# Patient Record
Sex: FEMALE | Race: WHITE | NOT HISPANIC OR LATINO | Employment: FULL TIME | URBAN - METROPOLITAN AREA
[De-identification: names, ages, dates, MRNs, and addresses within clinical notes are randomized per-mention and may not be internally consistent; named-entity substitution may affect disease eponyms.]

---

## 2017-01-26 ENCOUNTER — TELEPHONE (OUTPATIENT)
Dept: OTHER | Facility: CLINIC | Age: 47
End: 2017-01-26

## 2017-01-26 NOTE — TELEPHONE ENCOUNTER
1/26/2017    Call Regarding [employer]boarding [employer]    Attempt 1    Message on voicemail     Comments:         Outreach   Jerri Hassan

## 2017-02-01 NOTE — TELEPHONE ENCOUNTER
2/1/2017    Call Regarding Onboarding [employer]    Attempt 2    Message on voicemail     Comments:       Outreach   KV

## 2017-03-23 ENCOUNTER — OFFICE VISIT (OUTPATIENT)
Dept: DERMATOLOGY | Facility: CLINIC | Age: 47
End: 2017-03-23

## 2017-03-23 DIAGNOSIS — D22.9 MULTIPLE BENIGN NEVI: Primary | ICD-10-CM

## 2017-03-23 DIAGNOSIS — L70.0 ACNE VULGARIS: ICD-10-CM

## 2017-03-23 DIAGNOSIS — B07.9 VIRAL WARTS, UNSPECIFIED TYPE: ICD-10-CM

## 2017-03-23 DIAGNOSIS — D22.9 MULTIPLE ATYPICAL NEVI: ICD-10-CM

## 2017-03-23 DIAGNOSIS — Z85.828 HISTORY OF NONMELANOMA SKIN CANCER: ICD-10-CM

## 2017-03-23 ASSESSMENT — PAIN SCALES - GENERAL: PAINLEVEL: NO PAIN (0)

## 2017-03-23 NOTE — PROGRESS NOTES
"[facility] Dermatology Note      Dermatology Problem List:  1. History of NMSC:              - BCC, R superior forehead s/p MMS 10/2014              - BCC, R Jehovah's witness, s/p MMS in [place] in 2012  2. Multiple (>100) atypical nevi on trunk and extremities. Prior bxs with moderate dysplasia.  3. AKs, improved after field therapy with  5-FU 1-2x/week  4. Flat warts, dorsal PIPs. Cryo 3/14/2016.  5. Acne. Benzaclin.  6. Sebaceous hyperplasia. Tretinoin 0.025% cream.  7. 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 Resolved with [facility] 0.1% cream      Encounter Date: Mar 23, 2017    CC:  Chief Complaint   Patient presents with     Derm Problem     Kay is here today for a full skin check.  Has a few \"spots\" on her back she's concerned about.           History of Present Illness:  Ms. Kay Ceja is a 46 year old female with a history of NMSCx2 who presents as a follow-up for above. The patient was last seen 6 months ago when she had a full skin check and treated with 5-fu field therapy for multiple AK's over her forehead and upper lip in the background sun damage. She additionally had inflamed SK's at that time that were removed via cryotherapy.     . She has been using the 5-FU 1-2x per week over the previous sites where she had AK's, these areas initially developed a sunburn like reaction, but now she does not get the same reaction. She has also tried applying this to some SK-like lesions on her back, and states these have now fallen off with this therapy as well. She thinks the AK's over her upper lip have improved and has not noticed any new gritty lesions or other areas of concern. She has not noticed any recurrence of her BCC. She has warts over her hands, but states these do not bother her much after she pares them down and uses 5-FU on them. She denies any other new lesions, changing lesions, itchy painful or burning spots or any other new concerns today. She has " otherwise been in good health.     Past Medical History:   Patient Active Problem List   Diagnosis     Anxiety     Amenorrhea     Osteopenia     Senile sebaceous gland hyperplasia     Pain in joint involving ankle and foot     Atypical nevi     Basal cell carcinoma of right forehead s/p mms 10-13-14     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    MRI otherwise normal     Insomnia      Osteopenia     T score -1.9 hip and back 1/19/11     Vasomotor rhinitis     uses nasalcort     Past Surgical History:   Procedure Laterality Date     basal cell ca excision       MOHS MICROGRAPHIC PROCEDURE         Social History:  Social History   Substance Use Topics     Smoking status: Former Smoker     Packs/day: 0.20     Years: 5.00     Types: Cigarettes     Quit date: 9/16/1999     Smokeless tobacco: Never Used     Alcohol use No       Family History:  Family History   Problem Relation Age of Onset     C.A.D. Maternal Grandmother      C.A.D. Maternal Grandfather      Breast Cancer Mother      Bipolar Disorder Father      suicide at age 42     Depression Brother      Suicide     CANCER No family hx of      skin cancer     Melanoma No family hx of      Skin Cancer No family hx of        Medications:  Current Outpatient Prescriptions   Medication Sig Dispense Refill     clonazePAM (KLONOPIN) 0.5 MG tablet Take 1 tablet (0.5 mg) by mouth daily 30 tablet 0     Calcium Carbonate (CALCIUM 600) 1500 (600 CA) MG TABS Take 2 tablets by mouth daily 180 tablet 3     fluorouracil (EFUDEX) 5 % cream Apply topically twice a week To face and ears 40 g 3     tretinoin (RETIN-A) 0.025 % cream Apply a pea-size amount to entire face nightly at bedtime. 45 g 11     omega-3 acid ethyl esters (LOVAZA) 1 G capsule Take 3 capsules (3 g) by mouth daily 90 capsule 11     Multiple Vitamins-Minerals  (MULTIVITAMIN & MINERAL PO) Take  by mouth.       triamcinolone (KENALOG) 0.1 % cream Apply to areas of rash on the back and buttocks twice daily for one month (Patient not taking: Reported on 3/23/2017) 45 g 3     omega-3 acid ethyl esters (LOVAZA) 1 G capsule Take 3 capsules (3 g) by mouth daily (Patient not taking: Reported on 3/23/2017) 270 capsule 3     clindamycin-benzoyl peroxide (BENZACLIN WITH PUMP) gel Apply bid as needed (Patient not taking: Reported on 3/23/2017) 50 g 11     triamcinolone (NASACORT AQ) 55 MCG/ACT nasal inhaler Spray 2 sprays in nostril daily (Patient not taking: Reported on 3/23/2017) 1 Inhaler 6     Calcium Carbonate-Vit D-Min (CALCIUM 1200 PO) Take 1 tablet by mouth daily Reported on 3/23/2017       fish oil-omega-3 fatty acids (FISH OIL) 1000 MG capsule Take 1 g by mouth daily Reported on 3/23/2017       No Known Allergies      Review of Systems:  -Constitutional: The patient denies fatigue, fevers, chills, unintended weight loss, and night sweats.  -HEENT: Patient denies nonhealing oral sores.  -Skin: As above in HPI. No additional skin concerns.    Physical exam:  Vitals: There were no vitals taken for this visit.  GEN: This is a well developed, well-nourished female in no acute distress, in a pleasant mood.    SKIN: Full skin, which includes the head/face, both arms, chest, back, abdomen,both legs, genitalia and/or groin buttocks, digits and/or nails, was examined.  - Sites of prior BCC over the forehead and temple show well-healed scars with no evidence of recurrence.  -Multiple waxy tan stuck on appearing papules over back, abdomen and legs  -Over the trunk and extremities, there are >100 light to dark brown macules, most are ovoid w/ symmetric borders and even reticulated pigments networks.   -No other lesions of concern on areas examined.     Impression/Plan:  1. History of nonmelanoma skin cancer, no clincial evidence of recurrence    Sun precaution was advised including the  use of sun screens of SPF 30 or higher, sun protective clothing, and avoidance of tanning beds.    ABCDE's of melanoma discussed, self skin checks were advised, and given the number of moles, discussed utility of taking photos for self-skin checks.    2. Actinic keratosis s/p field therapy w/ 5-FU, no evidence of AK's today over face, shoulders, arms or other sun exposed areas, suggesting effective therapy.     Discussed with patient may discontinue 5-FU, and to not use this on SK's and other lesions.     3. Atypical nevi, >100, on trunk and extremities. No lesions concerning for malignancy today. Recommended recheck in 6 months, and self checks in between to evaluate for any changing lesions.    4. Acne and sebacious hyperplasia, well controlled   - OK to refill BenzaClin and tretinoin if needed, no refills needed today.     5. Verruca vulgaris, dorsal PIP and left proximal palm, Pt states they are stable, and not growing or bothersome today, as she has had these for a while and they have always recurred after cryo, discussed waiting until they become symptomatic to treat again and patient agreed with this plan.     Follow-up in 6 months, earlier for new or changing lesions.     Staff Involved:  Scribed by Rafael Swanson, MS3 for Dr. Francis.

## 2017-03-23 NOTE — LETTER
"3/23/2017     RE: Kay Ceja  [address]  [place] [place] [address]     Dear Colleague,    Thank you for referring your patient, Kay Ceja, to the [facility] DERMATOLOGY at [facility]. Please see a copy of my visit note below.    Kay Ceja was seen and evaluated by myself with the medical student recording the encounter acting as scribe.  I have reviewed the scribed note for completeness and accuracy and made appropriate corrections and amendments.  Sly HOWARD      [facility] Dermatology Note      Dermatology Problem List:  1. History of NMSC:              - BCC, R superior forehead s/p MMS 10/2014              - BCC, R Mandaeism, s/p MMS in [place] in 2012  2. Multiple (>100) atypical nevi on trunk and extremities. Prior bxs with moderate dysplasia.  3. AKs, improved after field therapy with  5-FU 1-2x/week  4. Flat warts, dorsal PIPs. Cryo 3/14/2016.  5. Acne. Benzaclin.  6. Sebaceous hyperplasia. Tretinoin 0.025% cream.  7. 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 Resolved with [facility] 0.1% cream      Encounter Date: Mar 23, 2017    CC:  Chief Complaint   Patient presents with     Derm Problem     Kay is here today for a full skin check.  Has a few \"spots\" on her back she's concerned about.           History of Present Illness:  Ms. Kay Ceja is a 46 year old female with a history of NMSCx2 who presents as a follow-up for above. The patient was last seen 6 months ago when she had a full skin check and treated with 5-fu field therapy for multiple AK's over her forehead and upper lip in the background sun damage. She additionally had inflamed SK's at that time that were removed via cryotherapy.     . She has been using the 5-FU 1-2x per week over the previous sites where she had AK's, these areas initially developed a sunburn like reaction, but now she does not get the same reaction. She has also tried applying this to some " SK-like lesions on her back, and states these have now fallen off with this therapy as well. She thinks the AK's over her upper lip have improved and has not noticed any new gritty lesions or other areas of concern. She has not noticed any recurrence of her BCC. She has warts over her hands, but states these do not bother her much after she pares them down and uses 5-FU on them. She denies any other new lesions, changing lesions, itchy painful or burning spots or any other new concerns today. She has otherwise been in good health.     Past Medical History:   Patient Active Problem List   Diagnosis     Anxiety     Amenorrhea     Osteopenia     Senile sebaceous gland hyperplasia     Pain in joint involving ankle and foot     Atypical nevi     Basal cell carcinoma of right forehead s/p mms 10-13-14     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    MRI otherwise normal     Insomnia      Osteopenia     T score -1.9 hip and back 1/19/11     Vasomotor rhinitis     uses nasalcort     Past Surgical History:   Procedure Laterality Date     basal cell ca excision       MOHS MICROGRAPHIC PROCEDURE         Social History:  Social History   Substance Use Topics     Smoking status: Former Smoker     Packs/day: 0.20     Years: 5.00     Types: Cigarettes     Quit date: 9/16/1999     Smokeless tobacco: Never Used     Alcohol use No       Family History:  Family History   Problem Relation Age of Onset     C.A.D. Maternal Grandmother      C.A.D. Maternal Grandfather      Breast Cancer Mother      Bipolar Disorder Father      suicide at age 42     Depression Brother      Suicide     CANCER No family hx of      skin cancer     Melanoma No family hx of      Skin Cancer No family hx of        Medications:  Current Outpatient Prescriptions   Medication Sig Dispense Refill     clonazePAM  (KLONOPIN) 0.5 MG tablet Take 1 tablet (0.5 mg) by mouth daily 30 tablet 0     Calcium Carbonate (CALCIUM 600) 1500 (600 CA) MG TABS Take 2 tablets by mouth daily 180 tablet 3     fluorouracil (EFUDEX) 5 % cream Apply topically twice a week To face and ears 40 g 3     tretinoin (RETIN-A) 0.025 % cream Apply a pea-size amount to entire face nightly at bedtime. 45 g 11     omega-3 acid ethyl esters (LOVAZA) 1 G capsule Take 3 capsules (3 g) by mouth daily 90 capsule 11     Multiple Vitamins-Minerals (MULTIVITAMIN & MINERAL PO) Take  by mouth.       triamcinolone (KENALOG) 0.1 % cream Apply to areas of rash on the back and buttocks twice daily for one month (Patient not taking: Reported on 3/23/2017) 45 g 3     omega-3 acid ethyl esters (LOVAZA) 1 G capsule Take 3 capsules (3 g) by mouth daily (Patient not taking: Reported on 3/23/2017) 270 capsule 3     clindamycin-benzoyl peroxide (BENZACLIN WITH PUMP) gel Apply bid as needed (Patient not taking: Reported on 3/23/2017) 50 g 11     triamcinolone (NASACORT AQ) 55 MCG/ACT nasal inhaler Spray 2 sprays in nostril daily (Patient not taking: Reported on 3/23/2017) 1 Inhaler 6     Calcium Carbonate-Vit D-Min (CALCIUM 1200 PO) Take 1 tablet by mouth daily Reported on 3/23/2017       fish oil-omega-3 fatty acids (FISH OIL) 1000 MG capsule Take 1 g by mouth daily Reported on 3/23/2017       No Known Allergies      Review of Systems:  -Constitutional: The patient denies fatigue, fevers, chills, unintended weight loss, and night sweats.  -HEENT: Patient denies nonhealing oral sores.  -Skin: As above in HPI. No additional skin concerns.    Physical exam:  Vitals: There were no vitals taken for this visit.  GEN: This is a well developed, well-nourished female in no acute distress, in a pleasant mood.    SKIN: Full skin, which includes the head/face, both arms, chest, back, abdomen,both legs, genitalia and/or groin buttocks, digits and/or nails, was examined.  - Sites of prior BCC  over the forehead and temple show well-healed scars with no evidence of recurrence.  -Multiple waxy tan stuck on appearing papules over back, abdomen and legs  -Over the trunk and extremities, there are >100 light to dark brown macules, most are ovoid w/ symmetric borders and even reticulated pigments networks.   -No other lesions of concern on areas examined.     Impression/Plan:  1. History of nonmelanoma skin cancer, no clincial evidence of recurrence    Sun precaution was advised including the use of sun screens of SPF 30 or higher, sun protective clothing, and avoidance of tanning beds.    ABCDE's of melanoma discussed, self skin checks were advised, and given the number of moles, discussed utility of taking photos for self-skin checks.    2. Actinic keratosis s/p field therapy w/ 5-FU, no evidence of AK's today over face, shoulders, arms or other sun exposed areas, suggesting effective therapy.     Discussed with patient may discontinue 5-FU, and to not use this on SK's and other lesions.     3. Atypical nevi, >100, on trunk and extremities. No lesions concerning for malignancy today. Recommended recheck in 6 months, and self checks in between to evaluate for any changing lesions.    4. Acne and sebacious hyperplasia, well controlled   - OK to refill BenzaClin and tretinoin if needed, no refills needed today.     5. Verruca vulgaris, dorsal PIP and left proximal palm, Pt states they are stable, and not growing or bothersome today, as she has had these for a while and they have always recurred after cryo, discussed waiting until they become symptomatic to treat again and patient agreed with this plan.     Follow-up in 6 months, earlier for new or changing lesions.     Staff Involved:  Scribed by Rafael Swanson, MS3 for Dr. Francis.      Again, thank you for allowing me to participate in the care of your patient.      Sincerely,    MALIKA Francis MD

## 2017-03-23 NOTE — MR AVS SNAPSHOT
After Visit Summary   3/23/2017    Kay Ceja    MRN: [id number]           Patient Information     Date Of Birth          1970        Visit Information        Provider Department      3/23/2017 4:00 PM MALIKA Franics MD [facility] Dermatology        Care Instructions    -Try taking a photo of your back and taking another in 3-4 months to monitor for changes to moles you can't see easily.  - Come back in 6 months, or sooner for any new concerns.    The ABCDEs of Melanoma    Skin cancer can develop anywhere on the skin. Ask someone for help when checking your skin, especially in hard to see places. If you notice a mole different from others, or that changes, enlarges, itches, or bleeds (even if it is small), you should see a dermatologist.                Follow-ups after your visit        Follow-up notes from your care team     Return in about 6 months (around 9/23/2017).      Who to contact     Please call your clinic at [phone] to:    Ask questions about your health    Make or cancel appointments    Discuss your medicines    Learn about your test results    Speak to your doctor   If you have compliments or concerns about an experience at your clinic, or if you wish to file a complaint, please contact [facility] Patient Relations at [phone] or email us at [email or web address]         Additional Information About Your Visit        MyChart Information     [employer] gives you secure access to your electronic health record. If you see a primary care provider, you can also send messages to your care team and make appointments. If you have questions, please call your primary care clinic.  If you do not have a primary care provider, please call [phone] and they will assist you.      [employer] is an electronic gateway that provides easy, online access to your medical records. With [employer], you can request a clinic appointment, read your test results, renew  a prescription or communicate with your care team.     To access your existing account, please contact your [facility] or call [phone] for assistance.        Care EveryWhere ID     This is your Care EveryWhere ID. This could be used by other organizations to access your [place] medical records  [id number]         Blood Pressure from Last 3 Encounters:   08/25/16 (!) 82/56   12/10/15 107/72   09/25/15 100/62    Weight from Last 3 Encounters:   08/25/16 47.5 kg (104 lb 12.8 oz)   12/10/15 46.7 kg (103 lb)   09/25/15 45.8 kg (101 lb)              Today, you had the following     No orders found for display       Primary Care Provider Office Phone # Fax #    Kay Buck [phone]       [facility] [address] [facility] [address]  [facility] [address]        Thank you!     Thank you for choosing [facility] DERMATOLOGY  for your care. Our goal is always to provide you with excellent care. Hearing back from our patients is one way we can continue to improve our services. Please take a few minutes to complete the written survey that you may receive in the mail after your visit with us. Thank you!             Your Updated Medication List - Protect others around you: Learn how to safely use, store and throw away your medicines at www.disposemymeds.org.          This list is accurate as of: 3/23/17  4:32 PM.  Always use your most recent med list.                   Brand Name Dispense Instructions for use    CALCIUM 1200 PO      Take 1 tablet by mouth daily Reported on 3/23/2017       calcium carbonate 1500 (600 CA) MG tablet    CALCIUM 600    180 tablet    Take 2 tablets by mouth daily       clindamycin-benzoyl peroxide gel    BENZACLIN WITH PUMP    50 g    Apply bid as needed       clonazePAM 0.5 MG tablet    klonoPIN    30 tablet    Take 1 tablet (0.5 mg) by mouth daily       fish oil-omega-3 fatty acids 1000 MG capsule      Take 1 g by mouth daily Reported on  3/23/2017       fluorouracil 5 % cream    EFUDEX    40 g    Apply topically twice a week To face and ears       MULTIVITAMIN & MINERAL PO      Take  by mouth.       * omega-3 acid ethyl esters 1 G capsule    Lovaza    90 capsule    Take 3 capsules (3 g) by mouth daily       * omega-3 acid ethyl esters 1 G capsule    Lovaza    270 capsule    Take 3 capsules (3 g) by mouth daily       tretinoin 0.025 % cream    RETIN-A    45 g    Apply a pea-size amount to entire face nightly at bedtime.       triamcinolone 0.1 % cream    KENALOG    45 g    Apply to areas of rash on the back and buttocks twice daily for one month       triamcinolone 55 MCG/ACT nasal inhaler    NASACORT AQ    1 Inhaler    Spray 2 sprays in nostril daily       * Notice:  This list has 2 medication(s) that are the same as other medications prescribed for you. Read the directions carefully, and ask your doctor or other care provider to review them with you.

## 2017-03-23 NOTE — NURSING NOTE
"Dermatology Rooming Note    Kay Ceja's goals for this visit include:   Chief Complaint   Patient presents with     Derm Problem     Kay is here today for a full skin check.  Has a few \"spots\" on her back she's concerned about.         Megan Hicks LPN    "

## 2017-03-23 NOTE — PATIENT INSTRUCTIONS
-Try taking a photo of your back and taking another in 3-4 months to monitor for changes to moles you can't see easily.  - Come back in 6 months, or sooner for any new concerns.    The ABCDEs of Melanoma    Skin cancer can develop anywhere on the skin. Ask someone for help when checking your skin, especially in hard to see places. If you notice a mole different from others, or that changes, enlarges, itches, or bleeds (even if it is small), you should see a dermatologist.

## 2017-03-29 NOTE — PROGRESS NOTES
Kay Ceja was seen and evaluated by myself with the medical student recording the encounter acting as scribe.  I have reviewed the scribed note for completeness and accuracy and made appropriate corrections and amendments.  Sly HOWARD

## 2017-07-03 DIAGNOSIS — L73.8 SENILE SEBACEOUS GLAND HYPERPLASIA: ICD-10-CM

## 2017-07-03 RX ORDER — TRETINOIN 0.25 MG/G
CREAM TOPICAL
Qty: 45 G | Refills: 11 | Status: SHIPPED | OUTPATIENT
Start: 2017-07-03 | End: 2019-09-05

## 2017-07-03 NOTE — TELEPHONE ENCOUNTER
Last seen 03/23/17  No future appt.       tretinoin (RETIN-A) 0.025 % cream      Impression/Plan:  1. History of nonmelanoma skin cancer, no clincial evidence of recurrence    Sun precaution was advised including the use of sun screens of SPF 30 or higher, sun protective clothing, and avoidance of tanning beds.    ABCDE's of melanoma discussed, self skin checks were advised, and given the number of moles, discussed utility of taking photos for self-skin checks.     2. Actinic keratosis s/p field therapy w/ 5-FU, no evidence of AK's today over face, shoulders, arms or other sun exposed areas, suggesting effective therapy.     Discussed with patient may discontinue 5-FU, and to not use this on SK's and other lesions.      3. Atypical nevi, >100, on trunk and extremities. No lesions concerning for malignancy today. Recommended recheck in 6 months, and self checks in between to evaluate for any changing lesions.     4. Acne and sebacious hyperplasia, well controlled                  - OK to refill BenzaClin and tretinoin if needed, no refills needed today.      5. Verruca vulgaris, dorsal PIP and left proximal palm, Pt states they are stable, and not growing or bothersome today, as she has had these for a while and they have always recurred after cryo, discussed waiting until they become symptomatic to treat again and patient agreed with this plan.      Follow-up in 6 months, earlier for new or changing lesions

## 2017-07-03 NOTE — TELEPHONE ENCOUNTER
Received refill request for tretinoin 0.025% cream as [facility]. Dr. Francis's notes reviewed. Refill appropriate, and accepted.    Virginia Cisneros MD  Medicine/Dermatology, PGY-4  [facility]

## 2017-10-20 ENCOUNTER — TELEPHONE (OUTPATIENT)
Dept: DERMATOLOGY | Facility: CLINIC | Age: 47
End: 2017-10-20

## 2017-10-20 NOTE — TELEPHONE ENCOUNTER
[facility] Prior Authorization Team   Phone: [phone]  Fax: [phone]      PA Initiation    Medication: tretinoin (RETIN-A) 0.025 % cream  Insurance Company: [employer] - Phone [phone] Fax [phone]  Pharmacy Filling the Rx: [id number] - [place], [place] - [address]  Filling Pharmacy Phone: [phone]  Filling Pharmacy Fax: [phone]  Start Date: 10/20/2017

## 2017-10-20 NOTE — TELEPHONE ENCOUNTER
Prior Authorization Approval    Authorization Effective Date: 10/18/2017  Authorization Expiration Date: 10/18/2018  Medication: tretinoin (RETIN-A) 0.025 % cream - approved  Approved Dose/Quantity:   Reference #: [id number]   Insurance Company: [employer] - Phone [phone] Fax [phone]  Expected CoPay: $20.00     CoPay Card Available:      Foundation Assistance Needed:    Which Pharmacy is filling the prescription (Not needed for infusion/clinic administered): [id number] [place], [place] - [address] [place] [address]  Pharmacy Notified: Yes  Patient Notified: Yes

## 2017-12-04 DIAGNOSIS — G47.00 INSOMNIA, UNSPECIFIED TYPE: Primary | ICD-10-CM

## 2017-12-04 NOTE — TELEPHONE ENCOUNTER
Pt called to request refill of klonopin. Last in clinic 8/2016 where this medication was ordered by Dr. Buck. Pt takes PRN for insomnia. Informed pt she is due in clinic for annual and would need to be seen for refills. Pt agreeable to schedule but is leaving country on Thursday for one month and won't be able to see Dr. Buck until she returns. Is requesting refill before she departs on her trip. Informed pt I will send to Dr. Buck for approval and get back to her tomorrow.

## 2017-12-05 RX ORDER — CLONAZEPAM 0.5 MG/1
0.5 TABLET ORAL
Qty: 30 TABLET | Refills: 0 | Status: SHIPPED | OUTPATIENT
Start: 2017-12-05 | End: 2017-12-05

## 2017-12-05 NOTE — TELEPHONE ENCOUNTER
Received approval to call in 30 day supply of Klonopin. Called pt and left message on voicemail requesting preferred pharmacy. Awaiting call back

## 2017-12-06 ENCOUNTER — TELEPHONE (OUTPATIENT)
Dept: OBGYN | Facility: CLINIC | Age: 47
End: 2017-12-06

## 2017-12-06 RX ORDER — CLONAZEPAM 0.5 MG/1
0.5 TABLET ORAL
Qty: 30 TABLET | Refills: 0 | Status: SHIPPED | OUTPATIENT
Start: 2017-12-06 | End: 2020-06-30

## 2017-12-06 NOTE — TELEPHONE ENCOUNTER
Pt called [facility]. Called into pharmacy and called to inform patient.

## 2018-01-18 ENCOUNTER — RADIANT APPOINTMENT (OUTPATIENT)
Dept: MAMMOGRAPHY | Facility: CLINIC | Age: 48
End: 2018-01-18
Attending: OBSTETRICS & GYNECOLOGY
Payer: COMMERCIAL

## 2018-01-18 ENCOUNTER — OFFICE VISIT (OUTPATIENT)
Dept: OBGYN | Facility: CLINIC | Age: 48
End: 2018-01-18
Attending: OBSTETRICS & GYNECOLOGY
Payer: COMMERCIAL

## 2018-01-18 VITALS
HEART RATE: 69 BPM | BODY MASS INDEX: 17.93 KG/M2 | DIASTOLIC BLOOD PRESSURE: 67 MMHG | SYSTOLIC BLOOD PRESSURE: 105 MMHG | HEIGHT: 64 IN | WEIGHT: 105 LBS

## 2018-01-18 DIAGNOSIS — Z12.39 BREAST CANCER SCREENING: ICD-10-CM

## 2018-01-18 DIAGNOSIS — Z13.820 SCREENING FOR OSTEOPOROSIS: ICD-10-CM

## 2018-01-18 DIAGNOSIS — Z01.419 ENCOUNTER FOR GYNECOLOGICAL EXAMINATION WITHOUT ABNORMAL FINDING: Primary | ICD-10-CM

## 2018-01-18 DIAGNOSIS — Z12.4 SCREENING FOR MALIGNANT NEOPLASM OF CERVIX: ICD-10-CM

## 2018-01-18 DIAGNOSIS — N91.1 AMENORRHEA, SECONDARY: ICD-10-CM

## 2018-01-18 PROCEDURE — 87624 HPV HI-RISK TYP POOLED RSLT: CPT | Performed by: OBSTETRICS & GYNECOLOGY

## 2018-01-18 PROCEDURE — 77067 SCR MAMMO BI INCL CAD: CPT

## 2018-01-18 PROCEDURE — G0463 HOSPITAL OUTPT CLINIC VISIT: HCPCS | Mod: ZF

## 2018-01-18 PROCEDURE — G0145 SCR C/V CYTO,THINLAYER,RESCR: HCPCS | Performed by: OBSTETRICS & GYNECOLOGY

## 2018-01-18 ASSESSMENT — PAIN SCALES - GENERAL: PAINLEVEL: NO PAIN (0)

## 2018-01-18 ASSESSMENT — ANXIETY QUESTIONNAIRES
2. NOT BEING ABLE TO STOP OR CONTROL WORRYING: NOT AT ALL
GAD7 TOTAL SCORE: 2
6. BECOMING EASILY ANNOYED OR IRRITABLE: NOT AT ALL
3. WORRYING TOO MUCH ABOUT DIFFERENT THINGS: NOT AT ALL
1. FEELING NERVOUS, ANXIOUS, OR ON EDGE: SEVERAL DAYS
7. FEELING AFRAID AS IF SOMETHING AWFUL MIGHT HAPPEN: NOT AT ALL
5. BEING SO RESTLESS THAT IT IS HARD TO SIT STILL: NOT AT ALL

## 2018-01-18 ASSESSMENT — PATIENT HEALTH QUESTIONNAIRE - PHQ9
SUM OF ALL RESPONSES TO PHQ QUESTIONS 1-9: 0
5. POOR APPETITE OR OVEREATING: SEVERAL DAYS

## 2018-01-18 NOTE — PROGRESS NOTES
Progress Note    SUBJECTIVE:  Kay Ceja is an 47 year old  who requests a breast and pelvic exam.        Concerns today include: Wonders about follow-up DEXA scan given early menopause (secondary amenorrhea).    Menstrual History:  Menstrual History 3/16/2012 8/25/2016 8/25/2016   LAST MENSTRUAL PERIOD 12/16/2004 5/24/2016 -   Menarche age - - 13   Period Cycle (Days) - - no period for 12 years then had one 5-   Period Duration (Days) - - 5   Period Pattern - - Irregular   Menstrual Flow - - Light   Dysmenorrhea - - Mild   PMS Symptoms - - Cramping   Reviewed Today - - Yes       Last    Lab Results   Component Value Date    PAP NIL 03/07/2013     History of abnormal Pap smear: NO - age 30-65 PAP every 5 years with negative HPV co-testing recommended    HPV negative 3/7/13    Mammogram current: has scheduled today    HISTORY:  Prescription Medications as of 1/18/2018             tretinoin (RETIN-A) 0.025 % cream Apply a pea-size amount to entire face nightly at bedtime.    omega-3 acid ethyl esters (LOVAZA) 1 G capsule Take 3 capsules (3 g) by mouth daily    Multiple Vitamins-Minerals (MULTIVITAMIN & MINERAL PO) Take  by mouth.    clonazePAM (KLONOPIN) 0.5 MG tablet Take 1 tablet (0.5 mg) by mouth nightly as needed for other (anxiety and sleep)    Calcium Carbonate-Vit D-Min (CALCIUM 1200 PO) Take 1 tablet by mouth daily Reported on 3/23/2017    fish oil-omega-3 fatty acids (FISH OIL) 1000 MG capsule Take 1 g by mouth daily Reported on 3/23/2017        No Known Allergies  Immunization History   Administered Date(s) Administered     Influenza (IIV3) PF 11/20/2012, 10/27/2013       Obstetric History     No data available     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    MRI otherwise normal     Insomnia      Osteopenia     T score -1.9 hip and back 1/19/11      "Vasomotor rhinitis     uses nasalcort     Past Surgical History:   Procedure Laterality Date     basal cell ca excision       MOHS MICROGRAPHIC PROCEDURE       Family History   Problem Relation Age of Onset     EVELIN Maternal Grandmother      EVELIN Maternal Grandfather      Breast Cancer Mother      Bipolar Disorder Father      suicide at age 42     Depression Brother      Suicide     CANCER No family hx of      skin cancer     Melanoma No family hx of      Skin Cancer No family hx of      Social History     Social History     Marital status:      Spouse name: N/A     Number of children: N/A     Years of education: N/A     Social History Main Topics     Smoking status: Former Smoker     Packs/day: 0.20     Years: 5.00     Types: Cigarettes     Quit date: 9/16/1999     Smokeless tobacco: Never Used     Alcohol use No     Drug use: No     Sexual activity: Not Currently     Partners: Male     Other Topics Concern     Exercise Yes     runner, yoga, strength trains     Social History Narrative    How much exercise per week? daily    How much calcium per day? supplements       How much caffeine per day? 2 tea    How much vitamin D per day? In supplements    Do you/your family wear seatbelts?  Yes    Do you/your family use safety helmets? Yes    Do you/your family use sunscreen? Yes    Do you/your family keep firearms in the home? No    Do you/your family have a smoke detector(s)? Yes        Do you feel safe in your home? Yes    Has anyone ever touched you in an unwanted manner? No     Explain reviewed [facility] 8-           ROS    EXAM:  Blood pressure 105/67, pulse 69, height 1.626 m (5' 4\"), weight 47.6 kg (105 lb), not currently breastfeeding. Body mass index is 18.02 kg/(m^2).  General appearance: Pleasant female in no acute distress.     BREAST EXAM:  Breast: Without visible skin changes. No dimpling or lesions seen.   Breasts supple, non-tender with palpation, no dominant mass, nodularity, or nipple " discharge noted bilaterally. Axillary nodes negative.      PELVIC EXAM:  EG/BUS: Normal genital architecture without lesions, erythema or abnormal secretions Bartholin's, Urethra, [place]'s normal   Urethral meatus: normal    Urethra: no masses, tenderness, or scarring    Bladder: no masses or tenderness    Vagina: moist, pink, rugae with creamy, white and odorless  secretions  Cervix: Nulliparous, and no lesions  Uterus: anteverted,  and small, smooth, firm, mobile w/o pain  Adnexa: Within normal limits and No masses, nodularity, tenderness  Rectum:anus normal       ASSESSMENT:  Encounter Diagnoses   Name Primary?     Encounter for gynecological examination without abnormal finding Yes     Screening for malignant neoplasm of cervix      Breast cancer screening      Amenorrhea, secondary      Screening for osteoporosis       47 year old Female Pelvic and Breast Exam    PLAN:   Orders Placed This Encounter   Procedures     Pelvic and Breast Exam Procedure []     Pap Smear Exam []     Mammogram Screening Bilateral Digital [VMM223]     Dexa hip/pelvis/spine     Pap imaged thin layer screen with HPV - recommended age 30 - 65 years (select HPV order below)     HPV High Risk Types DNA Cervical       Return in one year/PRN for preventive care or problems/concerns.     Verbalized understanding and agreement with visit plan.    Kay Buck MD

## 2018-01-18 NOTE — MR AVS SNAPSHOT
After Visit Summary   1/18/2018    Kay Ceja    MRN: [id number]           Patient Information     Date Of Birth          1970        Visit Information        Provider Department      1/18/2018 9:00 AM Kay Buck MD [facility]        Today's Diagnoses     Encounter for gynecological examination without abnormal finding    -  1    Screening for malignant neoplasm of cervix        Breast cancer screening        Amenorrhea, secondary        Screening for osteoporosis           Follow-ups after your visit        Follow-up notes from your care team     Return in 1 year (on 1/18/2019) for Preventative Health Visit.      Who to contact     Please call your clinic at [phone] to:    Ask questions about your health    Make or cancel appointments    Discuss your medicines    Learn about your test results    Speak to your doctor   If you have compliments or concerns about an experience at your clinic, or if you wish to file a complaint, please contact [facility] Patient Relations at [phone] or email us at [email or web address]         Additional Information About Your Visit        MyChart Information     [employer] gives you secure access to your electronic health record. If you see a primary care provider, you can also send messages to your care team and make appointments. If you have questions, please call your primary care clinic.  If you do not have a primary care provider, please call [phone] and they will assist you.      The Vetted Net is an electronic gateway that provides easy, online access to your medical records. With The Vetted Net, you can request a clinic appointment, read your test results, renew a prescription or communicate with your care team.     To access your existing account, please contact your [facility] or call [phone] for assistance.        Care EveryWhere ID     This is your Care  "EveryWhere ID. This could be used by other organizations to access your [facility] medical records  [id number]        Your Vitals Were     Pulse Height Breastfeeding? BMI (Body Mass Index)          69 1.626 m (5' 4\") No 18.02 kg/m2         Blood Pressure from Last 3 Encounters:   01/18/18 105/67   08/25/16 (!) 82/56   12/10/15 107/72    Weight from Last 3 Encounters:   01/18/18 47.6 kg (105 lb)   08/25/16 47.5 kg (104 lb 12.8 oz)   12/10/15 46.7 kg (103 lb)              We Performed the Following     HPV High Risk Types DNA Cervical     Pap imaged thin layer screen with HPV - recommended age 30 - 65 years (select HPV order below)     Pap Smear Exam []     Pelvic and Breast Exam Procedure []          Today's Medication Changes          These changes are accurate as of: 1/18/18 11:59 PM.  If you have any questions, ask your nurse or doctor.               These medicines have changed or have updated prescriptions.        Dose/Directions    omega-3 acid ethyl esters 1 G capsule   Commonly known as:  Lovaza   This may have changed:  Another medication with the same name was removed. Continue taking this medication, and follow the directions you see here.   Used for:  Back pain   Changed by:  Kay Buck MD        Dose:  3 g   Take 3 capsules (3 g) by mouth daily   Quantity:  90 capsule   Refills:  11         Stop taking these medicines if you haven't already. Please contact your care team if you have questions.     calcium carbonate 1500 (600 CA) MG tablet   Commonly known as:  CALCIUM 600   Stopped by:  Kay Buck MD           clindamycin-benzoyl peroxide gel   Commonly known as:  BENZACLIN WITH PUMP   Stopped by:  Kay Buck MD           fluorouracil 5 % cream   Commonly known as:  EFUDEX   Stopped by:  Kay Buck MD           triamcinolone 0.1 % cream   Commonly known as:  KENALOG   Stopped by:  Kay Buck MD           triamcinolone 55 MCG/ACT nasal inhaler   Commonly known " as:  NASACORT AQ   Stopped by:  Kay Buck MD                    Primary Care Provider Office Phone # Fax #    Kay Buck [phone]       [address] [address]        Equal Access to Services     [facility]: Hadii aad ku hadchristiano Soomaali, waaxda luqadaha, qaybta kaalmada adeegyada, waxalba roycein kavinn adeisacc joseph lajoelalfredo . So [facility] [phone].    ATENCIÓN: Si habla español, tiene a mahoney disposición servicios gratuitos de asistencia lingüística. Llame al [phone].    We comply with applicable federal civil rights laws and [place] laws. We do not discriminate on the basis of race, color, national origin, age, disability, sex, sexual orientation, or gender identity.            Thank you!     Thank you for choosing [facility]  for your care. Our goal is always to provide you with excellent care. Hearing back from our patients is one way we can continue to improve our services. Please take a few minutes to complete the written survey that you may receive in the mail after your visit with us. Thank you!             Your Updated Medication List - Protect others around you: Learn how to safely use, store and throw away your medicines at www.disposemymeds.org.          This list is accurate as of: 1/18/18 11:59 PM.  Always use your most recent med list.                   Brand Name Dispense Instructions for use Diagnosis    CALCIUM 1200 PO      Take 1 tablet by mouth daily Reported on 3/23/2017        clonazePAM 0.5 MG tablet    klonoPIN    30 tablet    Take 1 tablet (0.5 mg) by mouth nightly as needed for other (anxiety and sleep)    Insomnia, unspecified type       fish oil-omega-3 fatty acids 1000 MG capsule      Take 1 g by mouth daily Reported on 3/23/2017        MULTIVITAMIN & MINERAL PO      Take  by mouth.        omega-3 acid ethyl esters 1 G capsule    Lovaza    90 capsule    Take 3 capsules (3 g) by mouth daily    Back pain        tretinoin 0.025 % cream    RETIN-A    45 g    Apply a pea-size amount to entire face nightly at bedtime.    Senile sebaceous gland hyperplasia

## 2018-01-18 NOTE — LETTER
1/18/2018       RE: Kay Ceja  [address]  [facility] [address]     Dear Colleague,    Thank you for referring your patient, Kay Ceja, to the [facility] at [facility]. Please see a copy of my visit note below.      Progress Note    SUBJECTIVE:  Kay Ceja is an 47 year old  who requests a breast and pelvic exam.        Concerns today include: Wonders about follow-up DEXA scan given early menopause (secondary amenorrhea).    Menstrual History:  Menstrual History 3/16/2012 8/25/2016 8/25/2016   LAST MENSTRUAL PERIOD 12/16/2004 5/24/2016 -   Menarche age - - 13   Period Cycle (Days) - - no period for 12 years then had one 5-   Period Duration (Days) - - 5   Period Pattern - - Irregular   Menstrual Flow - - Light   Dysmenorrhea - - Mild   PMS Symptoms - - Cramping   Reviewed Today - - Yes       Last    Lab Results   Component Value Date    PAP NIL 03/07/2013     History of abnormal Pap smear: NO - age 30-65 PAP every 5 years with negative HPV co-testing recommended    HPV negative 3/7/13    Mammogram current: has scheduled today    HISTORY:  Prescription Medications as of 1/18/2018             tretinoin (RETIN-A) 0.025 % cream Apply a pea-size amount to entire face nightly at bedtime.    omega-3 acid ethyl esters (LOVAZA) 1 G capsule Take 3 capsules (3 g) by mouth daily    Multiple Vitamins-Minerals (MULTIVITAMIN & MINERAL PO) Take  by mouth.    clonazePAM (KLONOPIN) 0.5 MG tablet Take 1 tablet (0.5 mg) by mouth nightly as needed for other (anxiety and sleep)    Calcium Carbonate-Vit D-Min (CALCIUM 1200 PO) Take 1 tablet by mouth daily Reported on 3/23/2017    fish oil-omega-3 fatty acids (FISH OIL) 1000 MG capsule Take 1 g by mouth daily Reported on 3/23/2017        No Known Allergies  Immunization History   Administered Date(s) Administered     Influenza (IIV3) PF 11/20/2012, 10/27/2013       Obstetric History     No data available     Past  "Medical History:   Diagnosis Date     Amenorrhea     hypothalamic     Anxiety      Back pain     normal MRI 9/4/09     Basal cell carcinoma      Chondromalacia of right patella 2/12/08    MRI otherwise normal     Insomnia      Osteopenia     T score -1.9 hip and back 1/19/11     Vasomotor rhinitis     uses nasalcort     Past Surgical History:   Procedure Laterality Date     basal cell ca excision       MOHS MICROGRAPHIC PROCEDURE       Family History   Problem Relation Age of Onset     C.A.DChirag Maternal Grandmother      CChiragAANANTH Maternal Grandfather      Breast Cancer Mother      Bipolar Disorder Father      suicide at age 42     Depression Brother      Suicide     CANCER No family hx of      skin cancer     Melanoma No family hx of      Skin Cancer No family hx of      Social History     Social History     Marital status:      Spouse name: N/A     Number of children: N/A     Years of education: N/A     Social History Main Topics     Smoking status: Former Smoker     Packs/day: 0.20     Years: 5.00     Types: Cigarettes     Quit date: 9/16/1999     Smokeless tobacco: Never Used     Alcohol use No     Drug use: No     Sexual activity: Not Currently     Partners: Male     Other Topics Concern     Exercise Yes     runner, yoga, strength trains     Social History Narrative    How much exercise per week? daily    How much calcium per day? supplements       How much caffeine per day? 2 tea    How much vitamin D per day? In supplements    Do you/your family wear seatbelts?  Yes    Do you/your family use safety helmets? Yes    Do you/your family use sunscreen? Yes    Do you/your family keep firearms in the home? No    Do you/your family have a smoke detector(s)? Yes        Do you feel safe in your home? Yes    Has anyone ever touched you in an unwanted manner? No     Explain reviewed [facility] 8-           ROS    EXAM:  Blood pressure 105/67, pulse 69, height 1.626 m (5' 4\"), weight 47.6 kg (105 lb), not currently " breastfeeding. Body mass index is 18.02 kg/(m^2).  General appearance: Pleasant female in no acute distress.     BREAST EXAM:  Breast: Without visible skin changes. No dimpling or lesions seen.   Breasts supple, non-tender with palpation, no dominant mass, nodularity, or nipple discharge noted bilaterally. Axillary nodes negative.      PELVIC EXAM:  EG/BUS: Normal genital architecture without lesions, erythema or abnormal secretions Bartholin's, Urethra, [place]'s normal   Urethral meatus: normal    Urethra: no masses, tenderness, or scarring    Bladder: no masses or tenderness    Vagina: moist, pink, rugae with creamy, white and odorless  secretions  Cervix: Nulliparous, and no lesions  Uterus: anteverted,  and small, smooth, firm, mobile w/o pain  Adnexa: Within normal limits and No masses, nodularity, tenderness  Rectum:anus normal       ASSESSMENT:  Encounter Diagnoses   Name Primary?     Encounter for gynecological examination without abnormal finding Yes     Screening for malignant neoplasm of cervix      Breast cancer screening      Amenorrhea, secondary      Screening for osteoporosis       47 year old Female Pelvic and Breast Exam    PLAN:   Orders Placed This Encounter   Procedures     Pelvic and Breast Exam Procedure []     Pap Smear Exam []     Mammogram Screening Bilateral Digital [PGF511]     Dexa hip/pelvis/spine     Pap imaged thin layer screen with HPV - recommended age 30 - 65 years (select HPV order below)     HPV High Risk Types DNA Cervical       Return in one year/PRN for preventive care or problems/concerns.     Verbalized understanding and agreement with visit plan.      Again, thank you for allowing me to participate in the care of your patient.      Sincerely,    Kay Buck MD

## 2018-01-19 ASSESSMENT — ANXIETY QUESTIONNAIRES: GAD7 TOTAL SCORE: 2

## 2018-01-22 LAB
COPATH REPORT: NORMAL
PAP: NORMAL

## 2018-01-24 LAB
FINAL DIAGNOSIS: NORMAL
HPV HR 12 DNA CVX QL NAA+PROBE: NEGATIVE
HPV16 DNA SPEC QL NAA+PROBE: NEGATIVE
HPV18 DNA SPEC QL NAA+PROBE: NEGATIVE
SPECIMEN DESCRIPTION: NORMAL
SPECIMEN SOURCE CVX/VAG CYTO: NORMAL

## 2018-07-12 ENCOUNTER — OFFICE VISIT (OUTPATIENT)
Dept: OBGYN | Facility: CLINIC | Age: 48
End: 2018-07-12
Attending: OBSTETRICS & GYNECOLOGY
Payer: COMMERCIAL

## 2018-07-12 VITALS
WEIGHT: 103.3 LBS | SYSTOLIC BLOOD PRESSURE: 94 MMHG | DIASTOLIC BLOOD PRESSURE: 61 MMHG | HEIGHT: 64 IN | BODY MASS INDEX: 17.64 KG/M2

## 2018-07-12 DIAGNOSIS — B96.89 BACTERIAL VAGINOSIS: Primary | ICD-10-CM

## 2018-07-12 DIAGNOSIS — N76.0 BACTERIAL VAGINOSIS: Primary | ICD-10-CM

## 2018-07-12 PROCEDURE — G0463 HOSPITAL OUTPT CLINIC VISIT: HCPCS | Mod: ZF

## 2018-07-12 RX ORDER — METRONIDAZOLE 500 MG/1
500 TABLET ORAL 2 TIMES DAILY
Qty: 14 TABLET | Refills: 0 | Status: SHIPPED | OUTPATIENT
Start: 2018-07-12 | End: 2018-07-12

## 2018-07-12 RX ORDER — METRONIDAZOLE 7.5 MG/G
1 GEL VAGINAL AT BEDTIME
Qty: 70 G | Refills: 0 | Status: SHIPPED | OUTPATIENT
Start: 2018-07-12 | End: 2018-07-17

## 2018-07-12 ASSESSMENT — PAIN SCALES - GENERAL: PAINLEVEL: NO PAIN (0)

## 2018-07-12 NOTE — MR AVS SNAPSHOT
After Visit Summary   7/12/2018    Kay Ceja    MRN: [id number]           Patient Information     Date Of Birth          1970        Visit Information        Provider Department      7/12/2018 3:00 PM Bev Jones APRN CNP [facility]        Today's Diagnoses     Visit for preventive health examination    -  1    Bacterial vaginosis          Care Instructions      PREVENTIVE HEALTH RECOMMENDATIONS:   Most women need a yearly breast and pelvic exam.    A PAP screen, a test done DURING a pelvic exam, is NO longer recommended yearly.    March 2013, screening guidelines recommended by ACOG for PAP screen are:    1) First pap at age 21.    2) Pap every 3 years until age 30.    3) After age 30, pap every 3 years or Pap with HR HPV screen every 5 years until age 65.  4) Women do NOT need a vaginal Pap screen after a hysterectomy (surgical removal of the uterus) when they have not had cancer.    Exceptions:  1) Yearly pap if HIV+ or immunosuppressed secondary to organ transplant  2) BETSY II-III continue routine screening for 20 years.    I encourage you continue looking for opportunities to choose a healthy lifestyle:       * Choose to eat a heart healthy diet. Check out the FOOD PLATE guidelines at: http://www.choosemyplate.gov/ for helpful hints on weight and cholesterol management.  Balance your caloric intake with exercise to maintain a BMI in the 22 to 26 range. For bone health: Eat calcium-rich foods like yogurt, broccoli or take chewable calcium pills (500 to 600 mg) twice a day with food.       * Exercise for at least an average of 30 minutes a day, 5 days of the week. This will help you control your weight, release stress, and help prevent disease.      * Take a Vitamin D3 supplement daily fall through spring and during summer unless you ptll01-41' full body sun exposure to skin without sunscreen.      * DO wear sunscreen to prevent skin cancer after the first 15-30  minutes.      * Identify stressors in your life, find ways to release the stress, and, make time for yourself. PLEASE ask for help if mood changes last longer than two weeks.     * Limit alcohol to one drink per day.  No smoking.  Avoid second hand smoke. If you smoke, ask for help to stop.       *  If you are in a sexual relationship, talk with your partner about possible infection risks and take action to protect yourself from exposure to a sexual infection.    Please request an infection screen for STIs (sexually transmitted infections) if you are less than age 26 OR believe that you may be at risk.     Get a flu shot each year. Get a tetanus shot every 10 years. EVERYONE needs a pertussis (Whooping cough) booster.    See your dentist twice a year for an exam and preventive care cleaning.     Consider the following screen tests:    1) cholesterol test every 5 years.     2) yearly mammogram after age 40 unless you have identified risks.    3) colonoscopy every 10 years after age 50 unless you have identified risks.    4) diabetes blood test screening if you are at risk for diabetes.      Additional information that you may also find helpful:  The Internet now gives us access to LOTS of information -- some of it helpful, research documented and also plenty of harmful, anecdotal information that may not pertain to your situtaion. Consider visiting the following websites for accurate health information:    www.vitamindcouncil.org/ : Info and ongoing research re Vitamin D    www.fairview.org : Up to date and easily searchable information on multiple topics.    www.medlineplus.gov : medication info, interactive tutorials, watch real surgeries online    www.cdc.gov : public health info, travel advisories, epidemics (H1N1)    www.miguel/std.org: current research re diagnosis, treatment and prevention of sexually contacted infections.    www.health.[facility].[place] : [place] dept of heatlh, public health issues in [place],  "N1N1    www.familydoctor.org : good info from the Academy of Family Physicians                Follow-ups after your visit        Follow-up notes from your care team     Return in 1 year (on 7/12/2019) for Preventative Health Visit.      Who to contact     Please call your clinic at [phone] to:    Ask questions about your health    Make or cancel appointments    Discuss your medicines    Learn about your test results    Speak to your doctor            Additional Information About Your Visit        Care-n-ShareharPrompt.ly Information     [employer] gives you secure access to your electronic health record. If you see a primary care provider, you can also send messages to your care team and make appointments. If you have questions, please call your primary care clinic.  If you do not have a primary care provider, please call [phone] and they will assist you.      [employer] is an electronic gateway that provides easy, online access to your medical records. With [employer], you can request a clinic appointment, read your test results, renew a prescription or communicate with your care team.     To access your existing account, please contact your [facility] or call [phone] for assistance.        Care EveryWhere ID     This is your Care EveryWhere ID. This could be used by other organizations to access your [facility] medical records  [id number]        Your Vitals Were     Height BMI (Body Mass Index)                1.626 m (5' 4\") 17.73 kg/m2           Blood Pressure from Last 3 Encounters:   07/12/18 94/61   01/18/18 105/67   08/25/16 (!) 82/56    Weight from Last 3 Encounters:   07/12/18 46.9 kg (103 lb 4.8 oz)   01/18/18 47.6 kg (105 lb)   08/25/16 47.5 kg (104 lb 12.8 oz)              Today, you had the following     No orders found for display         Today's Medication Changes          These changes are accurate as of 7/12/18  3:37 PM.  If you have any questions, ask your nurse or doctor.       "         Start taking these medicines.        Dose/Directions    metroNIDAZOLE 500 MG tablet   Commonly known as:  FLAGYL   Used for:  Bacterial vaginosis   Started by:  Bev Jones APRN CNP        Dose:  500 mg   Take 1 tablet (500 mg) by mouth 2 times daily   Quantity:  14 tablet   Refills:  0            Where to get your medicines      These medications were sent to [facility] [id number] [facility] [place] [facility] [address]  [address] [facility] [address]     Phone:  [phone]     metroNIDAZOLE 500 MG tablet                Primary Care Provider Office Phone # Fax #    Kay Buck [phone]       [address]  [facility] [address]        Equal Access to Services     [facility] Center[facility] : Hadii ayala Zuniga, wadeon pascual, qalinda kaalmada elsa, liz duque . So [facility] [phone].    ATENCIÓN: Si habla español, tiene a mahoney disposición servicios gratuitos de asistencia lingüística. Llame al [phone].    We comply with applicable federal civil rights laws and [place] laws. We do not discriminate on the basis of race, color, national origin, age, disability, sex, sexual orientation, or gender identity.            Thank you!     Thank you for choosing [facility]  for your care. Our goal is always to provide you with excellent care. Hearing back from our patients is one way we can continue to improve our services. Please take a few minutes to complete the written survey that you may receive in the mail after your visit with us. Thank you!             Your Updated Medication List - Protect others around you: Learn how to safely use, store and throw away your medicines at www.disposemymeds.org.          This list is accurate as of 7/12/18  3:37 PM.  Always use your most recent med list.                   Brand Name Dispense Instructions for use Diagnosis    CALCIUM 1200 PO      Take 1 tablet by mouth daily  Reported on 3/23/2017        clonazePAM 0.5 MG tablet    klonoPIN    30 tablet    Take 1 tablet (0.5 mg) by mouth nightly as needed for other (anxiety and sleep)    Insomnia, unspecified type       fish oil-omega-3 fatty acids 1000 MG capsule      Take 1 g by mouth daily Reported on 3/23/2017        metroNIDAZOLE 500 MG tablet    FLAGYL    14 tablet    Take 1 tablet (500 mg) by mouth 2 times daily    Bacterial vaginosis       MULTIVITAMIN & MINERAL PO      Take  by mouth.        omega-3 acid ethyl esters 1 g capsule    Lovaza    90 capsule    Take 3 capsules (3 g) by mouth daily    Back pain       tretinoin 0.025 % cream    RETIN-A    45 g    Apply a pea-size amount to entire face nightly at bedtime.    Senile sebaceous gland hyperplasia

## 2018-07-12 NOTE — LETTER
7/12/2018       RE: Kay Ceja  [address]  [facility] [address]     Dear Colleague,    Thank you for referring your patient, Kay Ceja, to the [facility] at [facility]. Please see a copy of my visit note below.          Progress Note    SUBJECTIVE:  REBECA Ceja is an 47 year old, No obstetric history on file., who requests an evaluation of vaginal discharge and odor for one week.    States she is not sexually active. Noticed symptoms after a sitting meditation retreat  Menses stopped in 30s  Lat well woman annual 1/2018 with Dr Buck      Menstrual History:  Menstrual History 3/16/2012 8/25/2016 8/25/2016   LAST MENSTRUAL PERIOD 12/16/2004 5/24/2016 -   Menarche age - - 13   Period Cycle (Days) - - no period for 12 years then had one 5-   Period Duration (Days) - - 5   Period Pattern - - Irregular   Menstrual Flow - - Light   Dysmenorrhea - - Mild   PMS Symptoms - - Cramping   Reviewed Today - - Yes       Last    Lab Results   Component Value Date    PAP NIL 01/18/2018         Last   Lab Results   Component Value Date    HPV16 Negative 01/18/2018     Last   Lab Results   Component Value Date    HPV18 Negative 01/18/2018     Last   Lab Results   Component Value Date    HRHPV Negative 01/18/2018         HISTORY:    Current Outpatient Prescriptions on File Prior to Visit:  Calcium Carbonate-Vit D-Min (CALCIUM 1200 PO) Take 1 tablet by mouth daily Reported on 3/23/2017   clonazePAM (KLONOPIN) 0.5 MG tablet Take 1 tablet (0.5 mg) by mouth nightly as needed for other (anxiety and sleep)   fish oil-omega-3 fatty acids (FISH OIL) 1000 MG capsule Take 1 g by mouth daily Reported on 3/23/2017   Multiple Vitamins-Minerals (MULTIVITAMIN & MINERAL PO) Take  by mouth.   omega-3 acid ethyl esters (LOVAZA) 1 G capsule Take 3 capsules (3 g) by mouth daily   tretinoin (RETIN-A) 0.025 % cream Apply a pea-size amount to entire face nightly at bedtime.     No  current facility-administered medications on file prior to visit.   No Known Allergies  Immunization History   Administered Date(s) Administered     Influenza (IIV3) PF 11/20/2012, 10/27/2013       Obstetric History     No data available     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    MRI otherwise normal     Insomnia      Osteopenia     T score -1.9 hip and back 1/19/11     Vasomotor rhinitis     uses nasalcort     Past Surgical History:   Procedure Laterality Date     basal cell ca excision       MOHS MICROGRAPHIC PROCEDURE       Family History   Problem Relation Age of Onset     C.A.D. Maternal Grandmother      CChiragA.FRANKLYN Maternal Grandfather      Breast Cancer Mother      Bipolar Disorder Father      suicide at age 42     Depression Brother      Suicide     Cancer No family hx of      skin cancer     Melanoma No family hx of      Skin Cancer No family hx of      Social History     Social History     Marital status:      Spouse name: N/A     Number of children: N/A     Years of education: N/A     Social History Main Topics     Smoking status: Former Smoker     Packs/day: 0.20     Years: 5.00     Types: Cigarettes     Quit date: 9/16/1999     Smokeless tobacco: Never Used     Alcohol use No     Drug use: No     Sexual activity: Not Currently     Partners: Male     Other Topics Concern     Exercise Yes     runner, yoga, strength trains     Social History Narrative    How much exercise per week? daily    How much calcium per day? supplements       How much caffeine per day? 2 tea    How much vitamin D per day? In supplements    Do you/your family wear seatbelts?  Yes    Do you/your family use safety helmets? Yes    Do you/your family use sunscreen? Yes    Do you/your family keep firearms in the home? No    Do you/your family have a smoke detector(s)? Yes        Do  "you feel safe in your home? Yes    Has anyone ever touched you in an unwanted manner? No     Explain reviewed [facility] 8-           ROS  [unfilled]  PHQ-9 SCORE 4/8/2014 1/18/2018   Total Score 0 -   Total Score - 0     SANTA-7 SCORE 8/24/2016 1/18/2018   Total Score 14 (moderate anxiety) -   Total Score - 2         EXAM:  Blood pressure 94/61, height 1.626 m (5' 4\"), weight 46.9 kg (103 lb 4.8 oz), not currently breastfeeding. Body mass index is 17.73 kg/(m^2).  General - pleasant female in no acute distress.  Skin - no suspicious lesions or rashes  EENT-  PERRLA, euthyroid with out palpable nodules  Neck - supple without lymphadenopathy.  Musculoskeletal - no gross deformities.  Neurological - normal strength, sensation, and mental status.    Pelvic Exam:  EG/BUS: Normal genital architecture without lesions, erythema or abnormal secretions. Normal genital architecture without lesions, erythema or abnormal secretions Bartholin's, Urethra, [place]'s normal   Urethral meatus: normal   Urethra: no masses, tenderness, or scarring   Bladder: no masses or tenderness   Vagina: moist, pink, rugae with white, foul smelling and milky  secretions  Uterus: anteverted,   Adnexa: Within normal limits and No masses, nodularity, tenderness  Rectum:anus normal   wet prep: positive whiff, positive clue PH 5,no yeast      ASSESSMENT:  Encounter Diagnosis   Name Primary?     Bacterial vaginosis Yes        PLAN:     Metrogel vaginal cream as prescribed  Reviewed healthy vulvar hygiene, avoidance of dyes, scents,tight clothing    Bev LA            "

## 2018-07-12 NOTE — PROGRESS NOTES
Progress Note    SUBJECTIVE:  REBECA Ceja is an 47 year old, No obstetric history on file., who requests an evaluation of vaginal discharge and odor for one week.    States she is not sexually active. Noticed symptoms after a sitting meditation retreat  Menses stopped in 30s  Lat well woman annual 1/2018 with Dr Buck      Menstrual History:  Menstrual History 3/16/2012 8/25/2016 8/25/2016   LAST MENSTRUAL PERIOD 12/16/2004 5/24/2016 -   Menarche age - - 13   Period Cycle (Days) - - no period for 12 years then had one 5-   Period Duration (Days) - - 5   Period Pattern - - Irregular   Menstrual Flow - - Light   Dysmenorrhea - - Mild   PMS Symptoms - - Cramping   Reviewed Today - - Yes       Last    Lab Results   Component Value Date    PAP NIL 01/18/2018         Last   Lab Results   Component Value Date    HPV16 Negative 01/18/2018     Last   Lab Results   Component Value Date    HPV18 Negative 01/18/2018     Last   Lab Results   Component Value Date    HRHPV Negative 01/18/2018         HISTORY:    Current Outpatient Prescriptions on File Prior to Visit:  Calcium Carbonate-Vit D-Min (CALCIUM 1200 PO) Take 1 tablet by mouth daily Reported on 3/23/2017   clonazePAM (KLONOPIN) 0.5 MG tablet Take 1 tablet (0.5 mg) by mouth nightly as needed for other (anxiety and sleep)   fish oil-omega-3 fatty acids (FISH OIL) 1000 MG capsule Take 1 g by mouth daily Reported on 3/23/2017   Multiple Vitamins-Minerals (MULTIVITAMIN & MINERAL PO) Take  by mouth.   omega-3 acid ethyl esters (LOVAZA) 1 G capsule Take 3 capsules (3 g) by mouth daily   tretinoin (RETIN-A) 0.025 % cream Apply a pea-size amount to entire face nightly at bedtime.     No current facility-administered medications on file prior to visit.   No Known Allergies  Immunization History   Administered Date(s) Administered     Influenza (IIV3) PF 11/20/2012, 10/27/2013       Obstetric History     No data available     Past Medical History:   Diagnosis Date      Amenorrhea     hypothalamic     Anxiety      Back pain     normal MRI 9/4/09     Basal cell carcinoma      Chondromalacia of right patella 2/12/08    MRI otherwise normal     Insomnia      Osteopenia     T score -1.9 hip and back 1/19/11     Vasomotor rhinitis     uses nasalcort     Past Surgical History:   Procedure Laterality Date     basal cell ca excision       MOHS MICROGRAPHIC PROCEDURE       Family History   Problem Relation Age of Onset     C.A.DChirag Maternal Grandmother      CChiragA.DIEGO. Maternal Grandfather      Breast Cancer Mother      Bipolar Disorder Father      suicide at age 42     Depression Brother      Suicide     Cancer No family hx of      skin cancer     Melanoma No family hx of      Skin Cancer No family hx of      Social History     Social History     Marital status:      Spouse name: N/A     Number of children: N/A     Years of education: N/A     Social History Main Topics     Smoking status: Former Smoker     Packs/day: 0.20     Years: 5.00     Types: Cigarettes     Quit date: 9/16/1999     Smokeless tobacco: Never Used     Alcohol use No     Drug use: No     Sexual activity: Not Currently     Partners: Male     Other Topics Concern     Exercise Yes     runner, yoga, strength trains     Social History Narrative    How much exercise per week? daily    How much calcium per day? supplements       How much caffeine per day? 2 tea    How much vitamin D per day? In supplements    Do you/your family wear seatbelts?  Yes    Do you/your family use safety helmets? Yes    Do you/your family use sunscreen? Yes    Do you/your family keep firearms in the home? No    Do you/your family have a smoke detector(s)? Yes        Do you feel safe in your home? Yes    Has anyone ever touched you in an unwanted manner? No     Explain reviewed [facility] 8-           ROS  [unfilled]  PHQ-9 SCORE 4/8/2014 1/18/2018   Total Score 0 -   Total Score - 0     SANTA-7 SCORE 8/24/2016 1/18/2018   Total Score 14 (moderate anxiety)  "-   Total Score - 2         EXAM:  Blood pressure 94/61, height 1.626 m (5' 4\"), weight 46.9 kg (103 lb 4.8 oz), not currently breastfeeding. Body mass index is 17.73 kg/(m^2).  General - pleasant female in no acute distress.  Skin - no suspicious lesions or rashes  EENT-  PERRLA, euthyroid with out palpable nodules  Neck - supple without lymphadenopathy.  Musculoskeletal - no gross deformities.  Neurological - normal strength, sensation, and mental status.    Pelvic Exam:  EG/BUS: Normal genital architecture without lesions, erythema or abnormal secretions. Normal genital architecture without lesions, erythema or abnormal secretions Bartholin's, Urethra, [place]'s normal   Urethral meatus: normal   Urethra: no masses, tenderness, or scarring   Bladder: no masses or tenderness   Vagina: moist, pink, rugae with white, foul smelling and milky  secretions  Uterus: anteverted,   Adnexa: Within normal limits and No masses, nodularity, tenderness  Rectum:anus normal   wet prep: positive whiff, positive clue PH 5,no yeast      ASSESSMENT:  Encounter Diagnosis   Name Primary?     Bacterial vaginosis Yes        PLAN:     Metrogel vaginal cream as prescribed  Reviewed healthy vulvar hygiene, avoidance of dyes, scents,tight clothing    Bev LA        "

## 2018-07-12 NOTE — NURSING NOTE
Chief Complaint   Patient presents with     Vaginal Problem     Pt c/o vaginal itching, increase in discharge and noticed an odor.

## 2018-07-12 NOTE — PATIENT INSTRUCTIONS

## 2018-08-20 ENCOUNTER — TELEPHONE (OUTPATIENT)
Dept: OBGYN | Facility: CLINIC | Age: 48
End: 2018-08-20

## 2018-08-20 DIAGNOSIS — R30.0 DYSURIA: Primary | ICD-10-CM

## 2018-08-20 RX ORDER — SULFAMETHOXAZOLE/TRIMETHOPRIM 800-160 MG
1 TABLET ORAL 2 TIMES DAILY
Qty: 6 TABLET | Refills: 0 | Status: SHIPPED | OUTPATIENT
Start: 2018-08-20 | End: 2018-08-24

## 2018-08-20 NOTE — TELEPHONE ENCOUNTER
Spoke with Kay who was seen about a month ago for BV symptoms.She is now having UTI symptoms of urgency, burning, and frequency. She denies fever but is feeling like she is on the verge of a fever, Denies any lower back pain. Nurse able to send bactrim per protocol, patient to call back tomorrow and schedule with Bev Jones for Wednesday. Patient agreeable. Rx sent.

## 2018-08-23 ENCOUNTER — TELEPHONE (OUTPATIENT)
Dept: OBGYN | Facility: CLINIC | Age: 48
End: 2018-08-23

## 2018-08-23 NOTE — TELEPHONE ENCOUNTER
Received callback from Kay. She reports she continues to have the following symptoms: watery discharge with slight odor, tingling in vagina, mild irritation, dark cloudy urine, mild burning with urination.  She was recently given 3 day course of Bactrim DS by nurse triage for UTI symptoms.  She was given metrogel on 7/12/18 and had some left over. She has used a five day course x2 since then with relief of symptoms but they do not fully go away and then come back again.    Advised she be seen in clinic for evaluation and she agreed with plan. Was scheduled for tomorrow at 1130 with NP.

## 2018-08-24 ENCOUNTER — OFFICE VISIT (OUTPATIENT)
Dept: OBGYN | Facility: CLINIC | Age: 48
End: 2018-08-24
Attending: NURSE PRACTITIONER
Payer: COMMERCIAL

## 2018-08-24 VITALS
DIASTOLIC BLOOD PRESSURE: 60 MMHG | BODY MASS INDEX: 17.52 KG/M2 | HEART RATE: 57 BPM | SYSTOLIC BLOOD PRESSURE: 91 MMHG | HEIGHT: 64 IN | WEIGHT: 102.6 LBS

## 2018-08-24 DIAGNOSIS — R30.0 DYSURIA: ICD-10-CM

## 2018-08-24 DIAGNOSIS — N94.89 VAGINAL BURNING: ICD-10-CM

## 2018-08-24 DIAGNOSIS — N95.2 ATROPHIC VAGINITIS: Primary | ICD-10-CM

## 2018-08-24 LAB
CLUE CELLS: NORMAL
TRICHOMONAS (WET PREP): NORMAL
YEAST (WET PREP): NORMAL

## 2018-08-24 PROCEDURE — 87186 SC STD MICRODIL/AGAR DIL: CPT | Performed by: NURSE PRACTITIONER

## 2018-08-24 PROCEDURE — 87086 URINE CULTURE/COLONY COUNT: CPT | Performed by: NURSE PRACTITIONER

## 2018-08-24 PROCEDURE — 87210 SMEAR WET MOUNT SALINE/INK: CPT | Mod: ZF | Performed by: NURSE PRACTITIONER

## 2018-08-24 PROCEDURE — 87088 URINE BACTERIA CULTURE: CPT | Performed by: NURSE PRACTITIONER

## 2018-08-24 RX ORDER — ESTRADIOL 10 UG/1
INSERT VAGINAL
Qty: 18 TABLET | Refills: 0 | Status: SHIPPED | OUTPATIENT
Start: 2018-08-24 | End: 2020-06-30

## 2018-08-24 NOTE — MR AVS SNAPSHOT
"              After Visit Summary   8/24/2018    Kay Ceja    MRN: [id number]           Patient Information     Date Of Birth          1970        Visit Information        Provider Department      8/24/2018 11:30 AM Simona Bravo APRN [facility]        Today's Diagnoses     Atrophic vaginitis    -  1    Vaginal burning        Dysuria           Follow-ups after your visit        Who to contact     Please call your clinic at [phone] to:    Ask questions about your health    Make or cancel appointments    Discuss your medicines    Learn about your test results    Speak to your doctor            Additional Information About Your Visit        MyChart Information     [employer] gives you secure access to your electronic health record. If you see a primary care provider, you can also send messages to your care team and make appointments. If you have questions, please call your primary care clinic.  If you do not have a primary care provider, please call [phone] and they will assist you.      [employer] is an electronic gateway that provides easy, online access to your medical records. With [employer] you can request a clinic appointment, read your test results, renew a prescription or communicate with your care team.     To access your existing account, please contact your [facility] or call [phone] for assistance.        Care EveryWhere ID     This is your Care EveryWhere ID. This could be used by other organizations to access your [facility] medical records  [id number]        Your Vitals Were     Pulse Height BMI (Body Mass Index)             57 1.626 m (5' 4\") 17.61 kg/m2          Blood Pressure from Last 3 Encounters:   08/24/18 91/60   07/12/18 94/61   01/18/18 105/67    Weight from Last 3 Encounters:   08/24/18 46.5 kg (102 lb 9.6 oz)   07/12/18 46.9 kg (103 lb 4.8 oz)   01/18/18 47.6 kg (105 lb)              We Performed the Following  "    Urine Culture Aerobic Bacterial     Wet Prep POCT          Today's Medication Changes          These changes are accurate as of 8/24/18  1:06 PM.  If you have any questions, ask your nurse or doctor.               Start taking these medicines.        Dose/Directions    estradiol 10 MCG Tabs vaginal tablet   Commonly known as:  VAGIFEM   Used for:  Atrophic vaginitis   Started by:  Simona Bravo APRN CNP        Place 1 tablet in the vagina once daily for 2 weeks; then decrease frequency to twice per week.   Quantity:  18 tablet   Refills:  0         Stop taking these medicines if you haven't already. Please contact your care team if you have questions.     sulfamethoxazole-trimethoprim 800-160 MG per tablet   Commonly known as:  BACTRIM DS/SEPTRA DS   Stopped by:  Simona Bravo APRN CNP                Where to get your medicines      These medications were sent to [place] [address] [place] [facility] [address] [facility]  [address] [facility] [address]     Phone:  [phone]     estradiol 10 MCG Tabs vaginal tablet                Primary Care Provider Office Phone # Fax #    Kay Buck [phone]       [address] [facility] [address]  [facility] [address]        Equal Access to Services     [facility] Center[facility] AH: Hadii ayala kelly Soblanche, waaxda luqadaha, qaybta kaalmada adeegyada, liz ann. So [facility] [phone].    ATENCIÓN: Si habla español, tiene a mahoney disposición servicios gratuitos de asistencia lingüística. Arun al [phone].    We comply with applicable federal civil rights laws and [place] laws. We do not discriminate on the basis of race, color, national origin, age, disability, sex, sexual orientation, or gender identity.            Thank you!     Thank you for choosing [facility]  for your care. Our goal is always to provide you with excellent care. Hearing back from our patients is one way we can  continue to improve our services. Please take a few minutes to complete the written survey that you may receive in the mail after your visit with us. Thank you!             Your Updated Medication List - Protect others around you: Learn how to safely use, store and throw away your medicines at www.disposemymeds.org.          This list is accurate as of 8/24/18  1:06 PM.  Always use your most recent med list.                   Brand Name Dispense Instructions for use Diagnosis    CALCIUM 1200 PO      Take 1 tablet by mouth daily Reported on 3/23/2017        clonazePAM 0.5 MG tablet    klonoPIN    30 tablet    Take 1 tablet (0.5 mg) by mouth nightly as needed for other (anxiety and sleep)    Insomnia, unspecified type       estradiol 10 MCG Tabs vaginal tablet    VAGIFEM    18 tablet    Place 1 tablet in the vagina once daily for 2 weeks; then decrease frequency to twice per week.    Atrophic vaginitis       fish oil-omega-3 fatty acids 1000 MG capsule      Take 1 g by mouth daily Reported on 3/23/2017        MULTIVITAMIN & MINERAL PO      Take  by mouth.        omega-3 acid ethyl esters 1 g capsule    Lovaza    90 capsule    Take 3 capsules (3 g) by mouth daily    Back pain       tretinoin 0.025 % cream    RETIN-A    45 g    Apply a pea-size amount to entire face nightly at bedtime.    Senile sebaceous gland hyperplasia

## 2018-08-24 NOTE — LETTER
"8/24/2018       RE: Kay Ceja  [address]  [facility] [address]     Dear Colleague,    Thank you for referring your patient, Kay Ceja, to the [facility] at [facility]. Please see a copy of my visit note below.    SUBJECTIVE:  Kay Ceja is a 47 yr old post-menopausal female who presents to clinic today with the following concerns:    1. Urinary symptoms: Reports urinary urgency and dysuria that she self treated with cranberry extract and took Bactrim x3 days (prescribed when she called triage with symptoms) and symptoms are now improved, but she is unsure if they are completely gone. Finished the last Bactrim dose 2 days ago. Denies fever and CVA tenderness.    2. Vulvovaginal tingling and burning accompanied by watery discharge: Kay was diagnosed with BV on July 12, 2018. She was treated with Metrogel (intravaginal).  She states her symptoms did not improve so she completed a 2nd round of Metrogel x 5 days.  Discharge and burning were nearly gone.  In the last week or so, though, the symptoms seem to have \"flared\" up again.  Denies fishy odor. Denies vaginal dryness and pelvic pain or bloating.     Kay is not sexually active.     Periods stopped in 30's.    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    MRI otherwise normal     Insomnia      Osteopenia     T score -1.9 hip and back 1/19/11     Vasomotor rhinitis     uses nasalcort     Past Surgical History:   Procedure Laterality Date     basal cell ca excision       MOHS MICROGRAPHIC PROCEDURE       Family History   Problem Relation Age of Onset     EVELIN Maternal Grandmother      SAIMA. Maternal Grandfather      Breast Cancer Mother      Bipolar Disorder Father      suicide at age 42     Depression Brother      Suicide     Cancer No family hx of      " "skin cancer     Melanoma No family hx of      Skin Cancer No family hx of      OBJECTIVE:  BP 91/60  Pulse 57  Ht 1.626 m (5' 4\")  Wt 46.5 kg (102 lb 9.6 oz)  BMI 17.61 kg/m2  General: pleasant female in no acute distress  Abdomen: soft, non-tender; no CVA tenderness  Pelvic Exam:  Vulva: No external lesions, normal hair distribution, no adenopathy  Vagina: mild estrogen loss atrophy present; male pink tissue; Moist, scant amount of discharge without odor; rugae present, no lesions  Cervix: nulliparous, smooth, pink, no visible lesions  Uterus: Normal size, anteverted, non-tender, mobile  Ovaries: No mass, non-tender, mobile    Wet prep: pH=5.6; negative for clue cells, trich, and yeast; Parabasal cells present    ASSESSMENT:  Atrophic vaginitis  (primary encounter diagnosis)  Vaginal burning    PLAN:  Urine culture sent to the lab; patient will be notified when results are available  Treat vaginitis with vaginal estrogen; patient prefers Vagifem tablets. Insert 1 tablet into the vagina daily for 2 weeks, then decrease frequency to twice weekly. Will reevaluate need for continuation in 1 month.   Kay expressed understanding and agreement with the plan for care.      Again, thank you for allowing me to participate in the care of your patient.      Sincerely,    KESHIA Haro CNP      "

## 2018-08-24 NOTE — PROGRESS NOTES
"SUBJECTIVE:  Kay Ceja is a 47 yr old post-menopausal female who presents to clinic today with the following concerns:    1. Urinary symptoms: Reports urinary urgency and dysuria that she self treated with cranberry extract and took Bactrim x3 days (prescribed when she called triage with symptoms) and symptoms are now improved, but she is unsure if they are completely gone. Finished the last Bactrim dose 2 days ago. Denies fever and CVA tenderness.    2. Vulvovaginal tingling and burning accompanied by watery discharge: Kay was diagnosed with BV on July 12, 2018. She was treated with Metrogel (intravaginal).  She states her symptoms did not improve so she completed a 2nd round of Metrogel x 5 days.  Discharge and burning were nearly gone.  In the last week or so, though, the symptoms seem to have \"flared\" up again.  Denies fishy odor. Denies vaginal dryness and pelvic pain or bloating.     Kay is not sexually active.     Periods stopped in 30's.    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    MRI otherwise normal     Insomnia      Osteopenia     T score -1.9 hip and back 1/19/11     Vasomotor rhinitis     uses nasalcort     Past Surgical History:   Procedure Laterality Date     basal cell ca excision       MOHS MICROGRAPHIC PROCEDURE       Family History   Problem Relation Age of Onset     C.A.D. Maternal Grandmother      C.A.D. Maternal Grandfather      Breast Cancer Mother      Bipolar Disorder Father      suicide at age 42     Depression Brother      Suicide     Cancer No family hx of      skin cancer     Melanoma No family hx of      Skin Cancer No family hx of      OBJECTIVE:  BP 91/60  Pulse 57  Ht 1.626 m (5' 4\")  Wt 46.5 kg (102 lb 9.6 oz)  BMI 17.61 kg/m2  General: pleasant female in no acute distress  Abdomen: soft, non-tender; no CVA tenderness  Pelvic " Exam:  Vulva: No external lesions, normal hair distribution, no adenopathy  Vagina: mild estrogen loss atrophy present; male pink tissue; Moist, scant amount of discharge without odor; rugae present, no lesions  Cervix: nulliparous, smooth, pink, no visible lesions  Uterus: Normal size, anteverted, non-tender, mobile  Ovaries: No mass, non-tender, mobile    Wet prep: pH=5.6; negative for clue cells, trich, and yeast; Parabasal cells present    ASSESSMENT:  Atrophic vaginitis  (primary encounter diagnosis)  Vaginal burning    PLAN:  Urine culture sent to the lab; patient will be notified when results are available  Treat vaginitis with vaginal estrogen; patient prefers Vagifem tablets. Insert 1 tablet into the vagina daily for 2 weeks, then decrease frequency to twice weekly. Will reevaluate need for continuation in 1 month.   Kay expressed understanding and agreement with the plan for care.    Simona Bravo, DNP, APRN, WHNP

## 2018-08-26 LAB
BACTERIA SPEC CULT: ABNORMAL
Lab: ABNORMAL
SPECIMEN SOURCE: ABNORMAL

## 2018-08-28 ENCOUNTER — TELEPHONE (OUTPATIENT)
Dept: OBGYN | Facility: CLINIC | Age: 48
End: 2018-08-28

## 2018-08-28 DIAGNOSIS — N30.01 ACUTE CYSTITIS WITH HEMATURIA: Primary | ICD-10-CM

## 2018-08-28 RX ORDER — NITROFURANTOIN 25; 75 MG/1; MG/1
100 CAPSULE ORAL 2 TIMES DAILY
Qty: 10 CAPSULE | Refills: 0 | Status: SHIPPED | OUTPATIENT
Start: 2018-08-28 | End: 2018-09-02

## 2018-08-28 NOTE — TELEPHONE ENCOUNTER
Patient returned call to [facility] for urine culture results. Still having some urinary urgency. Instructed patient to take Macrobid PO BID x 5 days and counseled her on the importance of finishing the antibiotic. Kay expressed understanding and agreement with this plan for care. She has started the Vagifem tablets, but has not noticed any difference in vaginal symptoms yet. She had no further questions at the end of this call.    Simona Bravo, DNP, APRN, WHNP

## 2018-08-28 NOTE — TELEPHONE ENCOUNTER
Attempted to reach Kay to notify her of her urine culture results. Left voicemail requesting patient return call to [facility].    Urine culture showed presence of 10,000-50,000 colonies/mL E.Coli; given that patient was symptomatic, Rx provided for Macrobid 100mg BID x 5 days. Sent to [facility] in [place] on [address] [facility].     Simona Bravo, KIMBERLY, APRN, WHNP

## 2018-09-19 ENCOUNTER — TELEPHONE (OUTPATIENT)
Dept: OBGYN | Facility: CLINIC | Age: 48
End: 2018-09-19

## 2018-09-19 NOTE — TELEPHONE ENCOUNTER
Received call from Kay stating she has a hemorrhoid but has never had one before. She did some research online and found that draining it is an effective treatment so she is wondering if she can come to clinic today to have it drained.    Discussed that draining is not a first line treatment. Use OTC products and pain relievers as needed. It will go away on its own in a few days but it is normal for it to be painful and itchy.   If it does not resolve, call back. She indicated understanding and agreed with plan.

## 2018-09-20 NOTE — TELEPHONE ENCOUNTER
Received refill request for vagifem.(yuvafem)  Last in clinic 8/24/18 when it was prescribed.  Plan:Treat vaginitis with vaginal estrogen; patient prefers Vagifem tablets. Insert 1 tablet into the vagina daily for 2 weeks, then decrease frequency to twice weekly. Will reevaluate need for continuation in 1 month.   Kay expressed understanding and agreement with the plan for care.      Attempted to reach Kay,asked her to call back to nurse triage if happy with medication and wants Simona Bravo  to refill it. If yes we can send it to Simona for approval.

## 2018-09-24 NOTE — TELEPHONE ENCOUNTER
Received another refill request for yuvafem.  Left message for pt via [employer] (phone voicemail was full so could not leave a message) to contact us and let us know how this medication is working for her.

## 2018-10-02 NOTE — TELEPHONE ENCOUNTER
Received note from Kay (copied below) that no refill is needed.    Kay Ceja sent to Andreea Pan RN        Phone Number: [phone]                     Hello, I actually have not used them yet as was wanting to assess the tx of urinary infection independently. No refill needed

## 2018-10-23 ENCOUNTER — OFFICE VISIT (OUTPATIENT)
Dept: DERMATOLOGY | Facility: CLINIC | Age: 48
End: 2018-10-23
Payer: COMMERCIAL

## 2018-10-23 DIAGNOSIS — L82.1 SEBORRHEIC KERATOSES: ICD-10-CM

## 2018-10-23 DIAGNOSIS — Z85.828 HISTORY OF NONMELANOMA SKIN CANCER: ICD-10-CM

## 2018-10-23 DIAGNOSIS — D22.9 MULTIPLE BENIGN NEVI: Primary | ICD-10-CM

## 2018-10-23 ASSESSMENT — PAIN SCALES - GENERAL: PAINLEVEL: NO PAIN (0)

## 2018-10-23 NOTE — NURSING NOTE
Dermatology Rooming Note    Kay Ceja's goals for this visit include:   Chief Complaint   Patient presents with     Skin Check     Kay is here today for a skin check- notes some areas of concern on her back.      JOSY Sheets

## 2018-10-23 NOTE — MR AVS SNAPSHOT
After Visit Summary   10/23/2018    Kay Ceja    MRN: [id number]           Patient Information     Date Of Birth          1970        Visit Information        Provider Department      10/23/2018 10:45 AM Hortensia Ware PA-C [facility]        Today's Diagnoses     Multiple benign nevi    -  1    History of nonmelanoma skin cancer        Seborrheic keratoses           Follow-ups after your visit        Follow-up notes from your care team     Return in about 1 year (around 10/23/2019).      Who to contact     Please call your clinic at [phone] to:    Ask questions about your health    Make or cancel appointments    Discuss your medicines    Learn about your test results    Speak to your doctor            Additional Information About Your Visit        [employer] Information     [email or web address] gives you secure access to your electronic health record. If you see a primary care provider, you can also send messages to your care team and make appointments. If you have questions, please call your primary care clinic.  If you do not have a primary care provider, please call [phone] and they will assist you.      [email or web address] is an electronic gateway that provides easy, online access to your medical records. With [email or web address], you can request a clinic appointment, read your test results, renew a prescription or communicate with your care team.     To access your existing account, please contact your [facility] or call [phone] for assistance.        Care EveryWhere ID     This is your Care EveryWhere ID. This could be used by other organizations to access your [facility] medical records  [id number]         Blood Pressure from Last 3 Encounters:   08/24/18 91/60   07/12/18 94/61   01/18/18 105/67    Weight from Last 3 Encounters:   08/24/18 46.5 kg (102 lb 9.6 oz)   07/12/18 46.9 kg (103 lb 4.8 oz)   01/18/18 47.6 kg (105 lb)              Today, you had the following      No orders found for display       Primary Care Provider Office Phone # Fax #    Kay Buck [phone]       [address]  [facility] [address]        Equal Access to Services     DIETER FORRESTER : Hadii aad ku hadmatilde Soblanche, waaxda luqadaha, qaybta kaalmada adeisaccyada, liz valealfredo marcanoisacc pradocristiana ann. So [facility] [phone].    ATENCIÓN: Si habla español, tiene a mahoney disposición servicios gratuitos de asistencia lingüística. Llame al [phone].    We comply with applicable federal civil rights laws and [place] laws. We do not discriminate on the basis of race, color, national origin, age, disability, sex, sexual orientation, or gender identity.            Thank you!     Thank you for choosing [facility]  for your care. Our goal is always to provide you with excellent care. Hearing back from our patients is one way we can continue to improve our services. Please take a few minutes to complete the written survey that you may receive in the mail after your visit with us. Thank you!             Your Updated Medication List - Protect others around you: Learn how to safely use, store and throw away your medicines at www.disposemymeds.org.          This list is accurate as of 10/23/18 12:41 PM.  Always use your most recent med list.                   Brand Name Dispense Instructions for use Diagnosis    CALCIUM 1200 PO      Take 1 tablet by mouth daily Reported on 3/23/2017        clonazePAM 0.5 MG tablet    klonoPIN    30 tablet    Take 1 tablet (0.5 mg) by mouth nightly as needed for other (anxiety and sleep)    Insomnia, unspecified type       estradiol 10 MCG Tabs vaginal tablet    VAGIFEM    18 tablet    Place 1 tablet in the vagina once daily for 2 weeks; then decrease frequency to twice per week.    Atrophic vaginitis       fish oil-omega-3 fatty acids 1000 MG capsule      Take 1 g by mouth daily Reported on 3/23/2017        MULTIVITAMIN & MINERAL PO      Take   by mouth.        omega-3 acid ethyl esters 1 g capsule    Lovaza    90 capsule    Take 3 capsules (3 g) by mouth daily    Back pain       tretinoin 0.025 % cream    RETIN-A    45 g    Apply a pea-size amount to entire face nightly at bedtime.    Senile sebaceous gland hyperplasia

## 2018-10-23 NOTE — PROGRESS NOTES
"[facility] Dermatology Note      Dermatology Problem List:  1. History of NMSC:              - BCC, R superior forehead s/p MMS 10/2014              - BCC, R Pentecostalism, s/p MMS in [place] in 2012  2. Multiple (>100) atypical nevi on trunk and extremities. Prior bxs with moderate dysplasia.  3. AKs, improved after field therapy with  5-FU 1-2x/week  4. Flat warts, dorsal PIPs. Cryo 3/14/2016.  5. Acne. Benzaclin.  6. Sebaceous hyperplasia. Tretinoin 0.025% cream.  7. 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 Resolved with TMC 0.1% cream  8. Seborrheic Keratosis     Encounter Date: Oct 23, 2018    CC:  Chief Complaint   Patient presents with     Skin Check     Kay is here today for a skin check- notes some areas of concern on her back.          History of Present Illness:  Ms. Kay Ceja is a 48 year old female with a history of NMSC x2 who presents as a follow-up for full body skin exam. The patient was last seen 03/23/2018 by Dr. Francis. The patient has a history of AKs and flat warts. Patient has no family history of skin cancer.Today the patient reports that she has a couple spots on her back that she would like us to look at. States that they are not moles but describes them as \"crusty skin\". To areas of pain, or bleeding, or tenderness. Patient does report that she uses sunscreen. The patient denies painful, itching, tingling or bleeding lesions unless otherwise noted.    She did treat her face with Efudex a year or two ago. She said no areas were reactive and therefore she did not feel Aks were a problem for her. She is otherwise feeling well today.     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 Her smoking use included Cigarettes. She has a 1.00 pack-year smoking history. She has never used smokeless tobacco. She reports that she does not drink alcohol or use illicit drugs.    Family History:  Family History   Problem Relation Age of Onset     C.A.D. Maternal Grandmother      C.A.D. Maternal Grandfather      Breast Cancer Mother      Bipolar Disorder Father      suicide at age 42     Depression Brother      Suicide     Cancer No family hx of      skin cancer     Melanoma No family hx of      Skin Cancer No family hx of        Medications:  Current Outpatient Prescriptions   Medication Sig Dispense Refill     Calcium Carbonate-Vit D-Min (CALCIUM 1200 PO) Take 1 tablet by mouth daily Reported on 3/23/2017       clonazePAM (KLONOPIN) 0.5 MG tablet Take 1 tablet (0.5 mg) by mouth nightly as needed for other (anxiety and sleep) 30 tablet 0     estradiol (VAGIFEM) 10 MCG TABS vaginal tablet Place 1 tablet in the vagina once daily for 2 weeks; then decrease frequency to twice per week. 18 tablet 0     fish oil-omega-3 fatty acids (FISH OIL) 1000 MG capsule Take 1 g by mouth daily Reported on 3/23/2017       Multiple Vitamins-Minerals (MULTIVITAMIN & MINERAL PO) Take  by mouth.       omega-3 acid ethyl esters (LOVAZA) 1 G capsule Take 3 capsules (3 g) by mouth daily 90 capsule 11     tretinoin (RETIN-A) 0.025 % cream Apply a pea-size amount to entire face nightly at bedtime. 45 g 11       No Known  Allergies    Review of Systems:  -Constitutional: The patient denies fatigue, fevers, chills, unintended weight loss, and night sweats.  -Skin: As above in HPI. No additional skin concerns.    Physical exam:  Vitals: There were no vitals taken for this visit.  GEN: This is a well developed, well-nourished female in no acute distress, in a pleasant mood.    SKIN: Full skin, which includes the head/face, both arms, chest, back, abdomen,both legs, genitalia and/or groin buttocks, digits and/or nails, was examined.  -Vickers's skin type II , Greater then 100 Nevi (a lot of variety in her nevi)   -No evidence of reoccurrence lesions on the right superior forehead and right temple   -There are waxy stuck on tan to brown papules on the middle of the back   -No other lesions of concern on areas examined.       Impression/Plan:  1. History of nonmelanoma skin cancer, no clincial evidence of recurrence- forehead/temple     ABCDs of melanoma were discussed and self skin checks were advised.     Sun precaution was advised including the use of sun screens of SPF 30 or higher, sun protective clothing, and avoidance of tanning beds.    Patient is great candidate for full body mole mapping - will refer to Dr. Belle for this at some point within the next year    2. Multiple benign nevi - scattered on body - nevi with a variety of colors, shapes and sizes    ABCD's of melanoma were reviewed with patient.     Patient to have full body mole mapping with Dr. Belle at some point within the next year    3. Seborrheic keratosis x2 located in the middle of the back,  non irritated    Reassured patient that these are benign     Explained the etiology     No further intervention required. Patient to report changes.     CC Dr. Hancock on close of this encounter.  Follow-up in 1 year, earlier for new or changing lesions.       Staff Involved:    Scribe Disclosure  I, Caleb Chris, am serving as a scribe to document services  personally performed by Hortensia Ware PA-C, based on data collection and the provider's statements to me.     Provider Disclosure:   The documentation recorded by the scribe accurately reflects the services I personally performed and the decisions made by me.    All risks, benefits and alternatives were discussed with patient.  Patient is in agreement and understands the assessment and plan.  All questions were answered.    Hortensia Ware PA-C  [facility]: Phone: [phone], Fax: [phone]

## 2018-10-23 NOTE — LETTER
"10/23/2018     RE: Kay Ceja  [address]  [place] [facility] [address]     Dear Colleague,    Thank you for referring your patient, Kay Ceja, to the [facility] DERMATOLOGY at [facility]. Please see a copy of my visit note below.    [facility] Dermatology Note      Dermatology Problem List:  1. History of NMSC:              - BCC, R superior forehead s/p MMS 10/2014              - BCC, R Zoroastrianism, s/p MMS in [place] in 2012  2. Multiple (>100) atypical nevi on trunk and extremities. Prior bxs with moderate dysplasia.  3. AKs, improved after field therapy with  5-FU 1-2x/week  4. Flat warts, dorsal PIPs. Cryo 3/14/2016.  5. Acne. Benzaclin.  6. Sebaceous hyperplasia. Tretinoin 0.025% cream.  7. 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 Resolved with TMC 0.1% cream  8. Seborrheic Keratosis     Encounter Date: Oct 23, 2018    CC:  Chief Complaint   Patient presents with     Skin Check     Kay is here today for a skin check- notes some areas of concern on her back.          History of Present Illness:  Ms. Kay Ceja is a 48 year old female with a history of NMSC x2 who presents as a follow-up for full body skin exam. The patient was last seen 03/23/2018 by Dr. Francis. The patient has a history of AKs and flat warts. Patient has no family history of skin cancer.Today the patient reports that she has a couple spots on her back that she would like us to look at. States that they are not moles but describes them as \"crusty skin\". To areas of pain, or bleeding, or tenderness. Patient does report that she uses sunscreen. The patient denies painful, itching, tingling or bleeding lesions unless otherwise noted.    She did treat her face with Efudex a year or two ago. She said no areas were reactive and therefore she did not feel Aks were a problem for her. She is otherwise feeling well today.     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 Her smoking use included Cigarettes. She has a 1.00 pack-year smoking history. She has never used smokeless tobacco. She reports that she does not drink alcohol or use illicit drugs.    Family History:  Family History   Problem Relation Age of Onset     C.A.D. Maternal Grandmother      C.A.D. Maternal Grandfather      Breast Cancer Mother      Bipolar Disorder Father      suicide at age 42     Depression Brother      Suicide     Cancer No family hx of      skin cancer     Melanoma No family hx of      Skin Cancer No family hx of        Medications:  Current Outpatient Prescriptions   Medication Sig Dispense Refill     Calcium Carbonate-Vit D-Min (CALCIUM 1200 PO) Take 1 tablet by mouth daily Reported on 3/23/2017       clonazePAM (KLONOPIN) 0.5 MG tablet Take 1 tablet (0.5 mg) by mouth nightly as needed for other (anxiety and sleep) 30 tablet 0     estradiol (VAGIFEM) 10 MCG TABS vaginal tablet Place 1 tablet in the vagina once daily for 2 weeks; then decrease frequency to twice per week. 18 tablet 0     fish oil-omega-3 fatty acids (FISH OIL) 1000 MG capsule Take 1 g by mouth daily Reported on 3/23/2017       Multiple Vitamins-Minerals  (MULTIVITAMIN & MINERAL PO) Take  by mouth.       omega-3 acid ethyl esters (LOVAZA) 1 G capsule Take 3 capsules (3 g) by mouth daily 90 capsule 11     tretinoin (RETIN-A) 0.025 % cream Apply a pea-size amount to entire face nightly at bedtime. 45 g 11       No Known Allergies    Review of Systems:  -Constitutional: The patient denies fatigue, fevers, chills, unintended weight loss, and night sweats.  -Skin: As above in HPI. No additional skin concerns.    Physical exam:  Vitals: There were no vitals taken for this visit.  GEN: This is a well developed, well-nourished female in no acute distress, in a pleasant mood.    SKIN: Full skin, which includes the head/face, both arms, chest, back, abdomen,both legs, genitalia and/or groin buttocks, digits and/or nails, was examined.  -Vickers's skin type II , Greater then 100 Nevi (a lot of variety in her nevi)   -No evidence of reoccurrence lesions on the right superior forehead and right temple   -There are waxy stuck on tan to brown papules on the middle of the back   -No other lesions of concern on areas examined.       Impression/Plan:  1. History of nonmelanoma skin cancer, no clincial evidence of recurrence- forehead/temple     ABCDs of melanoma were discussed and self skin checks were advised.     Sun precaution was advised including the use of sun screens of SPF 30 or higher, sun protective clothing, and avoidance of tanning beds.    Patient is great candidate for full body mole mapping - will refer to Dr. Belle for this at some point within the next year    2. Multiple benign nevi - scattered on body - nevi with a variety of colors, shapes and sizes    ABCD's of melanoma were reviewed with patient.     Patient to have full body mole mapping with Dr. Belle at some point within the next year    3. Seborrheic keratosis x2 located in the middle of the back,  non irritated    Reassured patient that these are benign     Explained the etiology     No further  intervention required. Patient to report changes.     CC Dr. Hancock on close of this encounter.  Follow-up in 1 year, earlier for new or changing lesions.       Staff Involved:    Scribe Disclosure  I, Caleb Chris, am serving as a scribe to document services personally performed by Hortensia Ware PA-C, based on data collection and the provider's statements to me.     Provider Disclosure:   The documentation recorded by the scribe accurately reflects the services I personally performed and the decisions made by me.    All risks, benefits and alternatives were discussed with patient.  Patient is in agreement and understands the assessment and plan.  All questions were answered.    Hortensia Ware PA-C  [facility]: Phone: [phone], Fax: [phone]

## 2019-06-24 ENCOUNTER — TELEPHONE (OUTPATIENT)
Dept: DERMATOLOGY | Facility: CLINIC | Age: 49
End: 2019-06-24

## 2019-08-29 ENCOUNTER — TELEPHONE (OUTPATIENT)
Dept: DERMATOLOGY | Facility: CLINIC | Age: 49
End: 2019-08-29

## 2019-08-29 NOTE — TELEPHONE ENCOUNTER
I called and left a  asking for Kay to give us a call back to discuss her upcoming appointment. We are currently not doing mole mapping and Kay would karyn to meet with Dr. Belle first and she will be the one to place a order for Mole mapping. Clinic number provided for Kay to give us a call back to discuss. We would be able to still do a skin check  if she wants just not mole mapping at this time.    JOSY Lao

## 2019-09-03 NOTE — TELEPHONE ENCOUNTER
[facility]    Phone Message    May a detailed message be left on voicemail: yes    Reason for Call: Other: Pt called back advising that her initial request was for an Rx for tretinoin. She wanted to talk with a Dr about Mole mapping but her main concern is getting a new rx for tretinoin. Pt wants to keep existing appt to do skin check, get new rx and discuss with provider about mole mapping if possible.      Action Taken: Message routed to:  [facility] ([facility]

## 2019-09-05 ENCOUNTER — OFFICE VISIT (OUTPATIENT)
Dept: DERMATOLOGY | Facility: CLINIC | Age: 49
End: 2019-09-05
Payer: COMMERCIAL

## 2019-09-05 DIAGNOSIS — L73.8 SENILE SEBACEOUS GLAND HYPERPLASIA: Primary | ICD-10-CM

## 2019-09-05 DIAGNOSIS — B07.8 COMMON WART: ICD-10-CM

## 2019-09-05 RX ORDER — TRETINOIN 0.25 MG/G
CREAM TOPICAL
Qty: 45 G | Refills: 11 | Status: SHIPPED | OUTPATIENT
Start: 2019-09-05 | End: 2022-03-01

## 2019-09-05 ASSESSMENT — PAIN SCALES - GENERAL: PAINLEVEL: NO PAIN (0)

## 2019-09-05 NOTE — LETTER
9/5/2019       RE: Kay Ceja  [address]  [place] [address]     Dear Colleague,    Thank you for referring your patient, Kay Ceja, to the [facility] DERMATOLOG[facility] at [facility]. Please see a copy of my visit note below.    [facility] Dermatology Note      Dermatology Problem List:  1. History of NMSC:              - BCC, R superior forehead s/p MMS 10/2014              - BCC, R Jain, s/p MMS in [place] in 2012  2. Multiple (>100) atypical nevi on trunk and extremities. Prior bxs with moderate dysplasia.   - will refer for Dr. Belle mole mapping  3. AKs, improved after field therapy with  5-FU 1-2x/week  4. Flat warts, dorsal PIPs. Cryo 3/14/2016.  5. Acne. Benzaclin.  6. Sebaceous hyperplasia. Tretinoin 0.025% cream.  7. 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 Resolved with TMC 0.1% cream  8. Seborrheic Keratosis     Encounter Date: Sep 5, 2019    CC:   Chief Complaint   Patient presents with     Skin Check     Kay is here today for a skin check. and she would also like some refills          History of Present Illness:  Ms. Kay Ceja is a 48 year old female who presents as a follow-up. The patient was last seen by Hortensia Ware on 10/2018 when at which time mole mapping with Dr. Belle was discussed with her. Patient was under the impression she would be able to do mole mapping today. Otherwise she reports that she has two areas where she has warts that she would like treated today.     She has been using tretinoin for sebaceous hyperplasia and would like to keep doing so. She feels it prevents new ones from appearing.     She doesn't want a full body skin exam as she would like to do mole mapping. No other skin concerns today.     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 Her smoking use included cigarettes. She has a 1.00 pack-year smoking history. She has never used smokeless tobacco. She reports that she does not drink alcohol or use drugs.    Family History:  Family History   Problem Relation Age of Onset     C.A.D. Maternal Grandmother      C.A.D. Maternal Grandfather      Breast Cancer Mother      Bipolar Disorder Father         suicide at age 42     Depression Brother         Suicide     Cancer No family hx of         skin cancer     Melanoma No family hx of      Skin Cancer No family hx of        Medications:  Current Outpatient Medications   Medication Sig Dispense Refill     Calcium Carbonate-Vit D-Min (CALCIUM 1200 PO) Take 1 tablet by mouth daily Reported on 3/23/2017       clonazePAM (KLONOPIN) 0.5 MG tablet Take 1 tablet (0.5 mg) by mouth nightly as needed for other (anxiety and sleep) 30 tablet 0     estradiol (VAGIFEM) 10 MCG TABS vaginal tablet Place 1 tablet in the vagina once daily for 2 weeks; then decrease frequency to twice per week. 18 tablet 0     fish oil-omega-3 fatty acids (FISH OIL) 1000 MG capsule Take 1 g by mouth daily Reported on 3/23/2017       Multiple Vitamins-Minerals (MULTIVITAMIN & MINERAL PO) Take  by mouth.       omega-3 acid ethyl esters (LOVAZA) 1 G capsule Take 3 capsules (3 g) by mouth daily  90 capsule 11     tretinoin (RETIN-A) 0.025 % cream Apply a pea-size amount to entire face nightly at bedtime. 45 g 11        No Known Allergies      Review of Systems:  -As per HPI  -Constitutional: Otherwise feeling well today, in usual state of health.  -HEENT: Patient denies nonhealing oral sores.  -Skin: As above in HPI. No additional skin concerns.    Physical exam:  Vitals: There were no vitals taken for this visit.  GEN: This is a well developed, well-nourished female in no acute distress, in a pleasant mood.    SKIN: Focused examination of the face and hands was performed.  -Vickers skin type: II  -There are yellow oily papules with central dells on the forehead and cheeks.   -There are tiny 2-3 mm verrucous papules with thrombosed capillaries interrupting dermatoglyphics on the left index finger x 3 and on the right index finger x 1.  -No other lesions of concern on areas examined.     Impression/Plan:  1. Verruca vulgaris    Cryotherapy procedure note: After verbal consent and discussion of risks and benefits including but no limited to dyspigmentation/scar, blister, and pain, 4 was(were) treated with 1-2mm freeze border for 2 cycles with liquid nitrogen. Post cryotherapy instructions were provided.    2.  Multiple (>100) atypical nevi on trunk and extremities. Prior bxs with moderate dysplasia.     will schedule patient with Dr. Belle for initial assessment to see if she would be appropriate candidate for mole mapping.    3. Sebaceous hyperplasia:     Continue tretinoin 0.25% cream nightly        CC Referred Self, MD  No address on file on close of this encounter.  Follow-up prn for new or changing lesions. .       Dr. Hancock staffed the patient.    Staff Involved:  Resident(Ross)/Staff    Rafael Hawkins MD, PhD  Medicine-Dermatology PGY-4  I was present for the entire procedure. Joanne Hancock MD  I, Joanne Hancock MD, saw this patient with the resident and agree with the resident s  findings and plan of care as documented in the resident s note.

## 2019-09-05 NOTE — PROGRESS NOTES
[facility] Dermatology Note      Dermatology Problem List:  1. History of NMSC:              - BCC, R superior forehead s/p MMS 10/2014              - BCC, R Christianity, s/p MMS in [place] in 2012  2. Multiple (>100) atypical nevi on trunk and extremities. Prior bxs with moderate dysplasia.   - will refer for Dr. Belle mole mapping  3. AKs, improved after field therapy with  5-FU 1-2x/week  4. Flat warts, dorsal PIPs. Cryo 3/14/2016.  5. Acne. Benzaclin.  6. Sebaceous hyperplasia. Tretinoin 0.025% cream.  7. 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 Resolved with TMC 0.1% cream  8. Seborrheic Keratosis     Encounter Date: Sep 5, 2019    CC:   Chief Complaint   Patient presents with     Skin Check     Kay is here today for a skin check. and she would also like some refills          History of Present Illness:  Ms. Kay Ceja is a 48 year old female who presents as a follow-up. The patient was last seen by Hortensia Ware on 10/2018 when at which time mole mapping with Dr. Belle was discussed with her. Patient was under the impression she would be able to do mole mapping today. Otherwise she reports that she has two areas where she has warts that she would like treated today.     She has been using tretinoin for sebaceous hyperplasia and would like to keep doing so. She feels it prevents new ones from appearing.     She doesn't want a full body skin exam as she would like to do mole mapping. No other skin concerns today.     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 Her smoking use included cigarettes. She has a 1.00 pack-year smoking history. She has never used smokeless tobacco. She reports that she does not drink alcohol or use drugs.    Family History:  Family History   Problem Relation Age of Onset     C.A.D. Maternal Grandmother      CChiragAJHOANA. Maternal Grandfather      Breast Cancer Mother      Bipolar Disorder Father         suicide at age 42     Depression Brother         Suicide     Cancer No family hx of         skin cancer     Melanoma No family hx of      Skin Cancer No family hx of        Medications:  Current Outpatient Medications   Medication Sig Dispense Refill     Calcium Carbonate-Vit D-Min (CALCIUM 1200 PO) Take 1 tablet by mouth daily Reported on 3/23/2017       clonazePAM (KLONOPIN) 0.5 MG tablet Take 1 tablet (0.5 mg) by mouth nightly as needed for other (anxiety and sleep) 30 tablet 0     estradiol (VAGIFEM) 10 MCG TABS vaginal tablet Place 1 tablet in the vagina once daily for 2 weeks; then decrease frequency to twice per week. 18 tablet 0     fish oil-omega-3 fatty acids (FISH OIL) 1000 MG capsule Take 1 g by mouth daily Reported on 3/23/2017       Multiple Vitamins-Minerals (MULTIVITAMIN & MINERAL PO) Take  by mouth.       omega-3 acid ethyl esters (LOVAZA) 1 G capsule Take 3 capsules (3 g) by mouth daily 90 capsule 11     tretinoin (RETIN-A) 0.025 % cream Apply a pea-size amount to entire face nightly at bedtime. 45 g 11        No Known Allergies      Review of Systems:  -As per HPI  -Constitutional: Otherwise feeling well today, in usual state of  health.  -HEENT: Patient denies nonhealing oral sores.  -Skin: As above in HPI. No additional skin concerns.    Physical exam:  Vitals: There were no vitals taken for this visit.  GEN: This is a well developed, well-nourished female in no acute distress, in a pleasant mood.    SKIN: Focused examination of the face and hands was performed.  -Vickers skin type: II  -There are yellow oily papules with central dells on the forehead and cheeks.   -There are tiny 2-3 mm verrucous papules with thrombosed capillaries interrupting dermatoglyphics on the left index finger x 3 and on the right index finger x 1.  -No other lesions of concern on areas examined.     Impression/Plan:  1. Verruca vulgaris    Cryotherapy procedure note: After verbal consent and discussion of risks and benefits including but no limited to dyspigmentation/scar, blister, and pain, 4 was(were) treated with 1-2mm freeze border for 2 cycles with liquid nitrogen. Post cryotherapy instructions were provided.    2.  Multiple (>100) atypical nevi on trunk and extremities. Prior bxs with moderate dysplasia.     will schedule patient with Dr. Belle for initial assessment to see if she would be appropriate candidate for mole mapping.    3. Sebaceous hyperplasia:     Continue tretinoin 0.25% cream nightly        CC Referred Self, MD  No address on file on close of this encounter.  Follow-up prn for new or changing lesions. .       Dr. Hancock staffed the patient.    Staff Involved:  Resident(Ross)/Staff    Rafael Hawkins MD, PhD  Medicine-Dermatology PGY-4  I was present for the entire procedure. Joanne Hancock MD  I, Joanen Hancock MD, saw this patient with the resident and agree with the resident s findings and plan of care as documented in the resident s note.

## 2019-09-05 NOTE — NURSING NOTE
Dermatology Rooming Note    Kay Marcial's goals for this visit include:   Chief Complaint   Patient presents with     Skin Check     Kay is here today for a skin check. and she would also like some refills      JOSY Lao

## 2019-09-05 NOTE — PATIENT INSTRUCTIONS
- will refer to Dr. Belle for consideration of mole mapping    Cryotherapy    What is it?    Use of a very cold liquid, such as liquid nitrogen, to freeze and destroy abnormal skin cells that need to be removed    What should I expect?    Tenderness and redness    A small blister that might grow and fill with dark purple blood. There may be crusting.    More than one treatment may be needed if the lesions do not go away.    How do I care for the treated area?    Gently wash the area with your hands when bathing.    Use a thin layer of Vaseline to help with healing. You may use a Band-Aid.     The area should heal within 7-10 days and may leave behind a pink or lighter color.     Do not use an antibiotic or Neosporin ointment.     You may take acetaminophen (Tylenol) for pain.     Call your Doctor if you have:    Severe pain    Signs of infection (warmth, redness, cloudy yellow drainage, and or a bad smell)    Questions or concerns    Who should I call with questions?       [facility]: [phone]       [facility]: [phone]       For urgent needs outside of business hours call the [facility] at [phone]        and ask for the dermatology resident on call

## 2019-09-16 ENCOUNTER — TELEPHONE (OUTPATIENT)
Dept: DERMATOLOGY | Facility: CLINIC | Age: 49
End: 2019-09-16

## 2019-09-16 NOTE — TELEPHONE ENCOUNTER
[facility] Call Center    Phone Message    May a detailed message be left on voicemail: yes    Reason for Call: Medication Question or concern regarding medication   Prescription Clarification  Name of Medication: tretinoin (RETIN-A) 0.025 % external cream  Prescribing Provider: Dr Rafael Hawkins   Pharmacy: [facility] [id number] [place], [place] - [address] [place] [address]   What on the order needs clarification? Medication needs prior auth. Insurance through [employer].           Action Taken: Message routed to:  [facility]

## 2019-09-19 NOTE — TELEPHONE ENCOUNTER
PA Initiation    Medication: tretinoin (RETIN-A) 0.025 % cream -   Insurance Company: [employer] - Phone [phone] Fax [phone]  Pharmacy Filling the Rx: [id number] - [place], [place] - [address]  Filling Pharmacy Phone: [phone]  Filling Pharmacy Fax: [phone]  Start Date: 9/19/2019

## 2019-09-20 NOTE — TELEPHONE ENCOUNTER
Prior Authorization Approval    Medication: tretinoin (RETIN-A) 0.025 % cream - APPROVED was approved on 9/19/2019  Effective: 9/5/2019 to 9/5/2020  Reference #:    Approved Dose/Quantity:   Insurance Company: [employer] - Phone [phone] Fax [phone]  Expected CoPay:    Pharmacy Filling the Rx: [id number] - [place], [place] - [facility] [address] [facility]  Pharmacy Notified: Yes  Patient Notified: Comment:  **Instructed pharmacy to notify patient when script is ready to /ship.**

## 2019-10-11 ENCOUNTER — DOCUMENTATION ONLY (OUTPATIENT)
Dept: CARE COORDINATION | Facility: CLINIC | Age: 49
End: 2019-10-11

## 2020-01-25 ENCOUNTER — TELEPHONE (OUTPATIENT)
Dept: OBGYN | Facility: CLINIC | Age: 50
End: 2020-01-25

## 2020-01-25 DIAGNOSIS — Z78.0 MENOPAUSE: Primary | ICD-10-CM

## 2020-01-25 NOTE — TELEPHONE ENCOUNTER
Patient requesting order for DEXA. Last one 5/2015 and normal - done due to early menopause so had screening.    Forwarding to Dr. Buck.

## 2020-01-25 NOTE — TELEPHONE ENCOUNTER
----- Message from Fallon Pabon sent at 1/21/2020  9:23 AM CST -----  Regarding: order for Dexa scan  Pt requesting new order for a dexa scan. Pt would like to have it done the same day as her annual exam with Dr. Buck on 3/19. Pt can be reached at [phone]. Thanks.      Call center    Please DO NOT send this message and/or reply back to sender.  Call Center Representatives DO NOT respond to messages.

## 2020-02-03 ENCOUNTER — THERAPY VISIT (OUTPATIENT)
Dept: PHYSICAL THERAPY | Facility: CLINIC | Age: 50
End: 2020-02-03
Payer: COMMERCIAL

## 2020-02-03 DIAGNOSIS — M72.2 PLANTAR FASCIITIS: ICD-10-CM

## 2020-02-03 PROCEDURE — 97161 PT EVAL LOW COMPLEX 20 MIN: CPT | Mod: GP | Performed by: PHYSICAL THERAPIST

## 2020-02-03 PROCEDURE — 97530 THERAPEUTIC ACTIVITIES: CPT | Mod: GP | Performed by: PHYSICAL THERAPIST

## 2020-02-03 ASSESSMENT — ACTIVITIES OF DAILY LIVING (ADL)
TOTAL_ADL_ITEM_SCORE:: 54
ACTIVITIES_OF_DAILY_LIVING_MEASURE_SCORE(%):: 64.29
HIGHEST_POTENTIAL_ADL_SCORE:: 84

## 2020-02-03 NOTE — PROGRESS NOTES
Physical Therapy Initial Examination/Evaluation  October 30, 2019    Kay Ceja is a 80 year old male referred to physical therapy by Dr. Jordi Henry for treatment of L foot plantar fasciitis with Precautions/Restrictions/MD instructions E&T    Therapist Impression:   Kay presents with findings consistent with the above diagnosis.  Our emphasis will be on foot intrinsic strengthening, stability with orthotics, and we will do a full body movement assessment next session to see if there are any other factors contributing to her symptoms.    Subjective:  Previous Treatment: Stretching, orthotics Effect of prior treatment: good  Chief Complaint/Functional Limitations:   Running, pain along the heeland see below in therapy evaluation codes   Sleeping: Interrupted due to current issue, very rarely  Current HEP/exercises: Running 15-20 miles week    Answers for HPI/ROS submitted by the patient on 2/2/2020   History Reported by Patient  Reason for Visit:: plantar fasciitis left foot  When problem began:: 12/3/2019  Where?: during recreation / sport  How problem occurred:: running  Number scale: 3/10  Pain quality: sharp  Frequency: intermittent  Pain is: worse during the day, worse in the A.M.  Progression since onset: unchanged  Special tests: x-ray  Previous treatment: other  Improvement with previous treatment: none  General health as reported by patient: good  Please check all that apply to your current or past medical history: history of fractures, numbness/tingling  Surgeries: other  Other Surgery Detail: neuroma removed R foot 1997; basal cell removal  Medications you are currently taking: none  Occupation:: psychotherapist  What are your primary job tasks: computer work, prolonged sitting, other  Other Tasks Detail: recreation: running, backpacking/hiking, cycling, strength training  Work restrictions: working in normal job without restrictions  Barriers at home/work: none as reported by patient    ANKLE  EVALUATION    Static Posture  Pes cavus B    Dynamic Movement Screen  Single leg stance observations: Eyes open no significant findings   Double limb squat observations: Knee valgus  Single limb squat observations: Knee valgus  Gait: Unstable push off B    Ankle Range of Motion  Ankle AROM Dorsiflexion Plantarflexion Inversion Eversion WBing DF (cm)   Left wnl wnl wnl wnl 11   Right wnl wnl wnl wnl 11   **WBing DF- distance between wall and great toe where pt can touch knee to wall and keep heel in contact with floor    Ankle Strength  Ankle MMT Dorsiflexion Plantarflexion Inversion Eversion   Left 5/5 na/5 5/5 5/5   Right 5/5 na/5 5/5 5/5     Provocation tests  Resisted (tedon) Left Right  PF/INV (post tib) Pain-free Pain-free  PF/EV (peroneals) Pain-free Pain-free  DF/INV (ant tib) Pain-free Pain-free  DF/EV (ext. dig) Pain-free Pain free  Overpressure/Stretch (tendon sheath)  Post tib (DV/EV) Pain-free Pain-free  Ant tib (PF/EV) Pain-free Pain-free  Peroneals (DF/INV) Pain-free Pain-free  Ext. Dig (PF/INV) Pain-free Pain free    Palpation  Left: Mild tenderness to palpation at medial heel  Right: Not assessed    Assessment/Plan:  Patient is a 49 year old female with left side ankle complaints.    Patient has the following significant findings with corresponding treatment plan.                Diagnosis 1:  L foot plantar fasciitis   Pain -  hot/cold therapy, manual therapy, splint/taping/bracing/orthotics, self management, education and home program  Inflammation - cold therapy, US, electric stimulation and self management/home program    Therapy Evaluation Codes:   1) History comprised of:   Personal factors that impact the plan of care:      None.    Comorbidity factors that impact the plan of care are:      None.     Medications impacting care: None.  2) Examination of Body Systems comprised of:   Body structures and functions that impact the plan of care:      Ankle.   Activity limitations that impact the plan  of care are:      Running, Sports and Walking.  3) Clinical presentation characteristics are:   Evolving/Changing.  4) Decision-Making    Low complexity using standardized patient assessment instrument and/or measureable assessment of functional outcome.  Cumulative Therapy Evaluation is: Low complexity.    Previous and current functional limitations:  (See Goal Flow Sheet for this information)    Short term and Long term goals: (See Goal Flow Sheet for this information)     Communication ability:  Patient appears to be able to clearly communicate and understand verbal and written communication and follow directions correctly.  Treatment Explanation - The following has been discussed with the patient:   RX ordered/plan of care  Anticipated outcomes  Possible risks and side effects  This patient would benefit from PT intervention to resume normal activities.   Rehab potential is good.    Frequency:  2 X a month, once daily  Duration:  for 3 months  Discharge Plan:  Achieve all LTG.  Independent in home treatment program.  Reach maximal therapeutic benefit.    Please refer to the daily flowsheet for treatment today, total treatment time and time spent performing 1:1 timed codes.

## 2020-03-02 ENCOUNTER — HEALTH MAINTENANCE LETTER (OUTPATIENT)
Age: 50
End: 2020-03-02

## 2020-03-16 PROBLEM — M72.2 PLANTAR FASCIITIS: Status: RESOLVED | Noted: 2020-02-03 | Resolved: 2020-03-16

## 2020-03-27 ENCOUNTER — DOCUMENTATION ONLY (OUTPATIENT)
Dept: CARE COORDINATION | Facility: CLINIC | Age: 50
End: 2020-03-27

## 2020-03-27 DIAGNOSIS — M85.80 OSTEOPENIA: Primary | ICD-10-CM

## 2020-06-05 ENCOUNTER — TELEPHONE (OUTPATIENT)
Dept: DERMATOLOGY | Facility: CLINIC | Age: 50
End: 2020-06-05

## 2020-06-05 NOTE — TELEPHONE ENCOUNTER
"Teledermatology Nurse Call for RETURN patients seen within the last 3 years:    The patient was contacted by phone and we reviewed, \"Due to the coronavirus pandemic, we are calling to review your visit and offer you a teledermatology visit where you send in photos via [employer]ullo. These photos will be seen by an MD or KAMINI. This will be billed to you and your insurance.\"  The patient was also told that \"a teledermatology visit is not as thorough as an in-person visit and that the quality of the photograph sent may not be of the same quality as that taken by the dermatology clinic, but the patient would like to proceed with an teledermatology because of National Emergency Regarding Coronavirus disease (COVID 19) Outbreak.\"  \"If a prescription is necessary we can send it directly to your pharmacy.  If lab work is needed we can place an order for that and you can then stop by our lab to have the test done at a later time.\"    The patient understood that they may receive a call from the clinic to review additional history, may still be instructed to come to clinic even after photo review and be billed for both visits with an MD. They were told that a photo assessment does not replace an in person skin exam. The patient understood that teledermatology is not for urgent issues and would require up to 3 business days for review. The patient denied skin pain, fever, mucosal symptoms (lesions, blisters, sores in the mouth, nose, eyes, or genitals)  IF PATIENT ENDORSES ANY OF THESE STOP AND PAGE  ON CALL ATTENDING. IF OTHER POSSIBLY URGENT SYMPTOMS THEN PAGE PHYSICIAN YOU ARE SCHEDULING WITH OR ON CALL IF NO ANSWER.       The patient chose to:                                                                                                                                                                                                                    The patient selected Jin. This video visit will be conducted via a call " between you and your physician/provider via [employer]. We have found that certain health care needs can be provided without the need for an in-person physical exam.  This service lets us provide the care you need with a video conversation. f during the course of the call the physician/provider feels a video visit is not appropriate, you will not be charged for this service. Patient would like the video invitation sent by: Send to e-mail at: [email or web address]    The patient is verified to be in the [facility] at their home address at the time of the encounter.                                                                                                                                                                                                              Patient concerns for this return visit: Growth above eyebrow    Photo instructions reviewed with patients as below:  -ALL patient needs to send photos unless they have a phone only visit approved by the clinic:  o To send photos to your doctor, respond to the message in Ornicept as many times as needed to upload your photos. Each message allows for 3 photos.  o For spots or lesions of concern, please take at least 2 photos of each site you are worried about (at different angles if needed, and at least one close up and one farther away so we can tell where it is on the body. Be sure all photographs are in focus)  o For rashes, take photos of the entire body because it is important for us to see which areas are involved and which areas are not involved.  At a minimum, please include photos of the arms, legs, front of trunk, back of trunk, face, and a few close-ups of the rash.  Leave undergarments in place unless the rash involves the skin in these areas  o For acne, please take photos of the face, upper chest and neck, and upper chest and back  o For hair loss, please send views of the top of the head, sides of the head, back of the head and a picture of  your face with your hair pulled back. Also, a photos of both hands with nails.    -For ADULT NEW patients video visits are needed, to receive an invitation and connect with your provider, the Dress Code video visit technology must be accessible from your smartphone or personal device. Please click the link below for setup instructions: [email or web address]    Nursing tasks completed  -Pharmacy preference was updated.  -The nurse has dropped in the AVS information *(For adults the phrase is umdermhteleavs and for pediatrics it is their own) for the physician to route in the AVS.                                                                                                                                                                                                                         -The patient was told to contact the clinic if they have not received correspondence within 72 hours.

## 2020-06-15 ENCOUNTER — VIRTUAL VISIT (OUTPATIENT)
Dept: DERMATOLOGY | Facility: CLINIC | Age: 50
End: 2020-06-15
Payer: COMMERCIAL

## 2020-06-15 DIAGNOSIS — L98.9 BENIGN SKIN LESION OF FACE: Primary | ICD-10-CM

## 2020-06-15 DIAGNOSIS — L81.4 LENTIGINES: ICD-10-CM

## 2020-06-15 ASSESSMENT — PAIN SCALES - GENERAL: PAINLEVEL: NO PAIN (0)

## 2020-06-15 NOTE — PATIENT INSTRUCTIONS
"[facility] Teledermatology Visit    Thank you for allowing us to participate in your care. Your findings, instructions and follow-up plan are as follows:    1. Left medial eyebrow, appears to be a benign SK or possibly verrucous in nature, ok to hold off on evaluation for now during COVID pandemic  -could consider \"freezing\" in the future if symptomatic  -will plan to evaluate in person within the next few months once we are able to see patients in clinic    2. Lentigines (sun spots) - sun exposed areas, mostly face, upper back and shoulders and forearms  -Sunscreen: Apply 20 minutes prior to going outdoors and reapply every two hours, when wet or sweating. We recommend using an SPF 30 or higher, and to use one that is water resistant.     -Discussed treatment options: hydroquinone, tretinoin, laser treatmet with cosmetic derm  -patient to consider laser in the fall or when we are able to see patients in person  -I did tell her she could try hydroquinone 4% cream BID for 8-12 weeks, she may do this in the future. It is ok to send her an Rx for this within the next year if she decides to go this route.    When should I call my doctor?    If you are worsening or not improving, please, contact us or seek urgent care as noted below.     Who should I call with questions (adults)?    [facility] (adult and pediatric): [phone]     [facility] (adult): [phone]    For urgent needs outside of business hours call the [facility] at [phone] and ask for the dermatology resident on call    If this is a medical emergency and you are unable to reach an ER, Call [phone]      Who should I call with questions (pediatric)?  [facility]- Pediatric Dermatology  Dr. Kristal Nicholas, Dr. Chris Genao, Dr. Marietta Alvarez, DANIEL Joe Dr., Dr. aKt Gómez & Dr. Andre Dickinson  Non Urgent  Nurse " Triage Line; [phone]- Bindu and Alee RN Care Coordinators   Maribel (/Complex ) [phone]    If you need a prescription refill, please contact your pharmacy. Refills are approved or denied by our Physicians during normal business hours, Monday through Fridays  Per office policy, refills will not be granted if you have not been seen within the past year (or sooner depending on your child's condition)    Scheduling Information:  Pediatric Appointment Scheduling and Call Center [phone]  Radiology Scheduling- [phone]  Sedation Unit Scheduling- [phone]  [place] Scheduling- General [phone]; Pediatric Dermatology [phone]  Main  Services: [phone]  Divehi: [phone]  Colombian: [phone]  Hmong/Andorran/Kenney: [phone]  Preadmission Nursing Department Fax Number: [phone] (Fax all pre-operative paperwork to this number)    For urgent matters arising during evenings, weekends, or holidays that cannot wait for normal business hours please call [phone] and ask for the Dermatology Resident On-Call to be paged.

## 2020-06-15 NOTE — PROGRESS NOTES
[facility]  Store and Forward and Video ( Invitation sent by:  Life[employer] and text to cell phone ) [phone]      Impression and Recommendations (Patient Counseled on the Following):  1. Left medial eyebrow, appears to be a benign SK or possibly verrucous in nature, ok to hold off on evaluation for now during COVID pandemic  -could consider LN2 in the future if symptomatic  -will plan to evaluate in person within the next few months once we are able to see patients in person    2. Lentigines - sun exposed areas, mostly face, upper back and shoulders and forearms  -edu on etiology  -Sunscreen: Apply 20 minutes prior to going outdoors and reapply every two hours, when wet or sweating. We recommend using an SPF 30 or higher, and to use one that is water resistant.     -Discussed treatment options: hydroquinone, tretinoin, laser with cosmetic derm  -patient to consider laser in the fall or when we are able to see patients in person  -I did tell her she could try hydroquinone 4% cream BID for 8-12 weeks, she may do this in the future. It is ok to send her an Rx for this within the next year if she decides to go this route. She specifically wanted this documented in her chart.     Follow-up:   Follow-up with dermatology in approximately 6mo. Earlier for new or changing lesions or rash.   CC Dr. Hancock on close of this encounter.     Staff only:    All risks, benefits and alternatives were discussed with patient.  Patient is in agreement and understands the assessment and plan.  All questions were answered.    Hortensia Ware PA-C, MPAS  [facility]: Phone: [phone], Fax: [phone]  [facility]: Phone: [phone],  Fax: [phone]  _____________________________________________________________________________    Dermatology Problem List:  1. History of NMSC:              - BCC, R superior forehead s/p  MMS 10/2014              - BCC, R omari, s/p MMS in [place] in 2012  2. Multiple (>100) atypical nevi on trunk and extremities. Prior bxs with moderate dysplasia.              - will refer for Dr. Belle mole mapping  3. AKs, states no reaction (per patient), but also documented as improved after field therapy with 5-FU 1-2x/week  4. Flat warts, dorsal PIPs. Cryo 3/14/2016.  5. Acne. Benzaclin.  6. Sebaceous hyperplasia. Tretinoin 0.025% cream.  7. 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 Resolved with TMC 0.1% cream  8. Seborrheic Keratosis    Encounter Date: Alejandro 15, 2020    CC:   Chief Complaint   Patient presents with     Derm Problem     Kay is wanting her left eyebrow looked at.       History of Present Illness:  I have reviewed the teledermatology information and the nursing intake corresponding to this issue. Kay Ceja is a 49 year old female who presents via teledermatology for a few spots of concern. First, she notes a spot on her left medial eyebrow which has been present for 6mo, and it does not seem to be going away. Not itchy. No bleeding. Has not been treated, but she has put tretinoin on the skin. She does have a hx of NMSC in the past and has once been treated for AKs with Efudex on the face but with no reaction. She was supposed to have mole mapping done with Dr. Belle, however due to pandemic thi appointment has been rescheduled for the future. Additionally, she notes what she thinks are age spots on the forehead. She notes them on the back and arms as well. She has noticed more of them recently, and would like to know what treatment options are available. She notes a rash on her chest, but says this went away quickly following use of tretinoin and it is now resolved. She is otherwise doing well and has no other concerns.    ROS: Patient is generally feeling well today   -Constitutional: no unintended weight loss/gain, no night sweats or fevers  -Skin:  as per HPI    Physical Examination:  General: Well-appearing, appropriately-developed individual.  Skin: Focused examination within the teledermatology photograph(s) including head, face, neck, and chest was performed.   -left medial eyebrow - flesh colored to slightly brown papule with thin scaling, about 6mm in size  -scattered brown well demarcated macules on the forehead  -chest with scattered erythematous papules    Past Medical History:   Patient Active Problem List   Diagnosis     Anxiety     Amenorrhea     Osteopenia     Senile sebaceous gland hyperplasia     Atypical nevi     Basal cell carcinoma of right forehead s/p mms 10-13-14     Pain in joint, pelvic region and thigh     Pain in right ankle     Peroneal tendinitis     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    MRI otherwise normal     Insomnia      Osteopenia     T score -1.9 hip and back 1/19/11     Vasomotor rhinitis     uses nasalcort     Past Surgical History:   Procedure Laterality Date     basal cell ca excision       MOHS MICROGRAPHIC PROCEDURE       Social History:  Patient reports that she quit smoking about 20 years ago. Her smoking use included cigarettes. She has a 1.00 pack-year smoking history. She has never used smokeless tobacco. She reports that she does not drink alcohol or use drugs.    Family History:  Family History   Problem Relation Age of Onset     C.A.D. Maternal Grandmother      C.A.D. Maternal Grandfather      Breast Cancer Mother      Bipolar Disorder Father         suicide at age 42     Depression Brother         Suicide     Cancer No family hx of         skin cancer     Melanoma No family hx of      Skin Cancer No family hx of      Medications:  Current Outpatient Medications   Medication     Calcium Carbonate-Vit D-Min (CALCIUM 1200 PO)     Multiple Vitamins-Minerals  (MULTIVITAMIN & MINERAL PO)     tretinoin (RETIN-A) 0.025 % external cream     clonazePAM (KLONOPIN) 0.5 MG tablet     estradiol (VAGIFEM) 10 MCG TABS vaginal tablet     fish oil-omega-3 fatty acids (FISH OIL) 1000 MG capsule     omega-3 acid ethyl esters (LOVAZA) 1 G capsule     No current facility-administered medications for this visit.      No Known Allergies  _________________________________________________________________________    Teledermatology information:  - Location of patient in [place]: Home  - Patient presented as: return  - Location of teledermatologist:  ([facility] )  - Reason teledermatology is appropriate:  of National Emergency Regarding Coronavirus disease (COVID 19) Outbreak  - Image quality and interpretability: acceptable  - Physician has received verbal consent for a Video/Photos Visit from the patient? Yes  - In-person dermatology visit recommendation: no  - Date of images: 6/12/20  - Length of call: 15min  - Date of report: 6/15/2020

## 2020-06-15 NOTE — LETTER
6/15/2020       RE: Kay Ceja  [address]  [facility] [address]     Dear Colleague,    Thank you for referring your patient, Kay Ceja, to the [facility] at [facility]. Please see a copy of my visit note below.    [facility]  Store and Forward and Video ( Invitation sent by:  TextPayMe and text to cell phone ) [phone]      Impression and Recommendations (Patient Counseled on the Following):  1. Left medial eyebrow, appears to be a benign SK or possibly verrucous in nature, ok to hold off on evaluation for now during COVID pandemic  -could consider LN2 in the future if symptomatic  -will plan to evaluate in person within the next few months once we are able to see patients in person    2. Lentigines - sun exposed areas, mostly face, upper back and shoulders and forearms  -edu on etiology  -Sunscreen: Apply 20 minutes prior to going outdoors and reapply every two hours, when wet or sweating. We recommend using an SPF 30 or higher, and to use one that is water resistant.     -Discussed treatment options: hydroquinone, tretinoin, laser with cosmetic derm  -patient to consider laser in the fall or when we are able to see patients in person  -I did tell her she could try hydroquinone 4% cream BID for 8-12 weeks, she may do this in the future. It is ok to send her an Rx for this within the next year if she decides to go this route. She specifically wanted this documented in her chart.     Follow-up:   Follow-up with dermatology in approximately 6mo. Earlier for new or changing lesions or rash.   CC Dr. Hancock on close of this encounter.     Staff only:    All risks, benefits and alternatives were discussed with patient.  Patient is in agreement and understands the assessment and plan.  All questions were answered.    Hortensia Ware PA-C, MPAS  [facility]: Phone:  [phone], Fax: [phone]  [facility]: Phone: [phone],  Fax: [phone]  _____________________________________________________________________________    Dermatology Problem List:  1. History of NMSC:              - BCC, R superior forehead s/p MMS 10/2014              - BCC, R Anabaptism, s/p MMS in [place] in 2012  2. Multiple (>100) atypical nevi on trunk and extremities. Prior bxs with moderate dysplasia.              - will refer for Dr. Belle mole mapping  3. AKs, states no reaction (per patient), but also documented as improved after field therapy with 5-FU 1-2x/week  4. Flat warts, dorsal PIPs. Cryo 3/14/2016.  5. Acne. Benzaclin.  6. Sebaceous hyperplasia. Tretinoin 0.025% cream.  7. 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 Resolved with TMC 0.1% cream  8. Seborrheic Keratosis    Encounter Date: Alejandro 15, 2020    CC:   Chief Complaint   Patient presents with     Derm Problem     Kay is wanting her left eyebrow looked at.       History of Present Illness:  I have reviewed the teledermatology information and the nursing intake corresponding to this issue. Kay Ceja is a 49 year old female who presents via teledermatology for a few spots of concern. First, she notes a spot on her left medial eyebrow which has been present for 6mo, and it does not seem to be going away. Not itchy. No bleeding. Has not been treated, but she has put tretinoin on the skin. She does have a hx of NMSC in the past and has once been treated for AKs with Efudex on the face but with no reaction. She was supposed to have mole mapping done with Dr. Belle, however due to pandemic thi appointment has been rescheduled for the future. Additionally, she notes what she thinks are age spots on the forehead. She notes them on the back and arms as well. She has noticed more of them recently, and would like to know what treatment options are available. She notes a rash on her  chest, but says this went away quickly following use of tretinoin and it is now resolved. She is otherwise doing well and has no other concerns.    ROS: Patient is generally feeling well today   -Constitutional: no unintended weight loss/gain, no night sweats or fevers  -Skin: as per HPI    Physical Examination:  General: Well-appearing, appropriately-developed individual.  Skin: Focused examination within the teledermatology photograph(s) including head, face, neck, and chest was performed.   -left medial eyebrow - flesh colored to slightly brown papule with thin scaling, about 6mm in size  -scattered brown well demarcated macules on the forehead  -chest with scattered erythematous papules    Past Medical History:   Patient Active Problem List   Diagnosis     Anxiety     Amenorrhea     Osteopenia     Senile sebaceous gland hyperplasia     Atypical nevi     Basal cell carcinoma of right forehead s/p mms 10-13-14     Pain in joint, pelvic region and thigh     Pain in right ankle     Peroneal tendinitis     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    MRI otherwise normal     Insomnia      Osteopenia     T score -1.9 hip and back 1/19/11     Vasomotor rhinitis     uses nasalcort     Past Surgical History:   Procedure Laterality Date     basal cell ca excision       MOHS MICROGRAPHIC PROCEDURE       Social History:  Patient reports that she quit smoking about 20 years ago. Her smoking use included cigarettes. She has a 1.00 pack-year smoking history. She has never used smokeless tobacco. She reports that she does not drink alcohol or use drugs.    Family History:  Family History   Problem Relation Age of Onset     EVELIN Maternal Grandmother      SAIMA. Maternal Grandfather      Breast Cancer Mother      Bipolar Disorder Father         suicide at age 42     Depression  Brother         Suicide     Cancer No family hx of         skin cancer     Melanoma No family hx of      Skin Cancer No family hx of      Medications:  Current Outpatient Medications   Medication     Calcium Carbonate-Vit D-Min (CALCIUM 1200 PO)     Multiple Vitamins-Minerals (MULTIVITAMIN & MINERAL PO)     tretinoin (RETIN-A) 0.025 % external cream     clonazePAM (KLONOPIN) 0.5 MG tablet     estradiol (VAGIFEM) 10 MCG TABS vaginal tablet     fish oil-omega-3 fatty acids (FISH OIL) 1000 MG capsule     omega-3 acid ethyl esters (LOVAZA) 1 G capsule     No current facility-administered medications for this visit.      No Known Allergies  _________________________________________________________________________    Teledermatology information:  - Location of patient in [place]: Home  - Patient presented as: return  - Location of teledermatologist:  [facility]ER[facility] )  - Reason teledermatology is appropriate:  of National Emergency Regarding Coronavirus disease (COVID 19) Outbreak  - Image quality and interpretability: acceptable  - Physician has received verbal consent for a Video/Photos Visit from the patient? Yes  - In-person dermatology visit recommendation: no  - Date of images: 6/12/20  - Length of call: 15min  - Date of report: 6/15/2020     Again, thank you for allowing me to participate in the care of your patient.      Sincerely,    Hortensia Ware PA-C

## 2020-06-15 NOTE — NURSING NOTE
Dermatology Rooming Note    Kay Ceja's goals for this visit include:   Chief Complaint   Patient presents with     Derm Problem     Kay is wanting her left eyebrow looked at.     JOSY Sheets

## 2020-06-17 ENCOUNTER — THERAPY VISIT (OUTPATIENT)
Dept: PHYSICAL THERAPY | Facility: CLINIC | Age: 50
End: 2020-06-17
Payer: COMMERCIAL

## 2020-06-17 DIAGNOSIS — M76.72 PERONEAL TENDINITIS OF LEFT LOWER EXTREMITY: Primary | ICD-10-CM

## 2020-06-17 DIAGNOSIS — M25.572 PAIN IN JOINT, ANKLE AND FOOT, LEFT: ICD-10-CM

## 2020-06-17 PROCEDURE — 97110 THERAPEUTIC EXERCISES: CPT | Mod: GP | Performed by: PHYSICAL THERAPIST

## 2020-06-17 PROCEDURE — 97530 THERAPEUTIC ACTIVITIES: CPT | Mod: GP | Performed by: PHYSICAL THERAPIST

## 2020-06-17 NOTE — PROGRESS NOTES
PROGRESS REPORT    Kay has been in therapy from October 30, 2019 to Jun 17, 2020 for treatment of L plantar fascitis/ankle pain .    Therapist Impression:  Kay presents with findings that seem to be peroneal tendinopathy.  The onset of symptoms are strange and there is also swelling that resembles a high ankle sprain that is unique.  I showed her some tape techniques and she will follow up with Dr. Henry upon her return from [facility] if needed.    Subjective:  Not running much over winter.  Did some taping and started running 4-5 miles, 5-6 x week.  Not sure why but flared up 1 week ago.  Answers for HPI/ROS submitted by the patient on 6/17/2020   History Reported by Patient  Reason for Visit:: Left ankle pain  When problem began:: 6/9/2020  Number scale: 4/10  General health as reported by patient: good  Please check all that apply to your current or past medical history: none  Medical allergies: none  Surgeries: none  Medications you are currently taking: none  Occupation:: psycotherapist  What are your primary job tasks: computer work, prolonged sitting, prolonged standing      Objective:  ANKLE EVALUATION  Double limb squat observations: Good technique/no significant findings  Single limb squat observations: Not assessed  Gait: pain with push off    Ankle Range of Motion  Ankle AROM Dorsiflexion Plantarflexion Inversion Eversion WBing DF (cm)   Left wnl wnl wnl wnl 10   Right wnl wnl wnl wnl 10   **WBing DF- distance between wall and great toe where pt can touch knee to wall and keep heel in contact with floor    Ankle Strength  Ankle MMT Dorsiflexion Plantarflexion Inversion Eversion   Left 5/5 na/5 5/5 5/5   Right 5/5 na/5 5/5 5/5     Provocation tests  Resisted (tedon) Left Right  PF/INV (post tib) Pain-free Pain-free  PF/EV (peroneals) Pain-free Pain-free  DF/INV (ant tib) Pain-free Pain-free  DF/EV (ext. dig) Pain-free Pain free  Overpressure/Stretch (tendon sheath)  Post tib  (DV/EV) Pain-free Pain-free  Ant tib (PF/EV) Pain-free Pain-free  Peroneals (DF/INV) Pain Pain-free  Ext. Dig (PF/INV) Pain-free Pain free    Special Tests:  Ligament testing: negative    Palpation  Left: Mild tenderness to palpation at peroneal tendon  Right: Not assessed    ASSESSMENT/PLAN  Updated problem list and treatment plan: There were no encounter diagnoses. Pain - HEP  Edema - HEP  STG/LTGs have been met or progress has been made towards goals:  None  Assessment of Progress: The patient's condition has exacerbated.  Self Management Plans:  Patient has been instructed in a home treatment program.  Patient  has been instructed in self management of symptoms.  I have re-evaluated this patient and find that the nature, scope, duration and intensity of the therapy is appropriate for the medical condition of the patient.  Kay continues to require the following intervention to meet STG and LTG's:  PT    Recommendations:  This patient would benefit from continued therapy.     Frequency:  1 X a month, once daily  Duration:  for 2 months      This patient would benefit from further evaluation.          Please refer to the daily flowsheet for treatment today, total treatment time and time spent performing 1:1 timed codes.

## 2020-06-22 ENCOUNTER — MYC MEDICAL ADVICE (OUTPATIENT)
Dept: OBGYN | Facility: CLINIC | Age: 50
End: 2020-06-22

## 2020-06-22 DIAGNOSIS — Z78.0 MENOPAUSE: Primary | ICD-10-CM

## 2020-06-24 ENCOUNTER — TELEPHONE (OUTPATIENT)
Dept: OBGYN | Facility: CLINIC | Age: 50
End: 2020-06-24

## 2020-06-24 NOTE — TELEPHONE ENCOUNTER
Received message on triage voicemail from Kay who asked several questions regarding upcoming appointment and testing.     Tried to call her but voicemail box was full so could not leave a message.    Sent [employer] answering her questions.

## 2020-06-26 ENCOUNTER — MEDICAL CORRESPONDENCE (OUTPATIENT)
Dept: HEALTH INFORMATION MANAGEMENT | Facility: CLINIC | Age: 50
End: 2020-06-26

## 2020-06-29 DIAGNOSIS — M79.673 FOOT PAIN: Primary | ICD-10-CM

## 2020-06-29 LAB
BASOPHILS # BLD AUTO: 0 10E9/L (ref 0–0.2)
BASOPHILS NFR BLD AUTO: 0.7 %
DIFFERENTIAL METHOD BLD: NORMAL
EOSINOPHIL # BLD AUTO: 0.1 10E9/L (ref 0–0.7)
EOSINOPHIL NFR BLD AUTO: 2.6 %
ERYTHROCYTE [DISTWIDTH] IN BLOOD BY AUTOMATED COUNT: 12.9 % (ref 10–15)
ERYTHROCYTE [SEDIMENTATION RATE] IN BLOOD BY WESTERGREN METHOD: 9 MM/H (ref 0–30)
HCT VFR BLD AUTO: 39.2 % (ref 35–47)
HGB BLD-MCNC: 12.8 G/DL (ref 11.7–15.7)
IMM GRANULOCYTES # BLD: 0 10E9/L (ref 0–0.4)
IMM GRANULOCYTES NFR BLD: 0.2 %
LYMPHOCYTES # BLD AUTO: 1.2 10E9/L (ref 0.8–5.3)
LYMPHOCYTES NFR BLD AUTO: 26.7 %
MCH RBC QN AUTO: 31.2 PG (ref 26.5–33)
MCHC RBC AUTO-ENTMCNC: 32.7 G/DL (ref 31.5–36.5)
MCV RBC AUTO: 96 FL (ref 78–100)
MONOCYTES # BLD AUTO: 0.3 10E9/L (ref 0–1.3)
MONOCYTES NFR BLD AUTO: 6 %
NEUTROPHILS # BLD AUTO: 2.9 10E9/L (ref 1.6–8.3)
NEUTROPHILS NFR BLD AUTO: 63.8 %
NRBC # BLD AUTO: 0 10*3/UL
NRBC BLD AUTO-RTO: 0 /100
PLATELET # BLD AUTO: 256 10E9/L (ref 150–450)
RBC # BLD AUTO: 4.1 10E12/L (ref 3.8–5.2)
RHEUMATOID FACT SER NEPH-ACNC: <7 IU/ML (ref 0–20)
URATE SERPL-MCNC: 3.6 MG/DL (ref 2.6–6)
WBC # BLD AUTO: 4.5 10E9/L (ref 4–11)

## 2020-06-29 ASSESSMENT — ENCOUNTER SYMPTOMS
NUMBNESS: 0
BREAST PAIN: 1
SEIZURES: 0
JOINT SWELLING: 1
TREMORS: 0
DISTURBANCES IN COORDINATION: 0
PARALYSIS: 0
STIFFNESS: 1
HEADACHES: 1
DIZZINESS: 0
MUSCLE WEAKNESS: 0
LOSS OF CONSCIOUSNESS: 0
NECK PAIN: 0
TINGLING: 0
SPEECH CHANGE: 0
WEAKNESS: 0
MYALGIAS: 0
BREAST MASS: 0
MUSCLE CRAMPS: 0
ARTHRALGIAS: 1
BACK PAIN: 0
MEMORY LOSS: 0

## 2020-06-29 ASSESSMENT — ANXIETY QUESTIONNAIRES
7. FEELING AFRAID AS IF SOMETHING AWFUL MIGHT HAPPEN: NOT AT ALL
GAD7 TOTAL SCORE: 0
5. BEING SO RESTLESS THAT IT IS HARD TO SIT STILL: NOT AT ALL
7. FEELING AFRAID AS IF SOMETHING AWFUL MIGHT HAPPEN: NOT AT ALL
GAD7 TOTAL SCORE: 0
2. NOT BEING ABLE TO STOP OR CONTROL WORRYING: NOT AT ALL
4. TROUBLE RELAXING: NOT AT ALL
1. FEELING NERVOUS, ANXIOUS, OR ON EDGE: NOT AT ALL
3. WORRYING TOO MUCH ABOUT DIFFERENT THINGS: NOT AT ALL
6. BECOMING EASILY ANNOYED OR IRRITABLE: NOT AT ALL

## 2020-06-30 LAB
B LOCUS: NORMAL
B27TEST METHOD: NORMAL

## 2020-06-30 ASSESSMENT — ANXIETY QUESTIONNAIRES: GAD7 TOTAL SCORE: 0

## 2020-07-02 ENCOUNTER — ANCILLARY PROCEDURE (OUTPATIENT)
Dept: BONE DENSITY | Facility: CLINIC | Age: 50
End: 2020-07-02
Attending: OBSTETRICS & GYNECOLOGY
Payer: COMMERCIAL

## 2020-07-02 ENCOUNTER — OFFICE VISIT (OUTPATIENT)
Dept: OBGYN | Facility: CLINIC | Age: 50
End: 2020-07-02
Attending: OBSTETRICS & GYNECOLOGY
Payer: COMMERCIAL

## 2020-07-02 ENCOUNTER — ANCILLARY PROCEDURE (OUTPATIENT)
Dept: MAMMOGRAPHY | Facility: CLINIC | Age: 50
End: 2020-07-02
Attending: OBSTETRICS & GYNECOLOGY
Payer: COMMERCIAL

## 2020-07-02 VITALS
HEART RATE: 57 BPM | BODY MASS INDEX: 18.39 KG/M2 | SYSTOLIC BLOOD PRESSURE: 104 MMHG | DIASTOLIC BLOOD PRESSURE: 68 MMHG | HEIGHT: 64 IN | WEIGHT: 107.7 LBS

## 2020-07-02 DIAGNOSIS — N93.9 ABNORMAL UTERINE BLEEDING (AUB): Primary | ICD-10-CM

## 2020-07-02 DIAGNOSIS — Z12.31 VISIT FOR SCREENING MAMMOGRAM: ICD-10-CM

## 2020-07-02 DIAGNOSIS — Z01.419 ENCOUNTER FOR GYNECOLOGICAL EXAMINATION WITHOUT ABNORMAL FINDING: ICD-10-CM

## 2020-07-02 DIAGNOSIS — M85.80 OSTEOPENIA: ICD-10-CM

## 2020-07-02 DIAGNOSIS — M25.579 PAIN IN JOINT, ANKLE AND FOOT, UNSPECIFIED LATERALITY: ICD-10-CM

## 2020-07-02 PROCEDURE — G0463 HOSPITAL OUTPT CLINIC VISIT: HCPCS | Mod: 25,ZF

## 2020-07-02 PROCEDURE — 77067 SCR MAMMO BI INCL CAD: CPT

## 2020-07-02 PROCEDURE — 77080 DXA BONE DENSITY AXIAL: CPT

## 2020-07-02 ASSESSMENT — PAIN SCALES - GENERAL: PAINLEVEL: MODERATE PAIN (4)

## 2020-07-02 ASSESSMENT — MIFFLIN-ST. JEOR: SCORE: 1093.52

## 2020-07-02 NOTE — LETTER
"7/2/2020       RE: Kay Ceja  [address]  [facility] [address]     Dear Colleague,    Thank you for referring your patient, Kay Ceja, to the [facility] at [facility]. Please see a copy of my visit note below.      Progress Note    SUBJECTIVE:  Kay Ceja is an 49 year old  No obstetric history on file., who requests a breast and pelvic exam.    Patient is followed by [facility] for primary care.    Concerns today include: She feels \"more hormonal\". She had been diagnosed with hypothalamic secondary amenorrhea since 2004.  In the last several months she has gained a little weight and had started to have some light menses.  She had 4 days of light bleeding twice in 4 months.  She feels her breasts are cardenas as well, no masses or pain, but noted a change in how her bras fit.  She denies any changes to bladder or bowel habits.  She denies bloating.    She has also had more ankle pain and went to see a podiatrist who was concerned about possible arthritis.  She has a history of + REI and has seen rheumatology in the past.      Menstrual History:  Menstrual History 3/16/2012 8/25/2016 8/25/2016   LAST MENSTRUAL PERIOD 12/16/2004 5/24/2016 -   Menarche Age - - 13   Period Cycle (Days) - - no period for 12 years then had one 5-   Period Duration (Days) - - 5   Period Pattern - - Irregular   Menstrual Flow - - Light   Dysmenorrhea - - Mild   PMS Symptoms - - Cramping   Reviewed Today - - Yes       Last    Lab Results   Component Value Date    PAP NIL 01/18/2018     History of abnormal Pap smear: maybe once in her 20s, no treatment or follow-up required.    Last   Lab Results   Component Value Date    HPV16 Negative 01/18/2018     Last   Lab Results   Component Value Date    HPV18 Negative 01/18/2018     Last   Lab Results   Component Value Date    HRHPV Negative 01/18/2018       Mammogram current: done today, ok for screening " mammogram.    HISTORY:  Prescription Medications as of 7/2/2020       Rx Number Disp Refills Start End Last Dispensed Date Next Fill Date Owning Pharmacy    Calcium Carbonate-Vit D-Min (CALCIUM 1200 PO)            Sig: Take 1 tablet by mouth daily Reported on 3/23/2017    Class: Historical    Route: Oral    Multiple Vitamins-Minerals (MULTIVITAMIN & MINERAL PO)            Sig: Take  by mouth.    Class: Historical    Route: Oral    tretinoin (RETIN-A) 0.025 % external cream  45 g 11 9/5/2019    [facility] - [place], [place] - [address] [facility]    Sig: Apply a pea-size amount to entire face nightly at bedtime.    Class: E-Prescribe        No Known Allergies  Immunization History   Administered Date(s) Administered     Influenza (IIV3) PF 11/20/2012, 10/27/2013       OB History   No obstetric history on file.     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 Maternal Grandmother      CChiragA.D. Maternal Grandfather      Breast Cancer Mother      Osteoporosis Mother      Bipolar Disorder Father         suicide at age 42     Depression Brother         Suicide     Cancer No family hx of         skin cancer     Melanoma No family hx of      Skin Cancer No family hx of      Social History     Socioeconomic History     Marital status:      Spouse name: None     Number of children: None     Years of education: None     Highest education level: None   Occupational History     None   Social Needs      Financial resource strain: None     Food insecurity     Worry: None     Inability: None     Transportation needs     Medical: None     Non-medical: None   Tobacco Use     Smoking status: Former Smoker     Packs/day: 0.20     Years: 5.00     Pack years: 1.00     Types: Cigarettes     Last attempt to quit: 1999     Years since quittin.8     Smokeless tobacco: Never Used   Substance and Sexual Activity     Alcohol use: No     Drug use: No     Sexual activity: Not Currently     Partners: Male     Birth control/protection: None   Lifestyle     Physical activity     Days per week: None     Minutes per session: None     Stress: None   Relationships     Social connections     Talks on phone: None     Gets together: None     Attends Yazdanism service: None     Active member of club or organization: None     Attends meetings of clubs or organizations: None     Relationship status: None     Intimate partner violence     Fear of current or ex partner: None     Emotionally abused: None     Physically abused: None     Forced sexual activity: None   Other Topics Concern      Service Not Asked     Blood Transfusions Not Asked     Caffeine Concern Not Asked     Occupational Exposure Not Asked     Hobby Hazards Not Asked     Sleep Concern Not Asked     Stress Concern Not Asked     Weight Concern Not Asked     Special Diet Not Asked     Back Care Not Asked     Exercise Yes     Comment: runner, yoga, strength trains     Bike Helmet Not Asked     Seat Belt Not Asked     Self-Exams Not Asked     Parent/sibling w/ CABG, MI or angioplasty before 65F 55M? Not Asked   Social History Narrative    How much exercise per week? daily    How much calcium per day? supplements       How much caffeine per day? 2 tea    How much vitamin D per day? In supplements    Do you/your family wear seatbelts?  Yes    Do you/your family use safety helmets? Yes    Do you/your family use sunscreen? Yes    Do you/your family keep firearms in the  "home? No    Do you/your family have a smoke detector(s)? Yes        Do you feel safe in your home? Yes    Has anyone ever touched you in an unwanted manner? No     Explain reviewed [facility] 8-       ROS    EXAM:  Blood pressure 104/68, pulse 57, height 1.626 m (5' 4\"), weight 48.9 kg (107 lb 11.2 oz), not currently breastfeeding. Body mass index is 18.49 kg/m .  General appearance: Pleasant female in no acute distress.     BREAST EXAM:  Breast: Without visible skin changes. No dimpling or lesions seen.   Breasts supple, non-tender with palpation, no dominant mass, nodularity, or nipple discharge noted bilaterally. Axillary nodes negative.      PELVIC EXAM:  EG/BUS: Normal genital architecture without lesions, erythema or abnormal secretions Bartholin's, Urethra, [place]'s normal   Urethral meatus: normal    Urethra: no masses, tenderness, or scarring    Bladder: no masses or tenderness    Vagina: moist, pink, rugae with thin bloody secretions  Cervix: Nulliparous, and no lesions  Uterus: anteverted,  and small, smooth, firm, mobile w/o pain  Adnexa: Within normal limits and No masses, nodularity, tenderness  Rectum:anus normal       ASSESSMENT:  Encounter Diagnoses   Name Primary?     Encounter for gynecological examination without abnormal finding      Abnormal uterine bleeding (AUB) Yes     Pain in joint, ankle and foot, unspecified laterality       49 year old Female Pelvic and Breast Exam  Abnormal uterine bleeding in the setting of a history of hypothalamic secondary amenorrhea    PLAN:   Orders Placed This Encounter   Procedures     Pelvic and Breast Exam Procedure []     US Pelvic Complete with Transvaginal     RHEUMATOLOGY REFERRAL     Discussed that if ultrasound shows thickened lining will recommend EMB.  Previously ordered FSH and estradiol.    Mammogram and DEXA today.  Return in one year/PRN for preventive care or problems/concerns.     Verbalized understanding and agreement with visit plan.  Kay" Anne Buck MD

## 2020-07-02 NOTE — PROGRESS NOTES
"  Progress Note    SUBJECTIVE:  Kay Ceja is an 49 year old  No obstetric history on file., who requests a breast and pelvic exam.    Patient is followed by [facility] for primary care.    Concerns today include: She feels \"more hormonal\". She had been diagnosed with hypothalamic secondary amenorrhea since 2004.  In the last several months she has gained a little weight and had started to have some light menses.  She had 4 days of light bleeding twice in 4 months.  She feels her breasts are cardenas as well, no masses or pain, but noted a change in how her bras fit.  She denies any changes to bladder or bowel habits.  She denies bloating.    She has also had more ankle pain and went to see a podiatrist who was concerned about possible arthritis.  She has a history of + REI and has seen rheumatology in the past.      Menstrual History:  Menstrual History 3/16/2012 8/25/2016 8/25/2016   LAST MENSTRUAL PERIOD 12/16/2004 5/24/2016 -   Menarche Age - - 13   Period Cycle (Days) - - no period for 12 years then had one 5-   Period Duration (Days) - - 5   Period Pattern - - Irregular   Menstrual Flow - - Light   Dysmenorrhea - - Mild   PMS Symptoms - - Cramping   Reviewed Today - - Yes       Last    Lab Results   Component Value Date    PAP NIL 01/18/2018     History of abnormal Pap smear: maybe once in her 20s, no treatment or follow-up required.    Last   Lab Results   Component Value Date    HPV16 Negative 01/18/2018     Last   Lab Results   Component Value Date    HPV18 Negative 01/18/2018     Last   Lab Results   Component Value Date    HRHPV Negative 01/18/2018       Mammogram current: done today, ok for screening mammogram.    HISTORY:  Prescription Medications as of 7/2/2020       Rx Number Disp Refills Start End Last Dispensed Date Next Fill Date Owning Pharmacy    Calcium Carbonate-Vit D-Min (CALCIUM 1200 PO)            Sig: Take 1 tablet by mouth daily Reported on 3/23/2017    Class: " Historical    Route: Oral    Multiple Vitamins-Minerals (MULTIVITAMIN & MINERAL PO)            Sig: Take  by mouth.    Class: Historical    Route: Oral    tretinoin (RETIN-A) 0.025 % external cream  45 g 11 9/5/2019    [facility] - [place], [place] - [address] [facility]    Sig: Apply a pea-size amount to entire face nightly at bedtime.    Class: E-Prescribe        No Known Allergies  Immunization History   Administered Date(s) Administered     Influenza (IIV3) PF 11/20/2012, 10/27/2013       OB History   No obstetric history on file.     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    MRI otherwise normal     Insomnia      Osteopenia     T score -1.9 hip and back 1/19/11     Vasomotor rhinitis     uses nasalcort     Past Surgical History:   Procedure Laterality Date     basal cell ca excision       BIOPSY  2012    skin: moles and basal cell asmt     MOHS MICROGRAPHIC PROCEDURE       Family History   Problem Relation Age of Onset     KAMINIAANANTH Maternal Grandmother      CChiragAANANTH Maternal Grandfather      Breast Cancer Mother      Osteoporosis Mother      Bipolar Disorder Father         suicide at age 42     Depression Brother         Suicide     Cancer No family hx of         skin cancer     Melanoma No family hx of      Skin Cancer No family hx of      Social History     Socioeconomic History     Marital status:      Spouse name: None     Number of children: None     Years of education: None     Highest education level: None   Occupational History     None   Social Needs     Financial resource strain: None     Food insecurity     Worry: None     Inability: None     Transportation needs     Medical: None     Non-medical: None   Tobacco Use     Smoking status: Former Smoker     Packs/day: 0.20     Years: 5.00     Pack years: 1.00     Types: Cigarettes      "Last attempt to quit: 1999     Years since quittin.8     Smokeless tobacco: Never Used   Substance and Sexual Activity     Alcohol use: No     Drug use: No     Sexual activity: Not Currently     Partners: Male     Birth control/protection: None   Lifestyle     Physical activity     Days per week: None     Minutes per session: None     Stress: None   Relationships     Social connections     Talks on phone: None     Gets together: None     Attends [employer] service: None     Active member of club or organization: None     Attends meetings of clubs or organizations: None     Relationship status: None     Intimate partner violence     Fear of current or ex partner: None     Emotionally abused: None     Physically abused: None     Forced sexual activity: None   Other Topics Concern      Service Not Asked     Blood Transfusions Not Asked     Caffeine Concern Not Asked     Occupational Exposure Not Asked     Hobby Hazards Not Asked     Sleep Concern Not Asked     Stress Concern Not Asked     Weight Concern Not Asked     Special Diet Not Asked     Back Care Not Asked     Exercise Yes     Comment: runner, yoga, strength trains     Bike Helmet Not Asked     Seat Belt Not Asked     Self-Exams Not Asked     Parent/sibling w/ CABG, MI or angioplasty before 65F 55M? Not Asked   Social History Narrative    How much exercise per week? daily    How much calcium per day? supplements       How much caffeine per day? 2 tea    How much vitamin D per day? In supplements    Do you/your family wear seatbelts?  Yes    Do you/your family use safety helmets? Yes    Do you/your family use sunscreen? Yes    Do you/your family keep firearms in the home? No    Do you/your family have a smoke detector(s)? Yes        Do you feel safe in your home? Yes    Has anyone ever touched you in an unwanted manner? No     Explain reviewed [facility]        ROS    EXAM:  Blood pressure 104/68, pulse 57, height 1.626 m (5' 4\"), weight 48.9 " kg (107 lb 11.2 oz), not currently breastfeeding. Body mass index is 18.49 kg/m .  General appearance: Pleasant female in no acute distress.     BREAST EXAM:  Breast: Without visible skin changes. No dimpling or lesions seen.   Breasts supple, non-tender with palpation, no dominant mass, nodularity, or nipple discharge noted bilaterally. Axillary nodes negative.      PELVIC EXAM:  EG/BUS: Normal genital architecture without lesions, erythema or abnormal secretions Bartholin's, Urethra, [place]'s normal   Urethral meatus: normal    Urethra: no masses, tenderness, or scarring    Bladder: no masses or tenderness    Vagina: moist, pink, rugae with thin bloody secretions  Cervix: Nulliparous, and no lesions  Uterus: anteverted,  and small, smooth, firm, mobile w/o pain  Adnexa: Within normal limits and No masses, nodularity, tenderness  Rectum:anus normal       ASSESSMENT:  Encounter Diagnoses   Name Primary?     Encounter for gynecological examination without abnormal finding      Abnormal uterine bleeding (AUB) Yes     Pain in joint, ankle and foot, unspecified laterality       49 year old Female Pelvic and Breast Exam  Abnormal uterine bleeding in the setting of a history of hypothalamic secondary amenorrhea    PLAN:   Orders Placed This Encounter   Procedures     Pelvic and Breast Exam Procedure []     US Pelvic Complete with Transvaginal     RHEUMATOLOGY REFERRAL     Discussed that if ultrasound shows thickened lining will recommend EMB.  Previously ordered FSH and estradiol.    Mammogram and DEXA today.  Return in one year/PRN for preventive care or problems/concerns.     Verbalized understanding and agreement with visit plan.  Kay Buck MD

## 2020-07-06 ENCOUNTER — TELEPHONE (OUTPATIENT)
Dept: RHEUMATOLOGY | Facility: CLINIC | Age: 50
End: 2020-07-06

## 2020-07-06 NOTE — LETTER
[facility]  [address], Mail [address]                        [place] [address]  Ph: [phone]                     Fax: [phone]   Date: July 6, 2020  Name: Kay Ceja  YOB: 1970  MRN: [id number]   Dear Referring Provider,  Thank you for the referral for Kay Ceja, 1970. After careful review by the Rheumatologist, your patient does not meet the criteria for an appointment. We encourage you to refer your patient to one of our community sites:    [facility]    Provider of your choice  Thank you for your support and understanding.    Sincerely,  The Division of Arthritis and Autoimmune Disorders

## 2020-07-06 NOTE — TELEPHONE ENCOUNTER
Patient is referred by Kay Buck for joint pain in ankle.    Patient complains of: ankle pain, history of positive REI    Has patient had imaging that pertains to referral? No    Has patient had labs that pertains to referral reason: Yes    Component      Latest Ref Rng & Units 6/29/2020   [id number] Method       SSOP   B locus       B27 Neg   Sed Rate      0 - 30 mm/h 9   Rheumatoid Factor      <12 IU/mL <7     Clinical review of patient's chart has been performed. Patient does not meet the criteria for an appointment.  A letter has been sent the referring encouraging them to send the patient to one of our community sites. Patient needs to follow up with their referring or PCP for further direction.     Smitha Fitzgerald CMA   7/6/2020 3:49 PM

## 2020-09-02 ENCOUNTER — MYC MEDICAL ADVICE (OUTPATIENT)
Dept: OBGYN | Facility: CLINIC | Age: 50
End: 2020-09-02

## 2020-09-02 DIAGNOSIS — Z00.00 PREVENTATIVE HEALTH CARE: ICD-10-CM

## 2020-09-02 DIAGNOSIS — F41.9 ANXIETY: Primary | ICD-10-CM

## 2020-09-02 DIAGNOSIS — Z78.0 MENOPAUSE: ICD-10-CM

## 2020-09-17 RX ORDER — CLONAZEPAM 0.5 MG/1
0.5 TABLET ORAL
Qty: 5 TABLET | Refills: 0 | Status: SHIPPED | OUTPATIENT
Start: 2020-09-17 | End: 2021-10-11

## 2020-09-28 ENCOUNTER — OFFICE VISIT (OUTPATIENT)
Dept: INTERNAL MEDICINE | Facility: CLINIC | Age: 50
End: 2020-09-28
Attending: INTERNAL MEDICINE
Payer: COMMERCIAL

## 2020-09-28 VITALS
HEIGHT: 64 IN | BODY MASS INDEX: 18.66 KG/M2 | WEIGHT: 109.3 LBS | DIASTOLIC BLOOD PRESSURE: 66 MMHG | SYSTOLIC BLOOD PRESSURE: 103 MMHG | HEART RATE: 74 BPM

## 2020-09-28 DIAGNOSIS — Z00.00 PREVENTATIVE HEALTH CARE: ICD-10-CM

## 2020-09-28 DIAGNOSIS — G43.009 MIGRAINE WITHOUT AURA AND WITHOUT STATUS MIGRAINOSUS, NOT INTRACTABLE: Primary | ICD-10-CM

## 2020-09-28 DIAGNOSIS — M85.80 OSTEOPENIA, UNSPECIFIED LOCATION: ICD-10-CM

## 2020-09-28 LAB
ANION GAP SERPL CALCULATED.3IONS-SCNC: 7 MMOL/L (ref 3–14)
BUN SERPL-MCNC: 18 MG/DL (ref 7–30)
CALCIUM SERPL-MCNC: 9.5 MG/DL (ref 8.5–10.1)
CHLORIDE SERPL-SCNC: 104 MMOL/L (ref 94–109)
CO2 SERPL-SCNC: 27 MMOL/L (ref 20–32)
CREAT SERPL-MCNC: 0.66 MG/DL (ref 0.52–1.04)
ESTRADIOL SERPL-MCNC: <11 PG/ML
FSH SERPL-ACNC: 62.3 IU/L
GFR SERPL CREATININE-BSD FRML MDRD: >90 ML/MIN/{1.73_M2}
GLUCOSE SERPL-MCNC: 93 MG/DL (ref 70–99)
POTASSIUM SERPL-SCNC: 4.2 MMOL/L (ref 3.4–5.3)
SODIUM SERPL-SCNC: 138 MMOL/L (ref 133–144)
TSH SERPL DL<=0.005 MIU/L-ACNC: 1.36 MU/L (ref 0.4–4)

## 2020-09-28 PROCEDURE — G0463 HOSPITAL OUTPT CLINIC VISIT: HCPCS | Mod: ZF

## 2020-09-28 PROCEDURE — 83001 ASSAY OF GONADOTROPIN (FSH): CPT | Performed by: OBSTETRICS & GYNECOLOGY

## 2020-09-28 PROCEDURE — 82670 ASSAY OF TOTAL ESTRADIOL: CPT | Performed by: OBSTETRICS & GYNECOLOGY

## 2020-09-28 PROCEDURE — 36415 COLL VENOUS BLD VENIPUNCTURE: CPT | Performed by: INTERNAL MEDICINE

## 2020-09-28 PROCEDURE — 84443 ASSAY THYROID STIM HORMONE: CPT | Performed by: INTERNAL MEDICINE

## 2020-09-28 PROCEDURE — 80048 BASIC METABOLIC PNL TOTAL CA: CPT | Performed by: INTERNAL MEDICINE

## 2020-09-28 RX ORDER — SUMATRIPTAN 50 MG/1
50 TABLET, FILM COATED ORAL
Qty: 18 TABLET | Refills: 3 | Status: SHIPPED | OUTPATIENT
Start: 2020-09-28 | End: 2021-10-11

## 2020-09-28 ASSESSMENT — MIFFLIN-ST. JEOR: SCORE: 1095.78

## 2020-09-28 ASSESSMENT — ENCOUNTER SYMPTOMS
BRUISES/BLEEDS EASILY: 0
FATIGUE: 0
INCREASED ENERGY: 0
POLYDIPSIA: 0
BACK PAIN: 0
POLYPHAGIA: 0
ALTERED TEMPERATURE REGULATION: 0
SINUS CONGESTION: 0
DEPRESSION: 0
PANIC: 0
VOMITING: 0
COUGH: 0
CONSTIPATION: 0
ABDOMINAL PAIN: 0
NAUSEA: 0
DECREASED CONCENTRATION: 0
HEADACHES: 1
DIARRHEA: 0
INSOMNIA: 0
NERVOUS/ANXIOUS: 0
DYSPNEA ON EXERTION: 0
SWOLLEN GLANDS: 0
FEVER: 0

## 2020-09-28 NOTE — NURSING NOTE
Chief Complaint   Patient presents with     Establish Care     Establish care and discuss headaches, Raynaud's       See MARIANGEL Garcia 9/28/2020

## 2020-09-28 NOTE — PROGRESS NOTES
HPI  Patient is here for evaluation of headaches. She reports that she had headaches in the past and has been headache-free for many years. She reports that she had oral surgery in August. She describes her experience as stressful. She reports that she had headache associated with nausea and vomiting a week after the surgery. She states that since that time she had a few headaches. She reports that she had pain on the left side of her head. She has been advised by her oral surgeon to be seen by her primary care provider. Patient describes her headache as pressure with some throbbing. She rates the pain at 9/10 in severity. She also reports photo- and phonophobia. She reports that she was getting similar headache when she was a young adult. Patient reports that her mother had similar headaches. Patient reports that the headache can last for several days, most of the time - about a day or so.   Patient also reports that she has been dealing with Raynaud's phenomenon. She states that she has had some bruising that lasted a few days. She has been paying close attention to cold exposure.   Patient also reports a stress fracture in her fibula due to running. She was found to have osteopenia on DEXA scan.     Review of Systems     Constitutional:  Negative for fever, fatigue and increased energy.   HENT:  Negative for dry mouth and sinus congestion.    Eyes:  Negative for tunnel vision.   Respiratory:   Negative for cough and dyspnea on exertion.    Cardiovascular:  Negative for chest pain, dyspnea on exertion and edema.   Gastrointestinal:  Negative for nausea, vomiting, abdominal pain, diarrhea and constipation.   Musculoskeletal:  Negative for back pain.   Skin:  Negative for itching and rash.   Neurological:  Positive for headaches.   Endo/Heme:  Negative for anemia, swollen glands and bruises/bleeds easily.   Psychiatric/Behavioral:  Negative for depression, decreased concentration, mood swings and panic attacks.     Endocrine:  Negative for altered temperature regulation, polyphagia, polydipsia, unwanted hair growth and change in facial hair.      Current Outpatient Medications   Medication     Calcium Carbonate-Vit D-Min (CALCIUM 1200 PO)     clonazePAM (KLONOPIN) 0.5 MG tablet     Multiple Vitamins-Minerals (MULTIVITAMIN & MINERAL PO)     tretinoin (RETIN-A) 0.025 % external cream     No current facility-administered medications for this visit.        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    MRI otherwise normal     Insomnia      Osteopenia     T score -1.9 hip and back 1/19/11     Vasomotor rhinitis     uses nasalcort     Past Surgical History:   Procedure Laterality Date     basal cell ca excision       BIOPSY  2012    skin: moles and basal cell asmt     DENTAL SURGERY  08/21/2020    Dental implant     MOHS MICROGRAPHIC PROCEDURE       Family History   Problem Relation Age of Onset     C.A.D. Maternal Grandmother      C.A.D. Maternal Grandfather      Breast Cancer Mother      Osteoporosis Mother      Bipolar Disorder Father         suicide at age 42     Depression Brother         Suicide     Cancer No family hx of         skin cancer     Melanoma No family hx of      Skin Cancer No family hx of      Social History     Socioeconomic History     Marital status:      Spouse name: Not on file     Number of children: Not on file     Years of education: Not on file     Highest education level: Not on file   Occupational History     Not on file   Social Needs     Financial resource strain: Not on file     Food insecurity     Worry: Not on file     Inability: Not on file     Transportation needs     Medical: Not on file     Non-medical: Not on file   Tobacco Use     Smoking status: Former Smoker     Packs/day: 0.20     Years: 5.00     Pack years: 1.00     Types: Cigarettes      Last attempt to quit: 1999     Years since quittin.0     Smokeless tobacco: Never Used   Substance and Sexual Activity     Alcohol use: No     Drug use: No     Sexual activity: Not Currently     Partners: Male     Birth control/protection: None   Lifestyle     Physical activity     Days per week: Not on file     Minutes per session: Not on file     Stress: Not on file   Relationships     Social connections     Talks on phone: Not on file     Gets together: Not on file     Attends [employer] service: Not on file     Active member of club or organization: Not on file     Attends meetings of clubs or organizations: Not on file     Relationship status: Not on file     Intimate partner violence     Fear of current or ex partner: Not on file     Emotionally abused: Not on file     Physically abused: Not on file     Forced sexual activity: Not on file   Other Topics Concern      Service Not Asked     Blood Transfusions Not Asked     Caffeine Concern Not Asked     Occupational Exposure Not Asked     Hobby Hazards Not Asked     Sleep Concern Not Asked     Stress Concern Not Asked     Weight Concern Not Asked     Special Diet Not Asked     Back Care Not Asked     Exercise Yes     Comment: runner, yoga, strength trains     Bike Helmet Not Asked     Seat Belt Not Asked     Self-Exams Not Asked     Parent/sibling w/ CABG, MI or angioplasty before 65F 55M? Not Asked   Social History Narrative    How much exercise per week? daily    How much calcium per day? supplements       How much caffeine per day? 2 tea    How much vitamin D per day? In supplements    Do you/your family wear seatbelts?  Yes    Do you/your family use safety helmets? Yes    Do you/your family use sunscreen? Yes    Do you/your family keep firearms in the home? No    Do you/your family have a smoke detector(s)? Yes        Do you feel safe in your home? Yes    Has anyone ever touched you in an unwanted manner? No     Explain reviewed cmkim  "8-       Vitals:    09/28/20 1022   BP: 103/66   BP Location: Left arm   Patient Position: Chair   Pulse: 74   Weight: 49.6 kg (109 lb 4.8 oz)   Height: 1.626 m (5' 4\")         Physical Exam  Vitals signs and nursing note reviewed.   Constitutional:       Appearance: Normal appearance.   HENT:      Head: Normocephalic and atraumatic.   Eyes:      Pupils: Pupils are equal, round, and reactive to light.   Neck:      Musculoskeletal: Normal range of motion.   Cardiovascular:      Rate and Rhythm: Normal rate.   Pulmonary:      Effort: Pulmonary effort is normal.   Abdominal:      General: Abdomen is flat.   Musculoskeletal: Normal range of motion.         General: No edema.   Skin:     General: Skin is warm and dry.   Neurological:      General: No focal deficit present.      Mental Status: She is alert and oriented to person, place, and time.   Psychiatric:         Mood and Affect: Mood normal.         Behavior: Behavior normal.         Thought Content: Thought content normal.         Judgment: Judgment normal.       Assessment and Plan:  Kay was seen today for establish care.    Diagnoses and all orders for this visit:    Migraine without aura and without status migrainosus, not intractable. Discussed possible causes of headache, strongly favor migraine. Will try sumatriptan for pain management. Referral to Neurology and massage therapy were given to the patient today.   -     Cancel: MASSAGE THERAPY REFERRAL  -     SUMAtriptan (IMITREX) 50 MG tablet; Take 1 tablet (50 mg) by mouth at onset of headache for migraine May repeat in 2 hours. Max 4 tablets/24 hours.  -     NEUROLOGY ADULT REFERRAL  -     MASSAGE THERAPY REFERRAL    Osteopenia, unspecified location. Referral to Dr. Miles was given to address possible osteoporosis.   -     FAMILY PRACTICE REFERRAL    Preventative health care. Reviewed age-appropriate cancer screening with patient. Recommend screening for diabetes and hyperlipidemia.  -     Cancel: " Lipid Profile  -     Basic metabolic panel  -     TSH with free T4 reflex  -     Fecal colorectal cancer screen FIT; Future      Total time spent 45  minutes.  More than 50% of the time spent with Ms. Ceja on counseling / coordinating her care    Mora Campoverde MD

## 2020-09-28 NOTE — LETTER
9/28/2020     RE: Kay Ceja  [address]  [facility] [address]     Dear Colleague,    Thank you for referring your patient, Kay Ceja, to the [facility]  at [facility]. Please see a copy of my visit note below.    HPI  Patient is here for evaluation of headaches. She reports that she had headaches in the past and has been headache-free for many years. She reports that she had oral surgery in August. She describes her experience as stressful. She reports that she had headache associated with nausea and vomiting a week after the surgery. She states that since that time she had a few headaches. She reports that she had pain on the left side of her head. She has been advised by her oral surgeon to be seen by her primary care provider. Patient describes her headache as pressure with some throbbing. She rates the pain at 9/10 in severity. She also reports photo- and phonophobia. She reports that she was getting similar headache when she was a young adult. Patient reports that her mother had similar headaches. Patient reports that the headache can last for several days, most of the time - about a day or so.   Patient also reports that she has been dealing with Raynaud's phenomenon. She states that she has had some bruising that lasted a few days. She has been paying close attention to cold exposure.   Patient also reports a stress fracture in her fibula due to running. She was found to have osteopenia on DEXA scan.     Review of Systems     Constitutional:  Negative for fever, fatigue and increased energy.   HENT:  Negative for dry mouth and sinus congestion.    Eyes:  Negative for tunnel vision.   Respiratory:   Negative for cough and dyspnea on exertion.    Cardiovascular:  Negative for chest pain, dyspnea on exertion and edema.   Gastrointestinal:  Negative for nausea, vomiting, abdominal pain, diarrhea and constipation.   Musculoskeletal:  Negative for  back pain.   Skin:  Negative for itching and rash.   Neurological:  Positive for headaches.   Endo/Heme:  Negative for anemia, swollen glands and bruises/bleeds easily.   Psychiatric/Behavioral:  Negative for depression, decreased concentration, mood swings and panic attacks.    Endocrine:  Negative for altered temperature regulation, polyphagia, polydipsia, unwanted hair growth and change in facial hair.      Current Outpatient Medications   Medication     Calcium Carbonate-Vit D-Min (CALCIUM 1200 PO)     clonazePAM (KLONOPIN) 0.5 MG tablet     Multiple Vitamins-Minerals (MULTIVITAMIN & MINERAL PO)     tretinoin (RETIN-A) 0.025 % external cream     No current facility-administered medications for this visit.        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    MRI otherwise normal     Insomnia      Osteopenia     T score -1.9 hip and back 1/19/11     Vasomotor rhinitis     uses nasalcort     Past Surgical History:   Procedure Laterality Date     basal cell ca excision       BIOPSY  2012    skin: moles and basal cell asmt     DENTAL SURGERY  08/21/2020    Dental implant     MOHS MICROGRAPHIC PROCEDURE       Family History   Problem Relation Age of Onset     KAMINIAANANTH Maternal Grandmother      KAMINIAANANTH Maternal Grandfather      Breast Cancer Mother      Osteoporosis Mother      Bipolar Disorder Father         suicide at age 42     Depression Brother         Suicide     Cancer No family hx of         skin cancer     Melanoma No family hx of      Skin Cancer No family hx of      Social History     Socioeconomic History     Marital status:      Spouse name: Not on file     Number of children: Not on file     Years of education: Not on file     Highest education level: Not on file   Occupational History     Not on file   Social Needs     Financial resource strain: Not on  file     Food insecurity     Worry: Not on file     Inability: Not on file     Transportation needs     Medical: Not on file     Non-medical: Not on file   Tobacco Use     Smoking status: Former Smoker     Packs/day: 0.20     Years: 5.00     Pack years: 1.00     Types: Cigarettes     Last attempt to quit: 1999     Years since quittin.0     Smokeless tobacco: Never Used   Substance and Sexual Activity     Alcohol use: No     Drug use: No     Sexual activity: Not Currently     Partners: Male     Birth control/protection: None   Lifestyle     Physical activity     Days per week: Not on file     Minutes per session: Not on file     Stress: Not on file   Relationships     Social connections     Talks on phone: Not on file     Gets together: Not on file     Attends [employer] service: Not on file     Active member of club or organization: Not on file     Attends meetings of clubs or organizations: Not on file     Relationship status: Not on file     Intimate partner violence     Fear of current or ex partner: Not on file     Emotionally abused: Not on file     Physically abused: Not on file     Forced sexual activity: Not on file   Other Topics Concern      Service Not Asked     Blood Transfusions Not Asked     Caffeine Concern Not Asked     Occupational Exposure Not Asked     Hobby Hazards Not Asked     Sleep Concern Not Asked     Stress Concern Not Asked     Weight Concern Not Asked     Special Diet Not Asked     Back Care Not Asked     Exercise Yes     Comment: runner, yoga, strength trains     Bike Helmet Not Asked     Seat Belt Not Asked     Self-Exams Not Asked     Parent/sibling w/ CABG, MI or angioplasty before 65F 55M? Not Asked   Social History Narrative    How much exercise per week? daily    How much calcium per day? supplements       How much caffeine per day? 2 tea    How much vitamin D per day? In supplements    Do you/your family wear seatbelts?  Yes    Do you/your family use safety  "helmets? Yes    Do you/your family use sunscreen? Yes    Do you/your family keep firearms in the home? No    Do you/your family have a smoke detector(s)? Yes        Do you feel safe in your home? Yes    Has anyone ever touched you in an unwanted manner? No     Explain reviewed [facility] 8-       Vitals:    09/28/20 1022   BP: 103/66   BP Location: Left arm   Patient Position: Chair   Pulse: 74   Weight: 49.6 kg (109 lb 4.8 oz)   Height: 1.626 m (5' 4\")     Physical Exam  Vitals signs and nursing note reviewed.   Constitutional:       Appearance: Normal appearance.   HENT:      Head: Normocephalic and atraumatic.   Eyes:      Pupils: Pupils are equal, round, and reactive to light.   Neck:      Musculoskeletal: Normal range of motion.   Cardiovascular:      Rate and Rhythm: Normal rate.   Pulmonary:      Effort: Pulmonary effort is normal.   Abdominal:      General: Abdomen is flat.   Musculoskeletal: Normal range of motion.         General: No edema.   Skin:     General: Skin is warm and dry.   Neurological:      General: No focal deficit present.      Mental Status: She is alert and oriented to person, place, and time.   Psychiatric:         Mood and Affect: Mood normal.         Behavior: Behavior normal.         Thought Content: Thought content normal.         Judgment: Judgment normal.     Assessment and Plan:  Kay was seen today for establish care.    Diagnoses and all orders for this visit:    Migraine without aura and without status migrainosus, not intractable. Discussed possible causes of headache, strongly favor migraine. Will try sumatriptan for pain management. Referral to Neurology and massage therapy were given to the patient today.   -     Cancel: MASSAGE THERAPY REFERRAL  -     SUMAtriptan (IMITREX) 50 MG tablet; Take 1 tablet (50 mg) by mouth at onset of headache for migraine May repeat in 2 hours. Max 4 tablets/24 hours.  -     NEUROLOGY ADULT REFERRAL  -     MASSAGE THERAPY " REFERRAL    Osteopenia, unspecified location. Referral to Dr. Miles was given to address possible osteoporosis.   -     FAMILY PRACTICE REFERRAL    Preventative health care. Reviewed age-appropriate cancer screening with patient. Recommend screening for diabetes and hyperlipidemia.  -     Cancel: Lipid Profile  -     Basic metabolic panel  -     TSH with free T4 reflex  -     Fecal colorectal cancer screen FIT; Future      Total time spent 45  minutes.  More than 50% of the time spent with Ms. Ceja on counseling / coordinating her care  Mora Campoverde MD

## 2020-11-30 ENCOUNTER — MYC MEDICAL ADVICE (OUTPATIENT)
Dept: OBGYN | Facility: CLINIC | Age: 50
End: 2020-11-30

## 2020-12-14 ENCOUNTER — HEALTH MAINTENANCE LETTER (OUTPATIENT)
Age: 50
End: 2020-12-14

## 2020-12-22 ENCOUNTER — ANCILLARY PROCEDURE (OUTPATIENT)
Dept: ULTRASOUND IMAGING | Facility: CLINIC | Age: 50
End: 2020-12-22
Attending: OBSTETRICS & GYNECOLOGY
Payer: COMMERCIAL

## 2020-12-22 DIAGNOSIS — N93.9 ABNORMAL UTERINE BLEEDING (AUB): ICD-10-CM

## 2020-12-22 PROCEDURE — 76830 TRANSVAGINAL US NON-OB: CPT

## 2020-12-22 PROCEDURE — 76830 TRANSVAGINAL US NON-OB: CPT | Mod: 26 | Performed by: OBSTETRICS & GYNECOLOGY

## 2020-12-23 DIAGNOSIS — N93.9 ABNORMAL UTERINE BLEEDING (AUB): Primary | ICD-10-CM

## 2021-02-05 ENCOUNTER — TELEPHONE (OUTPATIENT)
Dept: OBGYN | Facility: CLINIC | Age: 51
End: 2021-02-05

## 2021-02-08 NOTE — TELEPHONE ENCOUNTER
Left a message for Kay to call back to discuss colonoscopy vs FIT test.    Joanne Samuels RN  [facility]             Saw Dr. Campoverde in September and says order was placed for colonoscopy - wants to know how to set this up.

## 2021-02-09 ENCOUNTER — ANCILLARY PROCEDURE (OUTPATIENT)
Dept: ULTRASOUND IMAGING | Facility: CLINIC | Age: 51
End: 2021-02-09
Attending: OBSTETRICS & GYNECOLOGY
Payer: COMMERCIAL

## 2021-02-09 DIAGNOSIS — N93.9 ABNORMAL UTERINE BLEEDING (AUB): ICD-10-CM

## 2021-02-09 PROCEDURE — 76830 TRANSVAGINAL US NON-OB: CPT | Mod: 26 | Performed by: OBSTETRICS & GYNECOLOGY

## 2021-02-09 PROCEDURE — 76830 TRANSVAGINAL US NON-OB: CPT

## 2021-02-13 DIAGNOSIS — Z00.00 PREVENTATIVE HEALTH CARE: ICD-10-CM

## 2021-02-13 PROCEDURE — 82274 ASSAY TEST FOR BLOOD FECAL: CPT | Performed by: INTERNAL MEDICINE

## 2021-02-19 LAB — HEMOCCULT STL QL IA: NEGATIVE

## 2021-04-18 ENCOUNTER — HEALTH MAINTENANCE LETTER (OUTPATIENT)
Age: 51
End: 2021-04-18

## 2021-04-27 ENCOUNTER — OFFICE VISIT (OUTPATIENT)
Dept: DERMATOLOGY | Facility: CLINIC | Age: 51
End: 2021-04-27
Payer: COMMERCIAL

## 2021-04-27 DIAGNOSIS — L60.3 ONYCHODYSTROPHY: ICD-10-CM

## 2021-04-27 DIAGNOSIS — Z85.828 HISTORY OF SKIN CANCER: ICD-10-CM

## 2021-04-27 DIAGNOSIS — L82.1 SK (SEBORRHEIC KERATOSIS): Primary | ICD-10-CM

## 2021-04-27 DIAGNOSIS — L73.8 SENILE SEBACEOUS GLAND HYPERPLASIA: ICD-10-CM

## 2021-04-27 PROCEDURE — 99213 OFFICE O/P EST LOW 20 MIN: CPT | Performed by: DERMATOLOGY

## 2021-04-27 ASSESSMENT — PAIN SCALES - GENERAL: PAINLEVEL: NO PAIN (0)

## 2021-04-27 NOTE — LETTER
4/27/2021       RE: Kay Ceja  [address]  [facility] [address]     Dear Colleague,    Thank you for referring your patient, Kay Ceja, to the [facility] [place] at [facility]. Please see a copy of my visit note below.    [facility] D[facility]ology Note  Encounter Date: Apr 27, 2021  Office Visit     Dermatology Problem List:  1. History of NMSC:              - BCC, R superior forehead s/p MMS 10/2014              - BCC, R Buddhist, s/p MMS in [place] in 2012  2. Multiple (>100) atypical nevi on trunk and extremities. Prior bxs with moderate dysplasia.              - will refer for Dr. Belle mole mapping  3. AKs, states no reaction (per patient), but also documented as improved after field therapy with 5-FU 1-2x/week  4. Flat warts, dorsal PIPs. Cryo 3/14/2016.  5. Acne. Benzaclin.  6. Sebaceous hyperplasia. Tretinoin 0.025% cream.  7. 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 Resolved with TMC 0.1% cream  8. Seborrheic Keratosis    ____________________________________________    Assessment & Plan:     # Onychodystrophy bilateral middle fingernails.   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  Does type   - Amlactin daily  - Allow nails to grow to consider clipping  -Avoid trauma     # Sebaceous hyperplasia, seborrheic keratoses.   - Very small at this time on forhead   - Discussed monitor for change     # History of skin cancer and dysplastic nevi  -Discussed warning signs of skin cancer  - Refer to Dr Belle to review mole mapping        Follow-up: 3 month(s) in-person, or earlier for new or changing lesions    Staff:     Joanne Hancock MD  ____________________________________________    CC: No chief complaint on file.    HPI:  Ms. Kay Ceja is a(n) 50 year old female who presents today as a return patient for 2 issues:  1.  Nail changes on bilateral middle  fingers present for some time.  Diagnosed with perleche and has concerns that fungus could be in nails.  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  Does push back and clip cuticles  2. Face exam- has noted some new small spots of forehead. None tender or bleeding.  Has history of BCC x 2 of forhead    Patient is otherwise feeling well, without additional skin concerns.     Labs Reviewed:  N/A    Physical Exam:  Vitals: There were no vitals taken for this visit.  SKIN: Focused examination of hands, fingernails and face was performed.  - bilateral middle fingernails with onycholysis but no debris and limited to the medial edges of both nail.  Some thinning of all nail plates.  Absent cuticles  - There is no erythema, telangectasias, nodularity, or pigmentation on the previous skin cancer site..   - Central forehead with very small papules consistent with sebaceous hyperplasia and seborrheic keratoses on dermoscopy  - No other lesions of concern on areas examined.     Medications:  Current Outpatient Medications   Medication     Calcium Carbonate-Vit D-Min (CALCIUM 1200 PO)     clonazePAM (KLONOPIN) 0.5 MG tablet     Multiple Vitamins-Minerals (MULTIVITAMIN & MINERAL PO)     SUMAtriptan (IMITREX) 50 MG tablet     tretinoin (RETIN-A) 0.025 % external cream     No current facility-administered medications for this visit.       Past Medical History:   Patient Active Problem List   Diagnosis     Anxiety     Amenorrhea     Osteopenia     Senile sebaceous gland hyperplasia     Atypical nevi     Basal cell carcinoma of right forehead s/p mms 10-13-14     Pain in joint, pelvic region and thigh     Pain in right ankle     Peroneal tendinitis     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    MRI otherwise normal     Insomnia      Osteopenia     T score -1.9  hip and back 1/19/11     Vasomotor rhinitis     uses nasalcort       CC Referred Self, MD  No address on file on close of this encounter.

## 2021-04-27 NOTE — NURSING NOTE
Dermatology Rooming Note    Kay Ceja's goals for this visit include:   Chief Complaint   Patient presents with     Derm Problem     Discuss finger nails - possible fungus.     Derm Problem     Check spots on face.     Julia Ariza, CMA

## 2021-04-27 NOTE — PROGRESS NOTES
[facility] Dermatology Note  Encounter Date: Apr 27, 2021  Office Visit     Dermatology Problem List:  1. History of NMSC:              - BCC, R superior forehead s/p MMS 10/2014              - BCC, R [place], s/p MMS in [place] in 2012  2. Multiple (>100) atypical nevi on trunk and extremities. Prior bxs with moderate dysplasia.              - will refer for Dr. Belle mole mapping  3. AKs, states no reaction (per patient), but also documented as improved after field therapy with 5-FU 1-2x/week  4. Flat warts, dorsal PIPs. Cryo 3/14/2016.  5. Acne. Benzaclin.  6. Sebaceous hyperplasia. Tretinoin 0.025% cream.  7. 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 Resolved with TMC 0.1% cream  8. Seborrheic Keratosis    ____________________________________________    Assessment & Plan:     # Onychodystrophy bilateral middle fingernails.   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  Does type   - Amlactin daily  - Allow nails to grow to consider clipping  -Avoid trauma     # Sebaceous hyperplasia, seborrheic keratoses.   - Very small at this time on forhead   - Discussed monitor for change     # History of skin cancer and dysplastic nevi  -Discussed warning signs of skin cancer  - Refer to Dr Belle to review mole mapping        Follow-up: 3 month(s) in-person, or earlier for new or changing lesions    Staff:     Joanne Hancock MD  ____________________________________________    CC: No chief complaint on file.    HPI:  Ms. Kay Ceja is a(n) 50 year old female who presents today as a return patient for 2 issues:  1.  Nail changes on bilateral middle fingers present for some time.  Diagnosed with perleche and has concerns that fungus could be in nails.  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  Does push back and clip cuticles  2. Face exam- has noted some new small spots of forehead. None tender or bleeding.  Has history of BCC x  2 of forhead    Patient is otherwise feeling well, without additional skin concerns.     Labs Reviewed:  N/A    Physical Exam:  Vitals: There were no vitals taken for this visit.  SKIN: Focused examination of hands, fingernails and face was performed.  - bilateral middle fingernails with onycholysis but no debris and limited to the medial edges of both nail.  Some thinning of all nail plates.  Absent cuticles  - There is no erythema, telangectasias, nodularity, or pigmentation on the previous skin cancer site..   - Central forehead with very small papules consistent with sebaceous hyperplasia and seborrheic keratoses on dermoscopy  - No other lesions of concern on areas examined.     Medications:  Current Outpatient Medications   Medication     Calcium Carbonate-Vit D-Min (CALCIUM 1200 PO)     clonazePAM (KLONOPIN) 0.5 MG tablet     Multiple Vitamins-Minerals (MULTIVITAMIN & MINERAL PO)     SUMAtriptan (IMITREX) 50 MG tablet     tretinoin (RETIN-A) 0.025 % external cream     No current facility-administered medications for this visit.       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

## 2021-07-05 ASSESSMENT — ANXIETY QUESTIONNAIRES
7. FEELING AFRAID AS IF SOMETHING AWFUL MIGHT HAPPEN: NOT AT ALL
5. BEING SO RESTLESS THAT IT IS HARD TO SIT STILL: NOT AT ALL
1. FEELING NERVOUS, ANXIOUS, OR ON EDGE: NOT AT ALL
GAD7 TOTAL SCORE: 0
3. WORRYING TOO MUCH ABOUT DIFFERENT THINGS: NOT AT ALL
GAD7 TOTAL SCORE: 0
7. FEELING AFRAID AS IF SOMETHING AWFUL MIGHT HAPPEN: NOT AT ALL
6. BECOMING EASILY ANNOYED OR IRRITABLE: NOT AT ALL
2. NOT BEING ABLE TO STOP OR CONTROL WORRYING: NOT AT ALL
4. TROUBLE RELAXING: NOT AT ALL

## 2021-07-05 ASSESSMENT — ENCOUNTER SYMPTOMS
NAIL CHANGES: 1
HYPERTENSION: 0
ORTHOPNEA: 0
LIGHT-HEADEDNESS: 0
NECK PAIN: 0
EXERCISE INTOLERANCE: 0
POOR WOUND HEALING: 0
MUSCLE WEAKNESS: 0
SKIN CHANGES: 1
HYPOTENSION: 0
JOINT SWELLING: 0
SYNCOPE: 0
PALPITATIONS: 0
MUSCLE CRAMPS: 1
SLEEP DISTURBANCES DUE TO BREATHING: 0
LEG PAIN: 0
STIFFNESS: 1
MYALGIAS: 0
BACK PAIN: 0
ARTHRALGIAS: 0

## 2021-07-06 ASSESSMENT — ANXIETY QUESTIONNAIRES: GAD7 TOTAL SCORE: 0

## 2021-07-08 ENCOUNTER — OFFICE VISIT (OUTPATIENT)
Dept: OBGYN | Facility: CLINIC | Age: 51
End: 2021-07-08
Attending: OBSTETRICS & GYNECOLOGY
Payer: COMMERCIAL

## 2021-07-08 ENCOUNTER — ANCILLARY PROCEDURE (OUTPATIENT)
Dept: MAMMOGRAPHY | Facility: CLINIC | Age: 51
End: 2021-07-08
Attending: OBSTETRICS & GYNECOLOGY
Payer: COMMERCIAL

## 2021-07-08 VITALS
DIASTOLIC BLOOD PRESSURE: 67 MMHG | WEIGHT: 108 LBS | HEART RATE: 59 BPM | BODY MASS INDEX: 18.44 KG/M2 | SYSTOLIC BLOOD PRESSURE: 104 MMHG | HEIGHT: 64 IN

## 2021-07-08 DIAGNOSIS — Z01.419 ENCOUNTER FOR GYNECOLOGICAL EXAMINATION WITHOUT ABNORMAL FINDING: Primary | ICD-10-CM

## 2021-07-08 DIAGNOSIS — Z12.31 VISIT FOR SCREENING MAMMOGRAM: ICD-10-CM

## 2021-07-08 PROCEDURE — 77067 SCR MAMMO BI INCL CAD: CPT

## 2021-07-08 PROCEDURE — G0101 CA SCREEN;PELVIC/BREAST EXAM: HCPCS | Performed by: OBSTETRICS & GYNECOLOGY

## 2021-07-08 PROCEDURE — G0463 HOSPITAL OUTPT CLINIC VISIT: HCPCS

## 2021-07-08 PROCEDURE — 77067 SCR MAMMO BI INCL CAD: CPT | Mod: 26 | Performed by: STUDENT IN AN ORGANIZED HEALTH CARE EDUCATION/TRAINING PROGRAM

## 2021-07-08 ASSESSMENT — PAIN SCALES - GENERAL: PAINLEVEL: NO PAIN (0)

## 2021-07-08 ASSESSMENT — MIFFLIN-ST. JEOR: SCORE: 1089.88

## 2021-07-08 NOTE — PROGRESS NOTES
Progress Note    SUBJECTIVE:  Kay Ceja is an 50 year old  , who requests a breast and pelvic exam.    Patient reports that she is overall doing well. She is excited about an upcoming trip to [place] this summer. Pt's only complaint today is that she has been having some minor swelling in her feet and feels she becomes SOB more easily with exercise over the past 1.5 years. She does not currently have a primary care doctor.     Pt reports her last period was in April of this year and was very light. She thinks this may be her last period. Denies any hot flashes or vaginal dryness. Pt previously was amenorrheic for periods of less than a year and had return of menses, had USx2 with endometrial thickness of 4.4 and 6 mm.    Pt is getting her mammogram today. She last had FIT testing in 2021 which was negative.    Menstrual History:  Menstrual History 3/16/2012 2016 2016   LAST MENSTRUAL PERIOD 2004 -   Menarche Age - - 13   Period Cycle (Days) - - no period for 12 years then had one 2016   Period Duration (Days) - - 5   Period Pattern - - Irregular   Menstrual Flow - - Light   Dysmenorrhea - - Mild   PMS Symptoms - - Cramping   Reviewed Today - - Yes       Last    Lab Results   Component Value Date    PAP NIL 2018     History of abnormal Pap smear: NO - age 30-65 PAP every 5 years with negative HPV co-testing recommended    Last   Lab Results   Component Value Date    HPV16 Negative 2018     Last   Lab Results   Component Value Date    HPV18 Negative 2018     Last   Lab Results   Component Value Date    HRHPV Negative 2018       Mammogram current: yes    HISTORY:  Prescription Medications as of 2021       Rx Number Disp Refills Start End Last Dispensed Date Next Fill Date Owning Pharmacy    Calcium Carbonate-Vit D-Min (CALCIUM 1200 PO)            Sig: Take 1 tablet by mouth daily Reported on 3/23/2017    Class: Historical     Route: Oral    clonazePAM (KLONOPIN) 0.5 MG tablet  5 tablet 0 2020    [id number] [place], [place] - [address]    Sig: Take 1 tablet (0.5 mg) by mouth nightly as needed for anxiety    Class: E-Prescribe    Route: Oral    Multiple Vitamins-Minerals (MULTIVITAMIN & MINERAL PO)            Sig: Take  by mouth.    Class: Historical    Route: Oral    SUMAtriptan (IMITREX) 50 MG tablet  18 tablet 3 2020    [facility] [place], [place] - [address]    Sig: Take 1 tablet (50 mg) by mouth at onset of headache for migraine May repeat in 2 hours. Max 4 tablets/24 hours.    Class: E-Prescribe    Route: Oral    tretinoin (RETIN-A) 0.025 % external cream  45 g 11 2019    [facility] [place] [address]    Sig: Apply a pea-size amount to entire face nightly at bedtime.    Class: E-Prescribe        No Known Allergies  Immunization History   Administered Date(s) Administered     HepA-Adult 10/26/2017     Influenza (IIV3) PF 2012, 10/27/2013     Influenza Vaccine IM > 6 months Valent IIV4 10/26/2017     MMR 2013       OB History    Para Term  AB Living   2 0 0 0 2 0   SAB TAB Ectopic Multiple Live Births   0 0 0 0 0     Past Medical History:   Diagnosis Date     Amenorrhea     hypothalamic     Anxiety      Back pain     normal MRI 09     Basal cell carcinoma      Chondromalacia of right patella 08    MRI otherwise normal     Insomnia      Migraines      Osteopenia     T score -1.9 hip and back 11     Vasomotor rhinitis     uses nasalcort     Past Surgical History:   Procedure Laterality Date     basal cell ca excision       BIOPSY  2012    skin: moles and basal cell asmt     DENTAL SURGERY  2020    Dental implant     MOHS MICROGRAPHIC PROCEDURE       Family History   Problem Relation Age of Onset     C.A.D. Maternal Grandmother      CChiragA.D. Maternal Grandfather      Breast Cancer Mother      Osteoporosis Mother       Bipolar Disorder Father         suicide at age 42     Depression Brother         Suicide     Cancer No family hx of         skin cancer     Melanoma No family hx of      Skin Cancer No family hx of      Social History     Socioeconomic History     Marital status:      Spouse name: None     Number of children: None     Years of education: None     Highest education level: None   Occupational History     None   Social Needs     Financial resource strain: None     Food insecurity     Worry: None     Inability: None     Transportation needs     Medical: None     Non-medical: None   Tobacco Use     Smoking status: Former Smoker     Packs/day: 0.20     Years: 5.00     Pack years: 1.00     Types: Cigarettes     Quit date: 1999     Years since quittin.8     Smokeless tobacco: Never Used   Substance and Sexual Activity     Alcohol use: No     Drug use: No     Sexual activity: Not Currently     Partners: Male     Birth control/protection: None   Lifestyle     Physical activity     Days per week: None     Minutes per session: None     Stress: None   Relationships     Social connections     Talks on phone: None     Gets together: None     Attends Scientology service: None     Active member of club or organization: None     Attends meetings of clubs or organizations: None     Relationship status: None     Intimate partner violence     Fear of current or ex partner: None     Emotionally abused: None     Physically abused: None     Forced sexual activity: None   Other Topics Concern      Service Not Asked     Blood Transfusions Not Asked     Caffeine Concern Not Asked     Occupational Exposure Not Asked     Hobby Hazards Not Asked     Sleep Concern Not Asked     Stress Concern Not Asked     Weight Concern Not Asked     Special Diet Not Asked     Back Care Not Asked     Exercise Yes     Comment: runner, yoga, strength trains     Bike Helmet Not Asked     Seat Belt Not Asked     Self-Exams Not Asked      "Parent/sibling w/ CABG, MI or angioplasty before 65F 55M? Not Asked   Social History Narrative    How much exercise per week? daily    How much calcium per day? supplements       How much caffeine per day? 2 tea    How much vitamin D per day? In supplements    Do you/your family wear seatbelts?  Yes    Do you/your family use safety helmets? Yes    Do you/your family use sunscreen? Yes    Do you/your family keep firearms in the home? No    Do you/your family have a smoke detector(s)? Yes        Do you feel safe in your home? Yes    Has anyone ever touched you in an unwanted manner? No     Explain reviewed [facility] 8-       EXAM:  Blood pressure 104/67, pulse 59, height 1.626 m (5' 4\"), weight 49 kg (108 lb), not currently breastfeeding. Body mass index is 18.54 kg/m .  General appearance: Pleasant female in no acute distress.     BREAST EXAM:  Breast: Without visible skin changes. No dimpling or lesions seen.   Breasts supple, non-tender with palpation, no dominant mass, nodularity, or nipple discharge noted bilaterally. Axillary nodes negative.      PELVIC EXAM:  EG/BUS: Normal genital architecture without lesions, erythema or abnormal secretions   Urethral meatus: normal  Urethra: no masses, tenderness, or scarring  Bladder: no masses or tenderness  Vagina: moist, pink, rugae with creamy, white and odorless  secretions  Cervix: no lesions  Uterus: small, smooth, firm, mobile w/o pain  Adnexa: Within normal limits and No masses, nodularity, tenderness  Rectum:anus normal       ASSESSMENT:  Encounter Diagnosis   Name Primary?     Encounter for gynecological examination without abnormal finding       50 year old Female Pelvic and Breast Exam wnl.    PLAN:   Orders Placed This Encounter   Procedures     Pelvic and Breast Exam Procedure []     Edema & SOB  - Discussed pt should establish care with a PCP to discuss this further. No emergent concerns. Recommend Dr. Miles or Dr. Solitario in our clinic    Abnormal " Uterine Bleeding  - Thin endometrial lining on US with sporadic periods. Likely perimenopausal. Discussed pt should let us know if she has any heavy bleeding or spotting between periods. CTM.    Return in one year/PRN for preventive care or problems/concerns. FIT testing due in February.     Verbalized understanding and agreement with visit plan.    Yen Bruner MD  OB/GYN Resident, PGY-1    I, Kay Buck, saw this patient with the resident and agree with the resident's findings and plan of care as documented in the resident's note.   Kay Buck MD     Answers for HPI/ROS submitted by the patient on 7/5/2021   SANTA 7 TOTAL SCORE: 0

## 2021-07-08 NOTE — LETTER
2021       RE: Kay Ceja  [address]  [facility] [place] [address]     Dear Colleague,    Thank you for referring your patient, Kay Ceja, to the [facility] [place] [facility]. Please see a copy of my visit note below.    Progress Note    SUBJECTIVE:  Kay Ceja is an 50 year old  , who requests a breast and pelvic exam.    Patient reports that she is overall doing well. She is excited about an upcoming trip to [facility] this summer. Pt's only complaint today is that she has been having some minor swelling in her feet and feels she becomes SOB more easily with exercise over the past 1.5 years. She does not currently have a primary care doctor.     Pt reports her last period was in April of this year and was very light. She thinks this may be her last period. Denies any hot flashes or vaginal dryness. Pt previously was amenorrheic for periods of less than a year and had return of menses, had USx2 with endometrial thickness of 4.4 and 6 mm.    Pt is getting her mammogram today. She last had FIT testing in 2021 which was negative.    Menstrual History:  Menstrual History 3/16/2012 2016 2016   LAST MENSTRUAL PERIOD 2004 -   Menarche Age - - 13   Period Cycle (Days) - - no period for 12 years then had one 2016   Period Duration (Days) - - 5   Period Pattern - - Irregular   Menstrual Flow - - Light   Dysmenorrhea - - Mild   PMS Symptoms - - Cramping   Reviewed Today - - Yes       Last    Lab Results   Component Value Date    PAP NIL 2018     History of abnormal Pap smear: NO - age 30-65 PAP every 5 years with negative HPV co-testing recommended    Last   Lab Results   Component Value Date    HPV16 Negative 2018     Last   Lab Results   Component Value Date    HPV18 Negative 2018     Last   Lab Results   Component Value Date    HRHPV Negative 2018        Mammogram current: yes    HISTORY:  Prescription Medications as of 2021       Rx Number Disp Refills Start End Last Dispensed Date Next Fill Date Owning Pharmacy    Calcium Carbonate-Vit D-Min (CALCIUM 1200 PO)            Sig: Take 1 tablet by mouth daily Reported on 3/23/2017    Class: Historical    Route: Oral    clonazePAM (KLONOPIN) 0.5 MG tablet  5 tablet 0 2020    [facility] [id number] - [place], [place] - [address]    Sig: Take 1 tablet (0.5 mg) by mouth nightly as needed for anxiety    Class: E-Prescribe    Route: Oral    Multiple Vitamins-Minerals (MULTIVITAMIN & MINERAL PO)            Sig: Take  by mouth.    Class: Historical    Route: Oral    SUMAtriptan (IMITREX) 50 MG tablet  18 tablet 3 2020    [facility] [id number] [facility] - [place], [place] - [address]    Sig: Take 1 tablet (50 mg) by mouth at onset of headache for migraine May repeat in 2 hours. Max 4 tablets/24 hours.    Class: E-Prescribe    Route: Oral    tretinoin (RETIN-A) 0.025 % external cream  45 g 11 2019    [facility] [place], [place] [facility] [address] [facility]    Sig: Apply a pea-size amount to entire face nightly at bedtime.    Class: E-Prescribe        No Known Allergies  Immunization History   Administered Date(s) Administered     HepA-Adult 10/26/2017     Influenza (IIV3) PF 2012, 10/27/2013     Influenza Vaccine IM > 6 months Valent IIV4 10/26/2017     MMR 2013       OB History    Para Term  AB Living   2 0 0 0 2 0   SAB TAB Ectopic Multiple Live Births   0 0 0 0 0     Past Medical History:   Diagnosis Date     Amenorrhea     hypothalamic     Anxiety      Back pain     normal MRI 09     Basal cell carcinoma      Chondromalacia of right patella 08    MRI otherwise normal     Insomnia      Migraines      Osteopenia     T score -1.9 hip and back 11     Vasomotor rhinitis     uses nasalcort     Past Surgical History:   Procedure Laterality Date     basal cell ca  excision       BIOPSY  2012    skin: moles and basal cell asmt     DENTAL SURGERY  2020    Dental implant     MOHS MICROGRAPHIC PROCEDURE       Family History   Problem Relation Age of Onset     EVELIN Maternal Grandmother      EVELIN Maternal Grandfather      Breast Cancer Mother      Osteoporosis Mother      Bipolar Disorder Father         suicide at age 42     Depression Brother         Suicide     Cancer No family hx of         skin cancer     Melanoma No family hx of      Skin Cancer No family hx of      Social History     Socioeconomic History     Marital status:      Spouse name: None     Number of children: None     Years of education: None     Highest education level: None   Occupational History     None   Social Needs     Financial resource strain: None     Food insecurity     Worry: None     Inability: None     Transportation needs     Medical: None     Non-medical: None   Tobacco Use     Smoking status: Former Smoker     Packs/day: 0.20     Years: 5.00     Pack years: 1.00     Types: Cigarettes     Quit date: 1999     Years since quittin.8     Smokeless tobacco: Never Used   Substance and Sexual Activity     Alcohol use: No     Drug use: No     Sexual activity: Not Currently     Partners: Male     Birth control/protection: None   Lifestyle     Physical activity     Days per week: None     Minutes per session: None     Stress: None   Relationships     Social connections     Talks on phone: None     Gets together: None     Attends Sabianist service: None     Active member of club or organization: None     Attends meetings of clubs or organizations: None     Relationship status: None     Intimate partner violence     Fear of current or ex partner: None     Emotionally abused: None     Physically abused: None     Forced sexual activity: None   Other Topics Concern      Service Not Asked     Blood Transfusions Not Asked     Caffeine Concern Not Asked     Occupational Exposure  "Not Asked     Hobby Hazards Not Asked     Sleep Concern Not Asked     Stress Concern Not Asked     Weight Concern Not Asked     Special Diet Not Asked     Back Care Not Asked     Exercise Yes     Comment: runner, yoga, strength trains     Bike Helmet Not Asked     Seat Belt Not Asked     Self-Exams Not Asked     Parent/sibling w/ CABG, MI or angioplasty before 65F 55M? Not Asked   Social History Narrative    How much exercise per week? daily    How much calcium per day? supplements       How much caffeine per day? 2 tea    How much vitamin D per day? In supplements    Do you/your family wear seatbelts?  Yes    Do you/your family use safety helmets? Yes    Do you/your family use sunscreen? Yes    Do you/your family keep firearms in the home? No    Do you/your family have a smoke detector(s)? Yes        Do you feel safe in your home? Yes    Has anyone ever touched you in an unwanted manner? No     Explain reviewed [facility] 8-       EXAM:  Blood pressure 104/67, pulse 59, height 1.626 m (5' 4\"), weight 49 kg (108 lb), not currently breastfeeding. Body mass index is 18.54 kg/m .  General appearance: Pleasant female in no acute distress.     BREAST EXAM:  Breast: Without visible skin changes. No dimpling or lesions seen.   Breasts supple, non-tender with palpation, no dominant mass, nodularity, or nipple discharge noted bilaterally. Axillary nodes negative.      PELVIC EXAM:  EG/BUS: Normal genital architecture without lesions, erythema or abnormal secretions   Urethral meatus: normal  Urethra: no masses, tenderness, or scarring  Bladder: no masses or tenderness  Vagina: moist, pink, rugae with creamy, white and odorless  secretions  Cervix: no lesions  Uterus: small, smooth, firm, mobile w/o pain  Adnexa: Within normal limits and No masses, nodularity, tenderness  Rectum:anus normal       ASSESSMENT:  Encounter Diagnosis   Name Primary?     Encounter for gynecological examination without abnormal finding       50 " year old Female Pelvic and Breast Exam wnl.    PLAN:   Orders Placed This Encounter   Procedures     Pelvic and Breast Exam Procedure []     Edema & SOB  - Discussed pt should establish care with a PCP to discuss this further. No emergent concerns. Recommend Dr. Miles or Dr. Solitario in our clinic    Abnormal Uterine Bleeding  - Thin endometrial lining on US with sporadic periods. Likely perimenopausal. Discussed pt should let us know if she has any heavy bleeding or spotting between periods. CTM.    Return in one year/PRN for preventive care or problems/concerns. FIT testing due in February.     Verbalized understanding and agreement with visit plan.    Yen Bruner MD  OB/GYN Resident, PGY-1    I, Kay Buck, saw this patient with the resident and agree with the resident's findings and plan of care as documented in the resident's note.   Kay Buck MD     Answers for HPI/ROS submitted by the patient on 7/5/2021   SANTA 7 TOTAL SCORE: 0

## 2021-07-08 NOTE — LETTER
July 8, 2021        Kay RUSSELL Marcial  [address] [place] [id number]    To Whom it May Concern:    Kay Ceja was seen in our office on 7/8/2021.  Patient has low blood pressure and dizziness.  Please accommodate request for no office mat.    Sincerely,        Kay Buck MD

## 2021-08-26 ENCOUNTER — TELEPHONE (OUTPATIENT)
Dept: DERMATOLOGY | Facility: CLINIC | Age: 51
End: 2021-08-26

## 2021-08-26 NOTE — TELEPHONE ENCOUNTER
[facility] Call Center    Phone Message    May a detailed message be left on voicemail: yes     Reason for Call: Other: Pt returning missed call from Alexia Samuels regarding scheduling with [place]. Belle [place] [facility] [place] and a 3 month follow up. Please call Pt back on 8/27/21 after 9:30AM per Pt.     Thank you.     Action Taken: Message routed to:  [facility] Derm    Travel Screening: Not Applicable

## 2021-08-27 NOTE — TELEPHONE ENCOUNTER
[facility] Call Center    Phone Message    May a detailed message be left on voicemail: no - Please message Pt with MyC to schedule Mole Mapping appt    Reason for Call: Appointment Intake    Referring Provider Name:   Multiple providers    Diagnosis and/or Symptoms:   Mole Mapping    Action Taken: Message routed to:  [facility] ([facility]): Derm    Travel Screening: Not Applicable     Pt wants to schedule Mole Mapping. Pt would like to communicate with clinic by MyC to schedule. Please message Pt to schedule appt.

## 2021-08-30 NOTE — TELEPHONE ENCOUNTER
Message sent to patient per request.    Me  to Kay Ceja    8/30/21 11:11 AM  Dr. Yoshi Chatman is not currently doing mole mapping for non melanoma patients. It does not look like you qualify at this time. Please call [phone] if you are wanting to discuss a concern with one of our dermatologists.     Thank you,  Patricia Cadet CMA on 8/30/2021 at 11:11 AM    Patricia Cadet CMA on 8/30/2021 at 11:12 AM

## 2021-10-02 ENCOUNTER — HEALTH MAINTENANCE LETTER (OUTPATIENT)
Age: 51
End: 2021-10-02

## 2021-10-11 ENCOUNTER — HOSPITAL ENCOUNTER (OUTPATIENT)
Dept: GENERAL RADIOLOGY | Facility: CLINIC | Age: 51
End: 2021-10-11
Attending: FAMILY MEDICINE
Payer: COMMERCIAL

## 2021-10-11 ENCOUNTER — HOSPITAL ENCOUNTER (INPATIENT)
Facility: CLINIC | Age: 51
LOS: 5 days | Discharge: HOME | DRG: 200 | End: 2021-10-16
Attending: EMERGENCY MEDICINE | Admitting: SURGERY
Payer: COMMERCIAL

## 2021-10-11 ENCOUNTER — OFFICE VISIT (OUTPATIENT)
Dept: FAMILY MEDICINE | Facility: CLINIC | Age: 51
End: 2021-10-11
Attending: FAMILY MEDICINE
Payer: COMMERCIAL

## 2021-10-11 ENCOUNTER — APPOINTMENT (OUTPATIENT)
Dept: GENERAL RADIOLOGY | Facility: CLINIC | Age: 51
DRG: 200 | End: 2021-10-11
Attending: EMERGENCY MEDICINE
Payer: COMMERCIAL

## 2021-10-11 VITALS
WEIGHT: 107.6 LBS | HEART RATE: 60 BPM | DIASTOLIC BLOOD PRESSURE: 77 MMHG | BODY MASS INDEX: 18.37 KG/M2 | SYSTOLIC BLOOD PRESSURE: 113 MMHG | HEIGHT: 64 IN

## 2021-10-11 DIAGNOSIS — S22.41XA CLOSED FRACTURE OF MULTIPLE RIBS OF RIGHT SIDE, INITIAL ENCOUNTER: ICD-10-CM
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
## 2021-10-11 DIAGNOSIS — Z20.822 CONTACT WITH AND (SUSPECTED) EXPOSURE TO COVID-19: ICD-10-CM

## 2021-10-11 DIAGNOSIS — L98.9 BUMPS ON SKIN: ICD-10-CM

## 2021-10-11 DIAGNOSIS — R60.0 LOWER LEG EDEMA: ICD-10-CM

## 2021-10-11 DIAGNOSIS — R07.1 PAINFUL BREATHING: ICD-10-CM

## 2021-10-11 DIAGNOSIS — S27.0XXA TRAUMATIC PNEUMOTHORAX, INITIAL ENCOUNTER: ICD-10-CM

## 2021-10-11 DIAGNOSIS — S20.211A CONTUSION OF RIGHT CHEST WALL, INITIAL ENCOUNTER: Primary | ICD-10-CM

## 2021-10-11 DIAGNOSIS — G43.009 MIGRAINE WITHOUT AURA AND WITHOUT STATUS MIGRAINOSUS, NOT INTRACTABLE: ICD-10-CM

## 2021-10-11 DIAGNOSIS — M95.8 WINGED SCAPULA OF RIGHT SIDE: ICD-10-CM

## 2021-10-11 LAB
ANION GAP SERPL CALCULATED.3IONS-SCNC: 2 MMOL/L (ref 3–14)
APTT PPP: 26 SECONDS (ref 22–38)
BASOPHILS # BLD AUTO: 0 10E3/UL (ref 0–0.2)
BASOPHILS NFR BLD AUTO: 0 %
BUN SERPL-MCNC: 11 MG/DL (ref 7–30)
CALCIUM SERPL-MCNC: 9.4 MG/DL (ref 8.5–10.1)
CHLORIDE BLD-SCNC: 103 MMOL/L (ref 94–109)
CO2 SERPL-SCNC: 32 MMOL/L (ref 20–32)
CREAT SERPL-MCNC: 0.61 MG/DL (ref 0.52–1.04)
EOSINOPHIL # BLD AUTO: 0.1 10E3/UL (ref 0–0.7)
EOSINOPHIL NFR BLD AUTO: 2 %
ERYTHROCYTE [DISTWIDTH] IN BLOOD BY AUTOMATED COUNT: 13.8 % (ref 10–15)
GFR SERPL CREATININE-BSD FRML MDRD: >90 ML/MIN/1.73M2
GLUCOSE BLD-MCNC: 97 MG/DL (ref 70–99)
HCT VFR BLD AUTO: 40.4 % (ref 35–47)
HGB BLD-MCNC: 13 G/DL (ref 11.7–15.7)
HOLD SPECIMEN: NORMAL
IMM GRANULOCYTES # BLD: 0 10E3/UL
IMM GRANULOCYTES NFR BLD: 0 %
INR PPP: 0.88 (ref 0.86–1.14)
LYMPHOCYTES # BLD AUTO: 1.4 10E3/UL (ref 0.8–5.3)
LYMPHOCYTES NFR BLD AUTO: 24 %
MCH RBC QN AUTO: 30.7 PG (ref 26.5–33)
MCHC RBC AUTO-ENTMCNC: 32.2 G/DL (ref 31.5–36.5)
MCV RBC AUTO: 96 FL (ref 78–100)
MONOCYTES # BLD AUTO: 0.3 10E3/UL (ref 0–1.3)
MONOCYTES NFR BLD AUTO: 5 %
NEUTROPHILS # BLD AUTO: 4.1 10E3/UL (ref 1.6–8.3)
NEUTROPHILS NFR BLD AUTO: 69 %
NRBC # BLD AUTO: 0 10E3/UL
NRBC BLD AUTO-RTO: 0 /100
PLATELET # BLD AUTO: 228 10E3/UL (ref 150–450)
POTASSIUM BLD-SCNC: 3.8 MMOL/L (ref 3.4–5.3)
RADIOLOGIST FLAGS: ABNORMAL
RBC # BLD AUTO: 4.23 10E6/UL (ref 3.8–5.2)
SARS-COV-2 RNA RESP QL NAA+PROBE: NEGATIVE
SODIUM SERPL-SCNC: 137 MMOL/L (ref 133–144)
WBC # BLD AUTO: 5.9 10E3/UL (ref 4–11)

## 2021-10-11 PROCEDURE — 250N000013 HC RX MED GY IP 250 OP 250 PS 637: Performed by: EMERGENCY MEDICINE

## 2021-10-11 PROCEDURE — 120N000002 HC R&B MED SURG/OB UMMC

## 2021-10-11 PROCEDURE — 250N000009 HC RX 250: Performed by: EMERGENCY MEDICINE

## 2021-10-11 PROCEDURE — G0463 HOSPITAL OUTPT CLINIC VISIT: HCPCS | Mod: 25

## 2021-10-11 PROCEDURE — 96376 TX/PRO/DX INJ SAME DRUG ADON: CPT | Mod: 59 | Performed by: EMERGENCY MEDICINE

## 2021-10-11 PROCEDURE — 99291 CRITICAL CARE FIRST HOUR: CPT | Mod: 25 | Performed by: EMERGENCY MEDICINE

## 2021-10-11 PROCEDURE — 96375 TX/PRO/DX INJ NEW DRUG ADDON: CPT | Mod: 59 | Performed by: EMERGENCY MEDICINE

## 2021-10-11 PROCEDURE — 32551 INSERTION OF CHEST TUBE: CPT | Performed by: EMERGENCY MEDICINE

## 2021-10-11 PROCEDURE — 96374 THER/PROPH/DIAG INJ IV PUSH: CPT | Mod: 59 | Performed by: EMERGENCY MEDICINE

## 2021-10-11 PROCEDURE — 85004 AUTOMATED DIFF WBC COUNT: CPT | Performed by: EMERGENCY MEDICINE

## 2021-10-11 PROCEDURE — 71046 X-RAY EXAM CHEST 2 VIEWS: CPT | Mod: 26 | Performed by: RADIOLOGY

## 2021-10-11 PROCEDURE — 73610 X-RAY EXAM OF ANKLE: CPT | Mod: RT

## 2021-10-11 PROCEDURE — 250N000013 HC RX MED GY IP 250 OP 250 PS 637: Performed by: STUDENT IN AN ORGANIZED HEALTH CARE EDUCATION/TRAINING PROGRAM

## 2021-10-11 PROCEDURE — 85730 THROMBOPLASTIN TIME PARTIAL: CPT | Performed by: EMERGENCY MEDICINE

## 2021-10-11 PROCEDURE — 73610 X-RAY EXAM OF ANKLE: CPT | Mod: 26 | Performed by: RADIOLOGY

## 2021-10-11 PROCEDURE — 99204 OFFICE O/P NEW MOD 45 MIN: CPT | Performed by: FAMILY MEDICINE

## 2021-10-11 PROCEDURE — 250N000011 HC RX IP 250 OP 636: Performed by: FAMILY MEDICINE

## 2021-10-11 PROCEDURE — 71046 X-RAY EXAM CHEST 2 VIEWS: CPT

## 2021-10-11 PROCEDURE — 71045 X-RAY EXAM CHEST 1 VIEW: CPT

## 2021-10-11 PROCEDURE — 250N000011 HC RX IP 250 OP 636: Performed by: EMERGENCY MEDICINE

## 2021-10-11 PROCEDURE — 80048 BASIC METABOLIC PNL TOTAL CA: CPT | Performed by: EMERGENCY MEDICINE

## 2021-10-11 PROCEDURE — 258N000003 HC RX IP 258 OP 636

## 2021-10-11 PROCEDURE — 36415 COLL VENOUS BLD VENIPUNCTURE: CPT | Performed by: EMERGENCY MEDICINE

## 2021-10-11 PROCEDURE — C9803 HOPD COVID-19 SPEC COLLECT: HCPCS | Performed by: EMERGENCY MEDICINE

## 2021-10-11 PROCEDURE — U0003 INFECTIOUS AGENT DETECTION BY NUCLEIC ACID (DNA OR RNA); SEVERE ACUTE RESPIRATORY SYNDROME CORONAVIRUS 2 (SARS-COV-2) (CORONAVIRUS DISEASE [COVID-19]), AMPLIFIED PROBE TECHNIQUE, MAKING USE OF HIGH THROUGHPUT TECHNOLOGIES AS DESCRIBED BY CMS-2020-01-R: HCPCS | Performed by: EMERGENCY MEDICINE

## 2021-10-11 PROCEDURE — 99285 EMERGENCY DEPT VISIT HI MDM: CPT | Mod: 25 | Performed by: EMERGENCY MEDICINE

## 2021-10-11 PROCEDURE — 0W9930Z DRAINAGE OF RIGHT PLEURAL CAVITY WITH DRAINAGE DEVICE, PERCUTANEOUS APPROACH: ICD-10-PCS | Performed by: EMERGENCY MEDICINE

## 2021-10-11 PROCEDURE — 85610 PROTHROMBIN TIME: CPT | Performed by: EMERGENCY MEDICINE

## 2021-10-11 RX ORDER — ONDANSETRON 2 MG/ML
4 INJECTION INTRAMUSCULAR; INTRAVENOUS EVERY 6 HOURS PRN
Status: DISCONTINUED | OUTPATIENT
Start: 2021-10-11 | End: 2021-10-12

## 2021-10-11 RX ORDER — LIDOCAINE 4 G/G
1 PATCH TOPICAL
Status: DISCONTINUED | OUTPATIENT
Start: 2021-10-11 | End: 2021-10-16 | Stop reason: HOSPADM

## 2021-10-11 RX ORDER — POLYETHYLENE GLYCOL 3350 17 G/17G
17 POWDER, FOR SOLUTION ORAL DAILY
Status: DISCONTINUED | OUTPATIENT
Start: 2021-10-12 | End: 2021-10-16 | Stop reason: HOSPADM

## 2021-10-11 RX ORDER — SODIUM CHLORIDE 9 MG/ML
INJECTION, SOLUTION INTRAVENOUS
Status: COMPLETED
Start: 2021-10-11 | End: 2021-10-11

## 2021-10-11 RX ORDER — SUMATRIPTAN 50 MG/1
50 TABLET, FILM COATED ORAL
Qty: 18 TABLET | Refills: 3 | Status: SHIPPED | OUTPATIENT
Start: 2021-10-11 | End: 2022-05-02

## 2021-10-11 RX ORDER — ONDANSETRON 4 MG/1
4 TABLET, ORALLY DISINTEGRATING ORAL EVERY 6 HOURS PRN
Status: DISCONTINUED | OUTPATIENT
Start: 2021-10-11 | End: 2021-10-12

## 2021-10-11 RX ORDER — HYDROMORPHONE HYDROCHLORIDE 1 MG/ML
0.5 INJECTION, SOLUTION INTRAMUSCULAR; INTRAVENOUS; SUBCUTANEOUS ONCE
Status: COMPLETED | OUTPATIENT
Start: 2021-10-11 | End: 2021-10-11

## 2021-10-11 RX ORDER — AMOXICILLIN 250 MG
2 CAPSULE ORAL 2 TIMES DAILY
Status: DISCONTINUED | OUTPATIENT
Start: 2021-10-11 | End: 2021-10-16 | Stop reason: HOSPADM

## 2021-10-11 RX ORDER — ACETAMINOPHEN 500 MG
1000 TABLET ORAL ONCE
Status: COMPLETED | OUTPATIENT
Start: 2021-10-11 | End: 2021-10-11

## 2021-10-11 RX ORDER — HYDROMORPHONE HCL IN WATER/PF 6 MG/30 ML
0.2 PATIENT CONTROLLED ANALGESIA SYRINGE INTRAVENOUS
Status: DISCONTINUED | OUTPATIENT
Start: 2021-10-11 | End: 2021-10-16 | Stop reason: HOSPADM

## 2021-10-11 RX ORDER — OXYCODONE HYDROCHLORIDE 5 MG/1
5-10 TABLET ORAL
Status: DISCONTINUED | OUTPATIENT
Start: 2021-10-11 | End: 2021-10-12

## 2021-10-11 RX ORDER — IBUPROFEN 200 MG
600 TABLET ORAL EVERY 8 HOURS PRN
COMMUNITY
End: 2024-01-19

## 2021-10-11 RX ORDER — METHOCARBAMOL 750 MG/1
750 TABLET, FILM COATED ORAL 3 TIMES DAILY
Status: DISCONTINUED | OUTPATIENT
Start: 2021-10-11 | End: 2021-10-16 | Stop reason: HOSPADM

## 2021-10-11 RX ORDER — ACETAMINOPHEN 325 MG/1
975 TABLET ORAL 3 TIMES DAILY
Status: DISCONTINUED | OUTPATIENT
Start: 2021-10-12 | End: 2021-10-16

## 2021-10-11 RX ORDER — NALOXONE HYDROCHLORIDE 0.4 MG/ML
0.2 INJECTION, SOLUTION INTRAMUSCULAR; INTRAVENOUS; SUBCUTANEOUS
Status: DISCONTINUED | OUTPATIENT
Start: 2021-10-11 | End: 2021-10-16 | Stop reason: HOSPADM

## 2021-10-11 RX ORDER — NALOXONE HYDROCHLORIDE 0.4 MG/ML
0.4 INJECTION, SOLUTION INTRAMUSCULAR; INTRAVENOUS; SUBCUTANEOUS
Status: DISCONTINUED | OUTPATIENT
Start: 2021-10-11 | End: 2021-10-16 | Stop reason: HOSPADM

## 2021-10-11 RX ORDER — LIDOCAINE HYDROCHLORIDE AND EPINEPHRINE 10; 10 MG/ML; UG/ML
10 INJECTION, SOLUTION INFILTRATION; PERINEURAL ONCE
Status: COMPLETED | OUTPATIENT
Start: 2021-10-11 | End: 2021-10-11

## 2021-10-11 RX ADMIN — LIDOCAINE 1 PATCH: 246 PATCH TOPICAL at 23:59

## 2021-10-11 RX ADMIN — HYDROMORPHONE HYDROCHLORIDE 0.5 MG: 1 INJECTION, SOLUTION INTRAMUSCULAR; INTRAVENOUS; SUBCUTANEOUS at 20:15

## 2021-10-11 RX ADMIN — ACETAMINOPHEN 1000 MG: 500 TABLET ORAL at 17:51

## 2021-10-11 RX ADMIN — METHOCARBAMOL 750 MG: 750 TABLET ORAL at 23:59

## 2021-10-11 RX ADMIN — LIDOCAINE HYDROCHLORIDE,EPINEPHRINE BITARTRATE 10 ML: 10; .01 INJECTION, SOLUTION INFILTRATION; PERINEURAL at 15:53

## 2021-10-11 RX ADMIN — MIDAZOLAM HYDROCHLORIDE 1 MG: 1 INJECTION, SOLUTION INTRAMUSCULAR; INTRAVENOUS at 16:25

## 2021-10-11 RX ADMIN — SODIUM CHLORIDE 1000 ML: 9 INJECTION, SOLUTION INTRAVENOUS at 15:51

## 2021-10-11 RX ADMIN — HYDROMORPHONE HYDROCHLORIDE 0.5 MG: 1 INJECTION, SOLUTION INTRAMUSCULAR; INTRAVENOUS; SUBCUTANEOUS at 15:39

## 2021-10-11 RX ADMIN — MIDAZOLAM 1 MG: 1 INJECTION INTRAMUSCULAR; INTRAVENOUS at 16:05

## 2021-10-11 ASSESSMENT — ANXIETY QUESTIONNAIRES
2. NOT BEING ABLE TO STOP OR CONTROL WORRYING: NOT AT ALL
1. FEELING NERVOUS, ANXIOUS, OR ON EDGE: NOT AT ALL
7. FEELING AFRAID AS IF SOMETHING AWFUL MIGHT HAPPEN: NOT AT ALL
GAD7 TOTAL SCORE: 3
1. FEELING NERVOUS, ANXIOUS, OR ON EDGE: SEVERAL DAYS
3. WORRYING TOO MUCH ABOUT DIFFERENT THINGS: NOT AT ALL
7. FEELING AFRAID AS IF SOMETHING AWFUL MIGHT HAPPEN: NOT AT ALL
2. NOT BEING ABLE TO STOP OR CONTROL WORRYING: NOT AT ALL
8. IF YOU CHECKED OFF ANY PROBLEMS, HOW DIFFICULT HAVE THESE MADE IT FOR YOU TO DO YOUR WORK, TAKE CARE OF THINGS AT HOME, OR GET ALONG WITH OTHER PEOPLE?: NOT DIFFICULT AT ALL
6. BECOMING EASILY ANNOYED OR IRRITABLE: NOT AT ALL
GAD7 TOTAL SCORE: 2
4. TROUBLE RELAXING: SEVERAL DAYS
5. BEING SO RESTLESS THAT IT IS HARD TO SIT STILL: SEVERAL DAYS
6. BECOMING EASILY ANNOYED OR IRRITABLE: NOT AT ALL
3. WORRYING TOO MUCH ABOUT DIFFERENT THINGS: NOT AT ALL
GAD7 TOTAL SCORE: 2
GAD7 TOTAL SCORE: 2
5. BEING SO RESTLESS THAT IT IS HARD TO SIT STILL: SEVERAL DAYS
7. FEELING AFRAID AS IF SOMETHING AWFUL MIGHT HAPPEN: NOT AT ALL

## 2021-10-11 ASSESSMENT — PATIENT HEALTH QUESTIONNAIRE - PHQ9
5. POOR APPETITE OR OVEREATING: SEVERAL DAYS
SUM OF ALL RESPONSES TO PHQ QUESTIONS 1-9: 0

## 2021-10-11 ASSESSMENT — ENCOUNTER SYMPTOMS: SHORTNESS OF BREATH: 1

## 2021-10-11 ASSESSMENT — MIFFLIN-ST. JEOR
SCORE: 1079.9
SCORE: 1089.42
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
## 2021-10-11 NOTE — PATIENT INSTRUCTIONS
Chest xray today  Right ankle xray today  If ankle edema gets persistent let me know  Return for an intake exam in Dec/Emiliano
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
## 2021-10-12 ENCOUNTER — APPOINTMENT (OUTPATIENT)
Dept: GENERAL RADIOLOGY | Facility: CLINIC | Age: 51
DRG: 200 | End: 2021-10-12
Attending: SURGERY
Payer: COMMERCIAL

## 2021-10-12 ENCOUNTER — APPOINTMENT (OUTPATIENT)
Dept: INTERVENTIONAL RADIOLOGY/VASCULAR | Facility: CLINIC | Age: 51
DRG: 200 | End: 2021-10-12
Attending: PHYSICIAN ASSISTANT
Payer: COMMERCIAL

## 2021-10-12 LAB
ANION GAP SERPL CALCULATED.3IONS-SCNC: 7 MMOL/L (ref 3–14)
BUN SERPL-MCNC: 10 MG/DL (ref 7–30)
CALCIUM SERPL-MCNC: 9 MG/DL (ref 8.5–10.1)
CHLORIDE BLD-SCNC: 107 MMOL/L (ref 94–109)
CO2 SERPL-SCNC: 23 MMOL/L (ref 20–32)
CREAT SERPL-MCNC: 0.62 MG/DL (ref 0.52–1.04)
ERYTHROCYTE [DISTWIDTH] IN BLOOD BY AUTOMATED COUNT: 13.8 % (ref 10–15)
GFR SERPL CREATININE-BSD FRML MDRD: >90 ML/MIN/1.73M2
GLUCOSE BLD-MCNC: 113 MG/DL (ref 70–99)
HCT VFR BLD AUTO: 36.8 % (ref 35–47)
HGB BLD-MCNC: 11.7 G/DL (ref 11.7–15.7)
HOLD SPECIMEN: NORMAL
MAGNESIUM SERPL-MCNC: 1.9 MG/DL (ref 1.6–2.3)
MCH RBC QN AUTO: 31.2 PG (ref 26.5–33)
MCHC RBC AUTO-ENTMCNC: 31.8 G/DL (ref 31.5–36.5)
MCV RBC AUTO: 98 FL (ref 78–100)
PHOSPHATE SERPL-MCNC: 4 MG/DL (ref 2.5–4.5)
PLATELET # BLD AUTO: 201 10E3/UL (ref 150–450)
POTASSIUM BLD-SCNC: 3.7 MMOL/L (ref 3.4–5.3)
RADIOLOGIST FLAGS: ABNORMAL
RBC # BLD AUTO: 3.75 10E6/UL (ref 3.8–5.2)
SODIUM SERPL-SCNC: 137 MMOL/L (ref 133–144)
WBC # BLD AUTO: 7.9 10E3/UL (ref 4–11)

## 2021-10-12 PROCEDURE — 71045 X-RAY EXAM CHEST 1 VIEW: CPT | Mod: 26 | Performed by: RADIOLOGY

## 2021-10-12 PROCEDURE — 99152 MOD SED SAME PHYS/QHP 5/>YRS: CPT | Performed by: PHYSICIAN ASSISTANT

## 2021-10-12 PROCEDURE — 250N000013 HC RX MED GY IP 250 OP 250 PS 637: Performed by: NURSE PRACTITIONER

## 2021-10-12 PROCEDURE — 32557 INSERT CATH PLEURA W/ IMAGE: CPT

## 2021-10-12 PROCEDURE — 99153 MOD SED SAME PHYS/QHP EA: CPT

## 2021-10-12 PROCEDURE — 99232 SBSQ HOSP IP/OBS MODERATE 35: CPT | Mod: 25 | Performed by: NURSE PRACTITIONER

## 2021-10-12 PROCEDURE — 80048 BASIC METABOLIC PNL TOTAL CA: CPT | Performed by: STUDENT IN AN ORGANIZED HEALTH CARE EDUCATION/TRAINING PROGRAM

## 2021-10-12 PROCEDURE — C1769 GUIDE WIRE: HCPCS

## 2021-10-12 PROCEDURE — 71045 X-RAY EXAM CHEST 1 VIEW: CPT

## 2021-10-12 PROCEDURE — 99152 MOD SED SAME PHYS/QHP 5/>YRS: CPT

## 2021-10-12 PROCEDURE — 32557 INSERT CATH PLEURA W/ IMAGE: CPT | Mod: RT | Performed by: PHYSICIAN ASSISTANT

## 2021-10-12 PROCEDURE — 250N000011 HC RX IP 250 OP 636: Performed by: NURSE PRACTITIONER

## 2021-10-12 PROCEDURE — 272N000500 HC NEEDLE CR2

## 2021-10-12 PROCEDURE — 36415 COLL VENOUS BLD VENIPUNCTURE: CPT | Performed by: STUDENT IN AN ORGANIZED HEALTH CARE EDUCATION/TRAINING PROGRAM

## 2021-10-12 PROCEDURE — 84100 ASSAY OF PHOSPHORUS: CPT | Performed by: STUDENT IN AN ORGANIZED HEALTH CARE EDUCATION/TRAINING PROGRAM

## 2021-10-12 PROCEDURE — 250N000011 HC RX IP 250 OP 636: Performed by: STUDENT IN AN ORGANIZED HEALTH CARE EDUCATION/TRAINING PROGRAM

## 2021-10-12 PROCEDURE — 120N000002 HC R&B MED SURG/OB UMMC

## 2021-10-12 PROCEDURE — 85027 COMPLETE CBC AUTOMATED: CPT | Performed by: STUDENT IN AN ORGANIZED HEALTH CARE EDUCATION/TRAINING PROGRAM

## 2021-10-12 PROCEDURE — 250N000013 HC RX MED GY IP 250 OP 250 PS 637: Performed by: STUDENT IN AN ORGANIZED HEALTH CARE EDUCATION/TRAINING PROGRAM

## 2021-10-12 PROCEDURE — 83735 ASSAY OF MAGNESIUM: CPT | Performed by: STUDENT IN AN ORGANIZED HEALTH CARE EDUCATION/TRAINING PROGRAM

## 2021-10-12 PROCEDURE — C1729 CATH, DRAINAGE: HCPCS

## 2021-10-12 PROCEDURE — 0W9930Z DRAINAGE OF RIGHT PLEURAL CAVITY WITH DRAINAGE DEVICE, PERCUTANEOUS APPROACH: ICD-10-PCS | Performed by: PHYSICIAN ASSISTANT

## 2021-10-12 PROCEDURE — 250N000011 HC RX IP 250 OP 636: Performed by: RADIOLOGY

## 2021-10-12 PROCEDURE — 258N000003 HC RX IP 258 OP 636: Performed by: NURSE PRACTITIONER

## 2021-10-12 PROCEDURE — 272N000196 HC ACCESSORY CR5

## 2021-10-12 RX ORDER — NALOXONE HYDROCHLORIDE 0.4 MG/ML
0.2 INJECTION, SOLUTION INTRAMUSCULAR; INTRAVENOUS; SUBCUTANEOUS
Status: DISCONTINUED | OUTPATIENT
Start: 2021-10-12 | End: 2021-10-12 | Stop reason: HOSPADM

## 2021-10-12 RX ORDER — DEXTROSE MONOHYDRATE, SODIUM CHLORIDE, AND POTASSIUM CHLORIDE 50; 1.49; 4.5 G/1000ML; G/1000ML; G/1000ML
INJECTION, SOLUTION INTRAVENOUS CONTINUOUS
Status: DISCONTINUED | OUTPATIENT
Start: 2021-10-12 | End: 2021-10-13

## 2021-10-12 RX ORDER — FLUMAZENIL 0.1 MG/ML
0.2 INJECTION, SOLUTION INTRAVENOUS
Status: DISCONTINUED | OUTPATIENT
Start: 2021-10-12 | End: 2021-10-12 | Stop reason: HOSPADM

## 2021-10-12 RX ORDER — FENTANYL CITRATE 50 UG/ML
25-50 INJECTION, SOLUTION INTRAMUSCULAR; INTRAVENOUS EVERY 5 MIN PRN
Status: DISCONTINUED | OUTPATIENT
Start: 2021-10-12 | End: 2021-10-12 | Stop reason: HOSPADM

## 2021-10-12 RX ORDER — IBUPROFEN 600 MG/1
600 TABLET, FILM COATED ORAL EVERY 8 HOURS PRN
Status: DISCONTINUED | OUTPATIENT
Start: 2021-10-12 | End: 2021-10-16 | Stop reason: HOSPADM

## 2021-10-12 RX ORDER — NALOXONE HYDROCHLORIDE 0.4 MG/ML
0.4 INJECTION, SOLUTION INTRAMUSCULAR; INTRAVENOUS; SUBCUTANEOUS
Status: DISCONTINUED | OUTPATIENT
Start: 2021-10-12 | End: 2021-10-12 | Stop reason: HOSPADM

## 2021-10-12 RX ORDER — ONDANSETRON 2 MG/ML
4-8 INJECTION INTRAMUSCULAR; INTRAVENOUS EVERY 6 HOURS PRN
Status: DISCONTINUED | OUTPATIENT
Start: 2021-10-12 | End: 2021-10-16 | Stop reason: HOSPADM

## 2021-10-12 RX ORDER — SUMATRIPTAN 50 MG/1
50 TABLET, FILM COATED ORAL
Status: DISCONTINUED | OUTPATIENT
Start: 2021-10-12 | End: 2021-10-16 | Stop reason: HOSPADM

## 2021-10-12 RX ORDER — ONDANSETRON 4 MG/1
4-8 TABLET, ORALLY DISINTEGRATING ORAL EVERY 6 HOURS PRN
Status: DISCONTINUED | OUTPATIENT
Start: 2021-10-12 | End: 2021-10-16 | Stop reason: HOSPADM

## 2021-10-12 RX ORDER — KETOROLAC TROMETHAMINE 15 MG/ML
15 INJECTION, SOLUTION INTRAMUSCULAR; INTRAVENOUS EVERY 6 HOURS
Status: DISCONTINUED | OUTPATIENT
Start: 2021-10-12 | End: 2021-10-14

## 2021-10-12 RX ADMIN — FENTANYL CITRATE 25 MCG: 50 INJECTION INTRAMUSCULAR; INTRAVENOUS at 12:21

## 2021-10-12 RX ADMIN — SUMATRIPTAN SUCCINATE 50 MG: 50 TABLET, FILM COATED ORAL at 15:12

## 2021-10-12 RX ADMIN — MIDAZOLAM 0.5 MG: 1 INJECTION INTRAMUSCULAR; INTRAVENOUS at 12:21

## 2021-10-12 RX ADMIN — METHOCARBAMOL 750 MG: 750 TABLET ORAL at 19:57

## 2021-10-12 RX ADMIN — DOCUSATE SODIUM 50 MG AND SENNOSIDES 8.6 MG 2 TABLET: 8.6; 5 TABLET, FILM COATED ORAL at 08:32

## 2021-10-12 RX ADMIN — FENTANYL CITRATE 50 MCG: 50 INJECTION INTRAMUSCULAR; INTRAVENOUS at 11:52

## 2021-10-12 RX ADMIN — ONDANSETRON 4 MG: 2 INJECTION INTRAMUSCULAR; INTRAVENOUS at 06:33

## 2021-10-12 RX ADMIN — KETOROLAC TROMETHAMINE 15 MG: 15 INJECTION, SOLUTION INTRAMUSCULAR; INTRAVENOUS at 16:00

## 2021-10-12 RX ADMIN — IBUPROFEN 600 MG: 600 TABLET ORAL at 10:20

## 2021-10-12 RX ADMIN — ONDANSETRON 4 MG: 2 INJECTION INTRAMUSCULAR; INTRAVENOUS at 10:54

## 2021-10-12 RX ADMIN — ONDANSETRON 4 MG: 2 INJECTION INTRAMUSCULAR; INTRAVENOUS at 00:07

## 2021-10-12 RX ADMIN — HYDROMORPHONE HYDROCHLORIDE 0.2 MG: 0.2 INJECTION, SOLUTION INTRAMUSCULAR; INTRAVENOUS; SUBCUTANEOUS at 02:21

## 2021-10-12 RX ADMIN — KETOROLAC TROMETHAMINE 15 MG: 15 INJECTION, SOLUTION INTRAMUSCULAR; INTRAVENOUS at 22:21

## 2021-10-12 RX ADMIN — METHOCARBAMOL 750 MG: 750 TABLET ORAL at 13:14

## 2021-10-12 RX ADMIN — HYDROMORPHONE HYDROCHLORIDE 0.2 MG: 0.2 INJECTION, SOLUTION INTRAMUSCULAR; INTRAVENOUS; SUBCUTANEOUS at 06:34

## 2021-10-12 RX ADMIN — POTASSIUM CHLORIDE, DEXTROSE MONOHYDRATE AND SODIUM CHLORIDE: 150; 5; 450 INJECTION, SOLUTION INTRAVENOUS at 16:27

## 2021-10-12 RX ADMIN — MIDAZOLAM 1 MG: 1 INJECTION INTRAMUSCULAR; INTRAVENOUS at 11:52

## 2021-10-12 RX ADMIN — ACETAMINOPHEN 975 MG: 325 TABLET, FILM COATED ORAL at 19:57

## 2021-10-12 RX ADMIN — METHOCARBAMOL 750 MG: 750 TABLET ORAL at 08:32

## 2021-10-12 RX ADMIN — SUMATRIPTAN SUCCINATE 50 MG: 50 TABLET, FILM COATED ORAL at 10:20

## 2021-10-12 RX ADMIN — DOCUSATE SODIUM 50 MG AND SENNOSIDES 8.6 MG 2 TABLET: 8.6; 5 TABLET, FILM COATED ORAL at 19:57

## 2021-10-12 ASSESSMENT — ACTIVITIES OF DAILY LIVING (ADL)
ADLS_ACUITY_SCORE: 14
ADLS_ACUITY_SCORE: 14
ADLS_ACUITY_SCORE: 12
ADLS_ACUITY_SCORE: 14
ADLS_ACUITY_SCORE: 5

## 2021-10-12 NOTE — PROGRESS NOTES
[facility]  Trauma Service Progress Note    Date of Service (when I saw the patient): 10/12/2021     Assessment & Plan   Trauma mechanism:Fall  Time/date of injury:10/09/2021  Known Injuries:  1. Right Pneumothorax  2. Right anterior lateral 5th, 6th and 7th rib fractures, more visible on plain film today    Neuro/Pain:  # Acute traumatic pain   Scheduled Toradol, tylenol and Lidoderm patches  PRN Hydromorphone (potentially contributing to nausea), PRN Oxycodone    Pulmonary:  # Traumatic rib fractures 5th -7th  # Large right pneumothorax  # Small right effusion  Right chest tube placed in ED yesterday, post placement film obtained with full lung expansion, follow up CXR obtained today with mod right pneumothorax, possibly dislodged, +airleak.  -IR consulted, new right pigtail chest tube placed  - Leave both tubes to -20sxn  - Pulmonary hygiene  - Daily CXR's  - Supplemental oxygen to keep saturation above 92 %.  Incentive spirometer while awake     GI/Nutrition:    Regular diet  PRN antiemetics available    Fluids/Electrolytes:  MIVF for hydration  - electrolyte replacement protocol  In place.     Infectious disease:     -No indications for antibiotics.     Hematology:    - Hemoglobin 11.7. Monitor and trend. Threshold for transfusion if hgb less than 7.0 or signs/symptoms of hypoperfusion.       Musculoskeletal:  - Physical and occupational therapy consults.    Skin:  #  - dilgent cares to prevent skin breakdown and wound formation.      Lines/ tubes/ drains:  - PIV  - Right chest tubes x2    General Cares:    PPI/H2 blocker:  n/a   DVT prophylaxis: mechanical    Bowel Regimen/Date of last stool: PTA   Pulmonary toilet: IS   ETOH screen completed yes   Lines / drains: Chest tube to suction    Code status:  Full code     Discharge goals:     Adequate pain management: ongoing    VSS x24 hours: yes    Hemoglobin stable x 48 hours: monitor    Ambulating safely and/or  therapy evals complete: pending    Drains/lines removed or plan in place to manage: yes    Teaching done: ongoing    Other:  Expected D/C date: 2-3 days    Interval History   Nauseated, headache, right shoulder scapula pain. Denies abdominal pain. +shortness of breath, improved with placement of chest tube  ROS x 8 negative with exception of those things listed in interval hx    Physical Exam   Temp: 97.1  F (36.2  C) Temp src: Oral BP: 105/58 Pulse: 55   Resp: 16 SpO2: 97 % O2 Device: None (Room air)    Vitals:    10/11/21 1447   Weight: 49.4 kg (109 lb)     Vital Signs with Ranges  Temp:  [97.1  F (36.2  C)-97.8  F (36.6  C)] 97.1  F (36.2  C)  Pulse:  [53-60] 55  Resp:  [16] 16  BP: (105-125)/(41-84) 105/58  SpO2:  [92 %-100 %] 97 %  I/O last 3 completed shifts:  In: -   Out: 450 [Urine:50; Emesis/NG output:400]    [place] Coma Scale - Total 15/15  Eye Response (E): 4   4= spontaneous, 3= to verbal/voice, 2= to pain, 1= No response   Verbal Response (V): 5   5= Orientated, converses, 4= Confused, converses, 3= Inappropriate words, 2= Incomprehensible sounds, 1=No response   Motor Response (M): 6   6= Obeys commands, 5= Localizes to pain, 4= Withdrawal to pain, 3=Fexion to pain, 2= Extension to pain, 1= No response   Constitutional: Awake, alert, cooperative, no apparent distress.  Eyes: PERRL  ENT: Normocephalic, atraumatic  Respiratory: No increased work of breathing, good air exchange, clear to auscultation bilaterally, no crackles or wheezing.  Cardiovascular:  regular rate and rhythm, normal S1 and S2,  and no murmur.   GI: Normal bowel sounds, abdomen soft, non-distended, non-tender, no guarding  Genitourinary:  Voids spont, not seen  Skin:  Warm and dry  Musculoskeletal: There is no redness, warmth, or swelling of the joints.  Pedal pulse palpated.  Neurologic: Awake, alert, oriented. Strength and sensory is intact. No focal deficits.  Neuropsychiatric: Calm, normal eye contact, alert, affect appropriate to  situation, oriented, thought process normal.    KESHIA Espinal CNP  To contact the trauma service use job code pager [phone],   Numeric texts or alpha text through [facility]

## 2021-10-12 NOTE — PROCEDURES
[facility]    Procedure: IR Procedure Note    Date/Time: 10/12/2021 12:35 PM  Performed by: Paula Quinonez PA-C  Authorized by: Paula Quinonez PA-C   IR Fellow Physician:  Other(s) attending procedure: Saloni Pérez MD    UNIVERSAL PROTOCOL   Site Marked: NA  Prior Images Obtained and Reviewed:  Yes  Required items: Required blood products, implants, devices and special equipment available    Patient identity confirmed:  Verbally with patient, arm band, provided demographic data and hospital-assigned identification number  Patient was reevaluated immediately before administering moderate or deep sedation or anesthesia  Confirmation Checklist:  Patient's identity using two indicators, relevant allergies, procedure was appropriate and matched the consent or emergent situation and correct equipment/implants were available  Time out: Immediately prior to the procedure a time out was called    Universal Protocol: the Joint Commission Universal Protocol was followed    Preparation: Patient was prepped and draped in usual sterile fashion           ANESTHESIA    Anesthesia: Local infiltration  Local Anesthetic:  Lidocaine 1% without epinephrine      SEDATION    Patient Sedated: Yes    Sedation:  Fentanyl and midazolam  Vital signs: Vital signs monitored during sedation    See dictated procedure note for full details.  Findings: Moderate sedation    Specimens: none    Complications: None    Condition: Stable    PROCEDURE   Patient Tolerance:  Patient tolerated the procedure well with no immediate complications  Describe Procedure: Right 12 Fr nonlocking pigtail catheter placed as apical chest tube. Immediate return of air. Placed to suction. Ready for use.   Length of time physician/provider present for 1:1 monitoring during sedation: 30

## 2021-10-12 NOTE — CONSULTS
Interventional Radiology Inpatient Consult Service Note    Patient to be seen URGENT within 4 hours     Reason for Consult large pneumothorax, reposition right  pleural chest tube     Interventional Radiology Adult/Peds IP Consult: Patient to be seen: URGENT within 4 hours; Call back #: Ed Dunne Trauma PARDEEP [phone]; large pneumothorax, reposition right  pleural chest tube; Requesting provider? Other supervising provider; Name: ... [[phone]]    Electronically signed by: Ed Dunne APRN CNP on 10/12/21 0890  Call back # Ed Dunne Trauma PARDEEP [phone]     Is the patient on an anticoagulant ? No   Is the patient currently NPO ? Yes     INR - 0.88  Plts - 201  COVID - neg 10/11  AC - none    Hx -  [  Kay Ceja is a 51 year old female with hx of depression/anxiety who presented here with right sided pneumothorax after a fall. .... Chest placed WB ED 10/11/21  ]    CXR 10/12/21  RESIDENT PRELIMINARY INTERPRETATION  Impression:   1. Interval retraction of the right chest tube with interval increased  subcutaneous gas along the chest wall and increased size of right  apical pneumothorax, now small-to-moderate. Sidehole appears to still  project over the pleural space.    XR CHEST 2 VW  10/11/2021   HISTORY:  Painful breathing  IMPRESSION:  1. Large right pneumothorax without tension component. Small right  pleural effusion.  2. Partially collapsed right lung. Left lung is clear.        ===    Approved and appointed procedure:  Pérez; TODAY 10/12, additional RIGHT chest tube placement.    Pre-procedure orders will be entered by IR. No NPO required.    Medications to be held include:  none    Please contact the IR charge RN at [phone] for estimated time of procedure.

## 2021-10-12 NOTE — PLAN OF CARE
"/63 (BP Location: Left arm)   Pulse 53   Temp 97.5  F (36.4  C) (Oral)   Resp 16   Ht 1.626 m (5' 4\")   Wt 49.4 kg (109 lb)   SpO2 92%   Breastfeeding No   BMI 18.71 kg/m      Patient transferred during evening shift from [facility] with chest tube.  [id number]  Status: Traumatic pneumothorax  Activity: Up  with AX 1  Neuros: A&Ox4,calm & cooperative.  Cardiac: WDL, denies chest pain.  Respiratory: WDL on RA, denies SOB.  GI/: +BS, LBM 10/11, voiding  Diet:  clears. c/o nausea. Zofran x 1 given, emesis of 400 this am  Skin/Incisions: WDL.  Lines/Drains: R PIV SL. Chest Tube to suction with 0  Pain/Nausea: Pain managed with PRN dilaudid,scheduled robaxin, nausea managed with PRN zofran.  New Changes: No acute changes this shift.  Plan: Appears to rest between cares.Continue with POC   "

## 2021-10-12 NOTE — PLAN OF CARE
Admitted/transferred from: [facility] ED.  2 RN full   skin assessment completed by Laura Evans, NABIL and Rodney Macias RN.  Skin assessment finding: issues found abrasion to right eye, lip, left third toe and elbow. Right chest tube UTV insertion site.   Interventions/actions: other dressing reinforced     Will continue to monitor.

## 2021-10-12 NOTE — PLAN OF CARE
"Blood pressure 118/70, pulse 62, temperature 98  F (36.7  C), temperature source Oral, resp. rate 12, height 1.626 m (5' 4\"), weight 49.4 kg (109 lb), SpO2 100 %, not currently breastfeeding.      Pt is alert and oriented by 4. Moderate complaints of pain, new order for toradol IV helps pt with relief. Had new chest tube placed due to increased pneumothorax-- pt now has two chest tubes on the right side-- both on -20 suction, with very little to none output. Had some nausea during the day but relief came with pain being controlled. SBA with all transfers due to pt needed help with chest tubes and cords. New D5 w/ NACL & 20 k+ --  started via R PIV. Able to make needs known. Continue with plan of care.  "

## 2021-10-12 NOTE — IR NOTE
Patient Name: Kay Ceja  Medical Record Number: [id number]  Today's Date: 10/12/2021    Procedure: right chest tube placement  Proceduralist: DRAKE Quinonez PA-C, KAMINI Han PA-C    Procedure Start: 1150  Procedure end: 1240  Sedation medications administered: versed  2 Mg., fentanyl 100  Mcg.    Report given to:  RN    Other Notes: Pt arrived to IR room 2   from . Consent reviewed. Pt denies any questions or concerns regarding procedure. Pt positioned supine  and monitored per protocol. Pt tolerated procedure without any noted complications. Pt transferred back to .

## 2021-10-12 NOTE — PRE-PROCEDURE
GENERAL PRE-PROCEDURE:   Procedure:  Chest tube placement  Date/Time:  10/12/2021 11:38 AM    Verbal consent obtained?: Yes    Written consent obtained?: Yes    Risks and benefits: Risks, benefits and alternatives were discussed    Consent given by:  Patient  Patient states understanding of procedure being performed: Yes    Patient's understanding of procedure matches consent: Yes    Procedure consent matches procedure scheduled: Yes    Expected level of sedation:  Moderate  Appropriately NPO:  Yes  ASA Class:  2  Mallampati  :  Grade 2- soft palate, base of uvula, tonsillar pillars, and portion of posterior pharyngeal wall visible  Lungs:  Lungs clear with good breath sounds bilaterally  Heart:  Normal heart sounds and rate  History & Physical reviewed:  History and physical reviewed and no updates needed  Statement of review:  I have reviewed the lab findings, diagnostic data, medications, and the plan for sedation
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
## 2021-10-13 ENCOUNTER — APPOINTMENT (OUTPATIENT)
Dept: CT IMAGING | Facility: CLINIC | Age: 51
DRG: 200 | End: 2021-10-13
Attending: NURSE PRACTITIONER
Payer: COMMERCIAL

## 2021-10-13 ENCOUNTER — APPOINTMENT (OUTPATIENT)
Dept: GENERAL RADIOLOGY | Facility: CLINIC | Age: 51
DRG: 200 | End: 2021-10-13
Attending: SURGERY
Payer: COMMERCIAL

## 2021-10-13 PROCEDURE — 99232 SBSQ HOSP IP/OBS MODERATE 35: CPT | Performed by: NURSE PRACTITIONER

## 2021-10-13 PROCEDURE — 70486 CT MAXILLOFACIAL W/O DYE: CPT | Mod: 26 | Performed by: RADIOLOGY

## 2021-10-13 PROCEDURE — 94150 VITAL CAPACITY TEST: CPT

## 2021-10-13 PROCEDURE — 999N000157 HC STATISTIC RCP TIME EA 10 MIN

## 2021-10-13 PROCEDURE — 70450 CT HEAD/BRAIN W/O DYE: CPT | Mod: 26 | Performed by: RADIOLOGY

## 2021-10-13 PROCEDURE — 258N000003 HC RX IP 258 OP 636: Performed by: NURSE PRACTITIONER

## 2021-10-13 PROCEDURE — 71045 X-RAY EXAM CHEST 1 VIEW: CPT

## 2021-10-13 PROCEDURE — 71045 X-RAY EXAM CHEST 1 VIEW: CPT | Mod: 26 | Performed by: RADIOLOGY

## 2021-10-13 PROCEDURE — 250N000013 HC RX MED GY IP 250 OP 250 PS 637: Performed by: STUDENT IN AN ORGANIZED HEALTH CARE EDUCATION/TRAINING PROGRAM

## 2021-10-13 PROCEDURE — 70450 CT HEAD/BRAIN W/O DYE: CPT

## 2021-10-13 PROCEDURE — 120N000002 HC R&B MED SURG/OB UMMC

## 2021-10-13 PROCEDURE — 250N000011 HC RX IP 250 OP 636: Performed by: NURSE PRACTITIONER

## 2021-10-13 PROCEDURE — 70486 CT MAXILLOFACIAL W/O DYE: CPT

## 2021-10-13 RX ADMIN — KETOROLAC TROMETHAMINE 15 MG: 15 INJECTION, SOLUTION INTRAMUSCULAR; INTRAVENOUS at 22:04

## 2021-10-13 RX ADMIN — KETOROLAC TROMETHAMINE 15 MG: 15 INJECTION, SOLUTION INTRAMUSCULAR; INTRAVENOUS at 16:28

## 2021-10-13 RX ADMIN — METHOCARBAMOL 750 MG: 750 TABLET ORAL at 13:41

## 2021-10-13 RX ADMIN — METHOCARBAMOL 750 MG: 750 TABLET ORAL at 20:19

## 2021-10-13 RX ADMIN — DOCUSATE SODIUM 50 MG AND SENNOSIDES 8.6 MG 2 TABLET: 8.6; 5 TABLET, FILM COATED ORAL at 20:19

## 2021-10-13 RX ADMIN — POTASSIUM CHLORIDE, DEXTROSE MONOHYDRATE AND SODIUM CHLORIDE: 150; 5; 450 INJECTION, SOLUTION INTRAVENOUS at 05:34

## 2021-10-13 RX ADMIN — DOCUSATE SODIUM 50 MG AND SENNOSIDES 8.6 MG 1 TABLET: 8.6; 5 TABLET, FILM COATED ORAL at 09:24

## 2021-10-13 RX ADMIN — KETOROLAC TROMETHAMINE 15 MG: 15 INJECTION, SOLUTION INTRAMUSCULAR; INTRAVENOUS at 04:12

## 2021-10-13 RX ADMIN — METHOCARBAMOL 750 MG: 750 TABLET ORAL at 09:22

## 2021-10-13 RX ADMIN — KETOROLAC TROMETHAMINE 15 MG: 15 INJECTION, SOLUTION INTRAMUSCULAR; INTRAVENOUS at 09:41

## 2021-10-13 ASSESSMENT — ACTIVITIES OF DAILY LIVING (ADL)
ADLS_ACUITY_SCORE: 5

## 2021-10-13 NOTE — PLAN OF CARE
Patient reports good pain relief with Toradol/Robaxin. Attained head CT per orders.Tolerated food/fluids.Voiding,no stool.Laxative given.Denies dyspnea,no apparent SOB with activity.Continue to monitor,CT X 2 -20cm each when in room.

## 2021-10-13 NOTE — PROGRESS NOTES
[facility]  Trauma Service Progress Note    Date of Service (when I saw the patient): 10/13/2021     Assessment & Plan   Trauma mechanism:Fall  Time/date of injury:10/09/2021  Known Injuries:  1. Right Pneumothorax  2. Right anterior lateral 5th, 6th and 7th rib fractures    Obtain a CT head and facial bones today with facial pain and persistent headache post fall      Neuro/Pain:  # Acute traumatic pain   Scheduled Toradol, tylenol and Lidoderm patches  PRN Hydromorphone (potentially contributing to nausea), PRN Oxycodone     Pulmonary:  # Traumatic rib fractures 5th -7th  # Large right pneumothorax  # Small right effusion  Right chest tube placed in ED yesterday, post placement film obtained with full lung expansion, follow up CXR 10/12 am with larger PTX, moderate air leak. Pigtail chest tube placed by IR.   - Small residual PTX on CXR today with small air leak. Minimal chest drain output  - Leave both tubes to -20sxn, Ok to ambulate off suction  - Pulmonary hygiene  - Daily CXR's  - Supplemental oxygen to keep saturation above 92 %.  - Incentive spirometer while awake      GI/Nutrition:    Regular diet  PRN antiemetics available     Fluids/Electrolytes:  SL MIVF  - electrolyte replacement protocol  In place.      Infectious disease:   -No indications for antibiotics.      Hematology:    - Hemoglobin 11.7. and stable.  - Threshold for transfusion if hgb less than 7.0 or signs/symptoms of hypoperfusion.        Musculoskeletal:  - Physical and occupational therapy consults.     Skin:  - dilgent cares to prevent skin breakdown and wound formation.       Lines/ tubes/ drains:  - PIV  - Right chest tubes x2     General Cares:               PPI/H2 blocker:  n/a              DVT prophylaxis: Lovenox              Bowel Regimen/Date of last stool: PTA, bowel regimen ordered              Pulmonary toilet: IS              ETOH screen completed yes              Lines / drains:  Chest tube to suction     Code status:  Full code              Discharge goals:     Adequate pain management: yes    VSS x24 hours: yes    Hemoglobin stable x 48 hours: n/a    Ambulating safely and/or therapy evals complete: ambulates    Drains/lines removed or plan in place to manage: yes    Teaching done: ongoing    Other:  Expected D/C date: pending resolution of pneumothorax 2-3 days    Interval History   Tolerable pain with the initiation of toradol. Reports headache, intermittent nausea, right upper teeth painful  ROS x 8 negative with exception of those things listed in interval hx    Physical Exam   Temp: 97.4  F (36.3  C) Temp src: Oral BP: 118/64 Pulse: 60   Resp: 18 SpO2: 96 % O2 Device: None (Room air) Oxygen Delivery: 4 LPM  Vitals:    10/11/21 1447   Weight: 49.4 kg (109 lb)     Vital Signs with Ranges  Temp:  [97.4  F (36.3  C)-98  F (36.7  C)] 97.4  F (36.3  C)  Pulse:  [50-64] 60  Resp:  [12-18] 18  BP: (104-118)/(55-73) 118/64  SpO2:  [96 %-100 %] 96 %  I/O last 3 completed shifts:  In: 1247.5 [P.O.:240; I.V.:1007.5]  Out: 5 [Chest Tube:5]    [place] Coma Scale - Total 15/15  Eye Response (E): 4   4= spontaneous, 3= to verbal/voice, 2= to pain, 1= No response   Verbal Response (V): 5   5= Orientated, converses, 4= Confused, converses, 3= Inappropriate words, 2= Incomprehensible sounds, 1=No response   Motor Response (M): 6   6= Obeys commands, 5= Localizes to pain, 4= Withdrawal to pain, 3=Fexion to pain, 2= Extension to pain, 1= No response   Constitutional: Awake, alert, cooperative, no apparent distress.  Eyes: PERRL  ENT: Normocephalic  Respiratory: Clear bilaterally without wheezes, small +air leak  Cardiovascular:  regular rate and rhythm, normal S1 and S2,  and no murmur.   GI: Normal bowel sounds, abdomen soft, non-distended, non-tender, no guarding  Genitourinary:  Voids spont, not seen  Skin:  Warm and dry  Musculoskeletal: There is no redness, warmth, or swelling of the joints.  Pedal  pulse palpated.  Neurologic: Awake, alert, oriented. Strength and sensory is intact. No focal deficits.  Neuropsychiatric: Calm, normal eye contact, alert, affect appropriate to situation, oriented, thought process normal.    KESHIA Espinal CNP  To contact the trauma service use job code pager [phone],   Numeric texts or alpha text through [facility]

## 2021-10-13 NOTE — PLAN OF CARE
"/62 (BP Location: Left arm)   Pulse 58   Temp 97.7  F (36.5  C) (Oral)   Resp 18   Ht 1.626 m (5' 4\")   Wt 49.4 kg (109 lb)   SpO2 97%   Breastfeeding No   BMI 18.71 kg/m      Neuros: A&Ox4. Denies any numbness/tingling.     Cardiac: WDL. Denies any chest pain.     Respiratory: WDL. Denies SOB. On RA.     GI/Gu: Voiding. No BM this shift. Denies any N/V.     Skin/Incisions: CTx2 to -20 suction. Minimal output- Dressings CDI.  PIV infusing with D5- 1/2NS and 20 KCL. @ 75 mL.    Pain: Better with Toradol.     Diet: Regular diet.    Activity: SBA- needs help with CT's and IV.     Plan: Continue with POC.     "

## 2021-10-14 ENCOUNTER — APPOINTMENT (OUTPATIENT)
Dept: GENERAL RADIOLOGY | Facility: CLINIC | Age: 51
DRG: 200 | End: 2021-10-14
Attending: PHYSICIAN ASSISTANT
Payer: COMMERCIAL

## 2021-10-14 ENCOUNTER — APPOINTMENT (OUTPATIENT)
Dept: GENERAL RADIOLOGY | Facility: CLINIC | Age: 51
DRG: 200 | End: 2021-10-14
Attending: SURGERY
Payer: COMMERCIAL

## 2021-10-14 PROCEDURE — 120N000002 HC R&B MED SURG/OB UMMC

## 2021-10-14 PROCEDURE — 71045 X-RAY EXAM CHEST 1 VIEW: CPT | Mod: 77

## 2021-10-14 PROCEDURE — 71045 X-RAY EXAM CHEST 1 VIEW: CPT | Mod: 26 | Performed by: RADIOLOGY

## 2021-10-14 PROCEDURE — 94150 VITAL CAPACITY TEST: CPT

## 2021-10-14 PROCEDURE — 250N000013 HC RX MED GY IP 250 OP 250 PS 637: Performed by: STUDENT IN AN ORGANIZED HEALTH CARE EDUCATION/TRAINING PROGRAM

## 2021-10-14 PROCEDURE — 250N000011 HC RX IP 250 OP 636: Performed by: NURSE PRACTITIONER

## 2021-10-14 PROCEDURE — 71045 X-RAY EXAM CHEST 1 VIEW: CPT

## 2021-10-14 PROCEDURE — 99232 SBSQ HOSP IP/OBS MODERATE 35: CPT | Performed by: PHYSICIAN ASSISTANT

## 2021-10-14 PROCEDURE — 71045 X-RAY EXAM CHEST 1 VIEW: CPT | Mod: 26 | Performed by: STUDENT IN AN ORGANIZED HEALTH CARE EDUCATION/TRAINING PROGRAM

## 2021-10-14 PROCEDURE — 999N000157 HC STATISTIC RCP TIME EA 10 MIN

## 2021-10-14 RX ADMIN — POLYETHYLENE GLYCOL 3350 17 G: 17 POWDER, FOR SOLUTION ORAL at 12:57

## 2021-10-14 RX ADMIN — KETOROLAC TROMETHAMINE 15 MG: 15 INJECTION, SOLUTION INTRAMUSCULAR; INTRAVENOUS at 04:18

## 2021-10-14 RX ADMIN — METHOCARBAMOL 750 MG: 750 TABLET ORAL at 14:04

## 2021-10-14 RX ADMIN — DOCUSATE SODIUM 50 MG AND SENNOSIDES 8.6 MG 2 TABLET: 8.6; 5 TABLET, FILM COATED ORAL at 09:15

## 2021-10-14 RX ADMIN — METHOCARBAMOL 750 MG: 750 TABLET ORAL at 09:13

## 2021-10-14 RX ADMIN — KETOROLAC TROMETHAMINE 15 MG: 15 INJECTION, SOLUTION INTRAMUSCULAR; INTRAVENOUS at 09:46

## 2021-10-14 RX ADMIN — DOCUSATE SODIUM 50 MG AND SENNOSIDES 8.6 MG 2 TABLET: 8.6; 5 TABLET, FILM COATED ORAL at 19:54

## 2021-10-14 RX ADMIN — KETOROLAC TROMETHAMINE 15 MG: 15 INJECTION, SOLUTION INTRAMUSCULAR; INTRAVENOUS at 15:59

## 2021-10-14 RX ADMIN — METHOCARBAMOL 750 MG: 750 TABLET ORAL at 19:54

## 2021-10-14 ASSESSMENT — ACTIVITIES OF DAILY LIVING (ADL)
ADLS_ACUITY_SCORE: 5

## 2021-10-14 NOTE — PLAN OF CARE
"/73 (BP Location: Left arm)   Pulse 69   Temp 97.7  F (36.5  C) (Oral)   Resp 16   Ht 1.626 m (5' 4\")   Wt 49.4 kg (109 lb)   SpO2 98%   Breastfeeding No   BMI 18.71 kg/m      Shift: 7530-9669    Neuro: A&Ox4. Denies any numbness/tingling.   Cardiac: WDL. Denies any chest pain.   Resp: WDL. Denies SOB. On RA. Encouraged IS.   GI/: Voiding. No BM this shift. Denies any N/V.   Skin/Incisions: CTx2 to -20 suction. Minimal output- Dressings CDI.  R PIV SL   Pain: controlled with Toradol; refused Tylenol   Diet: Regular diet. Good appetite.      Activity: SBA- needs help with CTs; ambulated in [place] with      Plan: Continue with POC.      "

## 2021-10-14 NOTE — PROVIDER NOTIFICATION
"/66 (BP Location: Left arm)   Pulse 55   Temp 97.7  F (36.5  C) (Oral)   Resp 20   Ht 1.626 m (5' 4\")   Wt 49.4 kg (109 lb)   SpO2 100%   Breastfeeding No   BMI 18.71 kg/m      Neuros: A&Ox4. Denies any numbness/tingling.      Cardiac: WDL. Denies any chest pain.      Respiratory: WDL. Denies SOB. On RA.      GI/Gu: Voiding. No BM this shift. Denies any N/V.      Skin/Incisions: CTx2 to -20 suction. Minimal output- Dressings CDI.  PIV-SL      Pain: Better with Toradol.      Diet: Regular diet.     Activity: SBA to IND.       Plan: Continue with POC.   "

## 2021-10-14 NOTE — PROGRESS NOTES
[facility]  Trauma Service Progress Note    Date of Service: 10/14/2021    Trauma Mechanism: Fall  Date of Injury: 10/9/21  Known Injuries:  1. Right pneumothorax  2. Right anterior lateral 5th, 6th, 7th rib fractures      Assessment & Plan   Neuro/Pain/Psych:  # Acute Traumatic pain   - Scheduled: Tylenol, Ketorolac, Lidoderm, robaxin,   - Prn: dilaudid, oxycodone   -  Maintain circadian rhythm.  Lights on during the day.  Off at night, minimize cares at night.  OOB during the day.    Pulmonary:  # Traumatic rib fractures 5th -7th  # Large right pneumothorax  # Small right effusion  - Right chest tube placed in ED 10/11. Post placement film obtained with full lung expansion, follow up CXR 10/12 am with larger PTX, moderate air leak. Pigtail chest tube placed by IR.   - Daily CXR's  - Supplemental oxygen to keep saturation above 92 %.  - Incentive spirometer while awake   - CXR today.   1.  Trace right apical pneumothorax, decreased from prior exam.   2.  Right chest tubes in stable position with grossly unchanged  subcutaneous emphysema along the right chest wall.  3.  Bibasilar subsegmental atelectasis.  - Output: minimal   - Transitioned from -20sxn to water seal at 0900. Follow-up xray at 1300. If stable plan on removing 1st CT. Will keep pigtail in place until tomorrow.     Cardiovascular:    - No acute issues     GI/Nutrition:    - Regular Diet     Renal/ Fluids/Electrolytes:  - no acute issues    - electrolyte replacement protocol in place.     Endocrine:  - No management indication.     Infectious disease:   -  No indications for antibiotics.     Hematology:    - Hgb stable.     Musculoskeletal:  - Physical and occupational therapy consults.    Skin:  - dilgent cares to prevent skin breakdown and wound formation.      Lines/ tubes/ drains:  - PIV  - Chest Tube x2    General Cares:               PPI/H2 blocker:  n/a              DVT prophylaxis:  Lovenox              Bowel Regimen/Date of last stool: PTA, bowel regimen ordered              Pulmonary toilet: IS              ETOH screen completed yes              Lines / drains: Chest tube to suction     Code status:  Full code              Discharge goals:     Adequate pain management: yes    VSS x24 hours: yes    Hemoglobin stable x 48 hours: n/a    Ambulating safely and/or therapy evals complete: ambulates    Drains/lines removed or plan in place to manage: yes    Teaching done: ongoing    Other:  Expected D/C date: pending resolution of pneumothorax 1-2 days    Isa Ho PA-C  To contact the trauma service use job code pager [phone],   Numeric texts or alpha text through [facility]     Interval History   ROS x 8 negative with exception of those things listed in interval hx    Physical Exam   Temp: (!) 96.5  F (35.8  C) Temp src: Oral BP: 111/70 Pulse: 66   Resp: 18 SpO2: 100 % O2 Device: None (Room air)    Vitals:    10/11/21 1447   Weight: 49.4 kg (109 lb)     Vital Signs with Ranges  Temp:  [96.5  F (35.8  C)-97.7  F (36.5  C)] 96.5  F (35.8  C)  Pulse:  [55-69] 66  Resp:  [16-20] 18  BP: (102-113)/(49-73) 111/70  SpO2:  [97 %-100 %] 100 %  I/O last 3 completed shifts:  In: 908.75 [P.O.:600; I.V.:308.75]  Out: 5 [Chest Tube:5]    [place] Coma Scale - Total 15/15  Eye Response (E): 4   4= spontaneous, 3= to verbal/voice, 2= to pain, 1= No response   Verbal Response (V): 5   5= Orientated, converses, 4= Confused, converses, 3= Inappropriate words, 2= Incomprehensible sounds, 1=No response   Motor Response (M): 6   6= Obeys commands, 5= Localizes to pain, 4= Withdrawal to pain, 3=Fexion to pain, 2= Extension to pain, 1= No response     Constitutional: Awake, alert, cooperative, no apparent distress. Sitting in chair  Eyes: PERRL  ENT: Normocephalic  Respiratory: Clear bilaterally without wheezes, small +air leak  Cardiovascular:  regular rate and rhythm, normal S1 and S2,  and no murmur.   GI: Normal bowel  sounds, abdomen soft, non-distended, non-tender, no guarding  Genitourinary:  Voids spont, not seen  Skin:  Warm and dry  Musculoskeletal: There is no redness, warmth, or swelling of the joints.   Neurologic: Awake, alert, oriented. Strength and sensory is intact. No focal deficits.  Neuropsychiatric: Calm, normal eye contact, alert, affect appropriate to situation, oriented, thought process normal.

## 2021-10-15 ENCOUNTER — APPOINTMENT (OUTPATIENT)
Dept: GENERAL RADIOLOGY | Facility: CLINIC | Age: 51
DRG: 200 | End: 2021-10-15
Attending: PHYSICIAN ASSISTANT
Payer: COMMERCIAL

## 2021-10-15 PROCEDURE — 999N000157 HC STATISTIC RCP TIME EA 10 MIN

## 2021-10-15 PROCEDURE — 250N000013 HC RX MED GY IP 250 OP 250 PS 637: Performed by: STUDENT IN AN ORGANIZED HEALTH CARE EDUCATION/TRAINING PROGRAM

## 2021-10-15 PROCEDURE — 120N000002 HC R&B MED SURG/OB UMMC

## 2021-10-15 PROCEDURE — 99232 SBSQ HOSP IP/OBS MODERATE 35: CPT | Performed by: PHYSICIAN ASSISTANT

## 2021-10-15 PROCEDURE — 250N000013 HC RX MED GY IP 250 OP 250 PS 637: Performed by: NURSE PRACTITIONER

## 2021-10-15 PROCEDURE — 94150 VITAL CAPACITY TEST: CPT

## 2021-10-15 PROCEDURE — 71045 X-RAY EXAM CHEST 1 VIEW: CPT | Mod: 26 | Performed by: RADIOLOGY

## 2021-10-15 PROCEDURE — 71045 X-RAY EXAM CHEST 1 VIEW: CPT

## 2021-10-15 RX ADMIN — IBUPROFEN 600 MG: 600 TABLET ORAL at 08:00

## 2021-10-15 RX ADMIN — DOCUSATE SODIUM 50 MG AND SENNOSIDES 8.6 MG 2 TABLET: 8.6; 5 TABLET, FILM COATED ORAL at 08:00

## 2021-10-15 RX ADMIN — POLYETHYLENE GLYCOL 3350 17 G: 17 POWDER, FOR SOLUTION ORAL at 14:48

## 2021-10-15 RX ADMIN — METHOCARBAMOL 750 MG: 750 TABLET ORAL at 20:32

## 2021-10-15 RX ADMIN — Medication 1 TABLET: at 08:00

## 2021-10-15 RX ADMIN — IBUPROFEN 600 MG: 600 TABLET ORAL at 23:49

## 2021-10-15 RX ADMIN — IBUPROFEN 600 MG: 600 TABLET ORAL at 16:06

## 2021-10-15 RX ADMIN — METHOCARBAMOL 750 MG: 750 TABLET ORAL at 14:48

## 2021-10-15 RX ADMIN — IBUPROFEN 600 MG: 600 TABLET ORAL at 00:06

## 2021-10-15 RX ADMIN — METHOCARBAMOL 750 MG: 750 TABLET ORAL at 08:00

## 2021-10-15 RX ADMIN — DOCUSATE SODIUM 50 MG AND SENNOSIDES 8.6 MG 2 TABLET: 8.6; 5 TABLET, FILM COATED ORAL at 20:32

## 2021-10-15 ASSESSMENT — ACTIVITIES OF DAILY LIVING (ADL)
ADLS_ACUITY_SCORE: 3
ADLS_ACUITY_SCORE: 5
ADLS_ACUITY_SCORE: 3
ADLS_ACUITY_SCORE: 5
ADLS_ACUITY_SCORE: 5
ADLS_ACUITY_SCORE: 3

## 2021-10-15 NOTE — PROGRESS NOTES
Neuro: A&Ox4. Intact.  Cardiac: WDL. Denies chest pain.   Resp: WDL. Denies SOB. On RA.   GI/: Voiding. Last BM 10/14.  Skin/Incisions: CTx2 to water seal with minimal output. Pigtail CT discontinued in a.m. Minimal output- Dressings CDI.  R PIV SL   Pain: controlled with ibuprofen.  Diet: Regular diet. Good appetite.   Activity: Up and odalis. Ambulating in [place] this Plan: continue with poc

## 2021-10-15 NOTE — PROVIDER NOTIFICATION
This RN paged Trauma pager to report pt request to discontinue IV Toradol and replace with PO ibuprofen.     Dr. Rush returned page, was notified of patient's request. Dr. Rush stated he would make the change to discontinue IV Toradol & replace with PO ibuprofen.    Awaiting orders. Will continue to monitor.     Cordell Mitchell RN

## 2021-10-15 NOTE — PROGRESS NOTES
[facility]  Trauma Service Progress Note    Date of Service: 10/15/2021    Trauma Mechanism: Fall  Date of Injury: 10/9/21  Known Injuries:  1. Right pneumothorax  2. Right anterior lateral 5th, 6th, 7th rib fractures       Assessment & Plan   Neuro/Pain:  # Acute Traumatic pain   - Scheduled: Tylenol, Ketorolac, Lidoderm, robaxin,   - Prn: dilaudid, oxycodone   -  Maintain circadian rhythm.  Lights on during the day.  Off at night, minimize cares at night.  OOB during the day.    Pulmonary:  # Traumatic rib fractures 5th -7th  # Large right pneumothorax  # Small right effusion  - Right chest tube placed in ED 10/11. Post placement film obtained with full lung expansion, follow up CXR 10/12 am with larger PTX, moderate air leak. Pigtail chest tube placed by IR.   - 2nd CXR was not read prior to me leaving yesterday, I had placed the pt back on light suction because the apical pneumothorax looked like it had reestablished. Read says: Minimal increase in trace right apical pneumothorax. Since there is was no large increase at 0830 her pigtail CT was pulled. Other CT was placed to [place].   - Output: minimal   - Follow-up xray at 1130.     Cardiovascular:    - No acute issues      GI/Nutrition:    - Regular Diet      Renal/ Fluids/Electrolytes:  - no acute issues    - electrolyte replacement protocol in place.      Endocrine:  - No management indication.      Infectious disease:   -  No indications for antibiotics.      Hematology:    - Hgb stable.      Musculoskeletal:  - Physical and occupational therapy consults.     Skin:  - dilgent cares to prevent skin breakdown and wound formation.       Lines/ tubes/ drains:  - PIV  - Chest Tube x1    General Cares:               PPI/H2 blocker:  n/a              DVT prophylaxis: Lovenox              Bowel Regimen/Date of last stool: Bowel regimen ordered              Pulmonary toilet: IS              ETOH screen completed  yes              Lines / drains: Chest tube to suction     Code status:  Full code              Discharge goals:     Adequate pain management: yes    VSS x24 hours: yes    Hemoglobin stable x 48 hours: n/a    Ambulating safely and/or therapy evals complete: ambulates    Drains/lines removed or plan in place to manage: yes    Teaching done: ongoing    Other:  Expected D/C date: pending resolution of pneumothorax 1 day     Isa Ho PA-C  To contact the trauma service use job code pager [phone],   Numeric texts or alpha text through [facility]     Interval History   ROS x 8 negative with exception of those things listed in interval hx    Physical Exam   Temp: (!) 96  F (35.6  C) Temp src: Oral BP: 107/66 Pulse: 55   Resp: 20 SpO2: 96 % O2 Device: None (Room air)    Vitals:    10/11/21 1447   Weight: 49.4 kg (109 lb)     Vital Signs with Ranges  Temp:  [96  F (35.6  C)-97.1  F (36.2  C)] 96  F (35.6  C)  Pulse:  [52-60] 55  Resp:  [18-20] 20  BP: (107-116)/(64-72) 107/66  SpO2:  [96 %-100 %] 96 %  I/O last 3 completed shifts:  In: 240 [P.O.:240]  Out: 0     [place] Coma Scale - Total 15/15  Eye Response (E): 4   4= spontaneous, 3= to verbal/voice, 2= to pain, 1= No response   Verbal Response (V): 5   5= Orientated, converses, 4= Confused, converses, 3= Inappropriate words, 2= Incomprehensible sounds, 1=No response   Motor Response (M): 6   6= Obeys commands, 5= Localizes to pain, 4= Withdrawal to pain, 3=Fexion to pain, 2= Extension to pain, 1= No response     Constitutional: Awake, alert, cooperative, no apparent distress. Up brushing teeth when walked into room.   Eyes: PERRL  ENT: Normocephalic  Respiratory: Clear bilaterally without wheezes, small +air leak. Occlusive dressing placed over pigtail chest tube.   Cardiovascular:  regular rate and rhythm, normal S1 and S2,  and no murmur.   GI: Normal bowel sounds, abdomen soft, non-distended, non-tender, no guarding  Genitourinary:  Voids spont, not seen  Skin:  Warm  and dry  Musculoskeletal: There is no redness, warmth, or swelling of the joints.   Neurologic: Awake, alert, oriented. Strength and sensory is intact. No focal deficits.  Neuropsychiatric: Calm, normal eye contact, alert, affect appropriate to situation, oriented, thought process normal.

## 2021-10-15 NOTE — PLAN OF CARE
"/72 (BP Location: Left arm)   Pulse 60   Temp (!) 96.5  F (35.8  C) (Oral)   Resp 18   Ht 1.626 m (5' 4\")   Wt 49.4 kg (109 lb)   SpO2 100%   Breastfeeding No   BMI 18.71 kg/m         Shift: 2835-3435     Neuro: A&Ox4. Denies any numbness/tingling. Able to make needs known.   Cardiac: WDL. Denies chest pain.   Resp: WDL. Denies SOB. On RA. Encouraged IS q2h while awake - completing independently   GI/: Voiding. No BM this shift, stool softeners given. Denies N/V.   Skin/Incisions: CTx2 to -20 suction. Minimal output- Dressings CDI.  R PIV SL   Pain: controlled with Toradol; refused Tylenol  Diet: Regular diet. Good appetite.      Activity: UAL; ambulating in [place] this shift      Changes/Plan: Patient asked for Toradol to be discontinued and PO ibuprofen to be added to MAR; Continue with POC.   "

## 2021-10-15 NOTE — PLAN OF CARE
"/68 (BP Location: Left arm)   Pulse 52   Temp (!) 96.2  F (35.7  C) (Oral)   Resp 20   Ht 1.626 m (5' 4\")   Wt 49.4 kg (109 lb)   SpO2 97%   Breastfeeding No   BMI 18.71 kg/m       Neuros:  A&Ox4. Able to make needs known.   Activity: up ad odalis   Cardiac:  WNL. Denies cardiac chest pain   Respiratory: Sating well on RA. R chest tube  And R pig tail CT- both to -20 suction. Most lateral CT with slight crepitus. Denies SOB  Diet: regular   GI/Gu:  Voiding without difficulty.No BM this shift. Denies N/V  Skin/Incisions: No new deficits noted   LDA: PIV SL  Pain: 3/10 pain at CT site   PRN: Ibuprofen   Changes:  No acute changes this shift   Plan: Continue POC     NICHO RUBIO, RN on 10/15/2021  "

## 2021-10-16 ENCOUNTER — APPOINTMENT (OUTPATIENT)
Dept: GENERAL RADIOLOGY | Facility: CLINIC | Age: 51
DRG: 200 | End: 2021-10-16
Attending: PHYSICIAN ASSISTANT
Payer: COMMERCIAL

## 2021-10-16 VITALS
RESPIRATION RATE: 18 BRPM | DIASTOLIC BLOOD PRESSURE: 68 MMHG | HEART RATE: 57 BPM | HEIGHT: 64 IN | WEIGHT: 109 LBS | TEMPERATURE: 96.6 F | BODY MASS INDEX: 18.61 KG/M2 | SYSTOLIC BLOOD PRESSURE: 109 MMHG | OXYGEN SATURATION: 100 %

## 2021-10-16 PROCEDURE — 71046 X-RAY EXAM CHEST 2 VIEWS: CPT

## 2021-10-16 PROCEDURE — 250N000013 HC RX MED GY IP 250 OP 250 PS 637: Performed by: NURSE PRACTITIONER

## 2021-10-16 PROCEDURE — 99239 HOSP IP/OBS DSCHRG MGMT >30: CPT | Performed by: PHYSICIAN ASSISTANT

## 2021-10-16 PROCEDURE — 250N000013 HC RX MED GY IP 250 OP 250 PS 637: Performed by: STUDENT IN AN ORGANIZED HEALTH CARE EDUCATION/TRAINING PROGRAM

## 2021-10-16 PROCEDURE — 71046 X-RAY EXAM CHEST 2 VIEWS: CPT | Mod: 26 | Performed by: RADIOLOGY

## 2021-10-16 PROCEDURE — 94150 VITAL CAPACITY TEST: CPT

## 2021-10-16 RX ORDER — ACETAMINOPHEN 325 MG/1
975 TABLET ORAL EVERY 6 HOURS PRN
Status: DISCONTINUED | OUTPATIENT
Start: 2021-10-16 | End: 2021-10-16 | Stop reason: HOSPADM

## 2021-10-16 RX ORDER — METHOCARBAMOL 750 MG/1
750 TABLET, FILM COATED ORAL 3 TIMES DAILY
Qty: 10 TABLET | Refills: 0 | Status: SHIPPED | OUTPATIENT
Start: 2021-10-16 | End: 2022-12-05

## 2021-10-16 RX ADMIN — POLYETHYLENE GLYCOL 3350 17 G: 17 POWDER, FOR SOLUTION ORAL at 07:51

## 2021-10-16 RX ADMIN — DOCUSATE SODIUM 50 MG AND SENNOSIDES 8.6 MG 1 TABLET: 8.6; 5 TABLET, FILM COATED ORAL at 07:52

## 2021-10-16 RX ADMIN — Medication 1 TABLET: at 07:51

## 2021-10-16 ASSESSMENT — ACTIVITIES OF DAILY LIVING (ADL)
ADLS_ACUITY_SCORE: 3

## 2021-10-16 NOTE — PLAN OF CARE
Patient verbalized understanding of After Visit Summary. Peripheral IV removed. Personal belongings packed and spouse picked up medication at discharge pharmacy.

## 2021-10-16 NOTE — PLAN OF CARE
"/55 (BP Location: Left arm)   Pulse 58   Temp 97.8  F (36.6  C) (Oral)   Resp 16   Ht 1.626 m (5' 4\")   Wt 49.4 kg (109 lb)   SpO2 96%   Breastfeeding No   BMI 18.71 kg/m       Activity: Up ad odalis.   Neuros: A&O x4.   Cardiac: WDL, denies chest pain.   Respiratory: Satting well on RA. Denies SOB. R chest tube to water seal, crepitus around insertion site.  GI/: Voiding w/o difficulty. No BM this shift; last BM 10/14.  Diet: Regular.   Skin: No new deficits noted.   Lines: R PIV - SL.   Labs: Reviewed.  Pain/nausea: Managed w/ Ibuprofen. Pt refuses Tylenol.    Plan: Continue POC. Possible discharge today.   "

## 2021-10-16 NOTE — PLAN OF CARE
Patient reports little discomfort,declined Robaxin.Chest tube removed per MD,denies SOB.Repeat CXR at 1500.Voiced wishes for discharge.Ad odalis in halls.Tolerated food/fluid intake.Dressing supplies given.
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
## 2021-10-16 NOTE — PLAN OF CARE
Activity: up independently, ambulating in hallway  Neuros:alert and oriented x 4   Cardiac:denies chest pain  Respiratory:chest tube to water seal, crepitus around insertion site  GI/:LBM 10/14, voiding  Diet:regular  Skin:chest tube insertion site  Lines/Drains:PIV saline locked, chest tube

## 2021-10-16 NOTE — DISCHARGE INSTRUCTIONS
Front dressing keep in place until 10/18 @ 0830.  Back Dressing keep in place until 10/19 @ 1045.        Rib Fracture    You broke one or more ribs. This is called a rib fracture. Rib fractures don't need a cast like other bones. They will heal by themselves in about 4 to 6 weeks. The first 3 to 4 weeks will be the most painful. During this time deep breathing, coughing, or changing position from sitting to lying down, may cause the broken ends to move slightly.   Home care    Rest. You should not be doing any heavy lifting or strenuous exertion until the pain goes away.    It hurts to breathe when you have a broken rib. This puts you at risk of getting pneumonia from poor airflow through your lungs. To prevent this:  ? Take several very deep breaths once an hour while you're awake. Breathe out through pursed lips as if you are blowing up a balloon. If possible, actually blow up a balloon or a rubber glove. This exercise builds up pressure inside the lung and prevents collapse of the small air sacs of the lung. This exercise may cause some pain at the site of injury. This is normal.  ? You may have gotten a breathing exercise device called an incentive spirometer. Use it at least 4 times a day, or as directed.    Apply an ice pack over the injured area for 15 to 20 minutes every 1 to 2 hours. You should do this for the first 24 to 48 hours. To make an ice pack, put ice cubes in a plastic bag that seals at the top. Wrap the bag in a clean, thin towel or cloth. Never put ice or an ice pack directly on the skin. Keep using ice packs as needed for the relief of pain and swelling.    You may use over-the-counter pain medicine to control pain, unless another pain medicine was prescribed. If you have chronic liver or kidney disease or ever had a stomach ulcer or GI (gastrointestinal) bleeding, talk with your healthcare provider before using these medicines.    If your pain is not controlled, contact your healthcare  provider. Sometimes a stronger pain medicine may be needed. A nerve block can be done in case of severe pain. It will numb the nerve between the ribs.    Follow-up care  Follow up with your healthcare provider, or as advised. In rare cases, a broken rib will cause complications in the first few days that may not be clearly seen during your initial exam. This can include collapsed lung, bleeding around the lung or into the belly (abdomen), or pneumonia. So watch for the signs below.   If X-rays were taken, you will be told of any new findings that may affect your care.  Call 911  Call 911 if you have:     Dizziness, weakness or fainting    Shortness of breath with or without chest discomfort    New or worsening abdominal pain    Discomfort in other areas of your upper body such as your shoulders, jaw, neck, or arms  When to seek medical advice  Call your healthcare provider right away if any of these occur:    Increasing chest pain with breathing    Fever of 100.4 F (38 C) or above, or as directed by your healthcare provider    Congested cough, nausea, or vomiting  Thelma last reviewed this educational content on 4/1/2018 2000-2021 [employer]. All rights reserved. This information is not intended as a substitute for professional medical care. Always follow your healthcare professional's instructions.

## 2021-10-18 ENCOUNTER — PATIENT OUTREACH (OUTPATIENT)
Dept: SURGERY | Facility: CLINIC | Age: 51
End: 2021-10-18

## 2021-10-18 DIAGNOSIS — S27.0XXD TRAUMATIC PNEUMOTHORAX, SUBSEQUENT ENCOUNTER: Primary | ICD-10-CM

## 2021-10-18 NOTE — TELEPHONE ENCOUNTER
REFERRAL INFORMATION:    Referring Provider:  N/A    Referring Clinic:  N/A    Reason for Visit/Diagnosis: Pneumothorax, Hospital follow up        FUTURE VISIT INFORMATION:    Appointment Date: 10/22/2021    Appointment Time: 12 PM      NOTES RECORD STATUS  DETAILS   OFFICE NOTE from Referring Provider N/A    OFFICE NOTE from Other Specialists Internal 10/11/2021 Office visit with Dr. Carmella Miles ( [facility])     HOSPITAL DISCHARGE SUMMARY/ ED VISITS  Internal 10/11/2021 ([facility])    OPERATIVE REPORT N/A    ENDOSCOPY (EGD)  N/A    PERTINENT LABS Internal    PATHOLOGY REPORTS (RELATED) N/A    IMAGING (CT, MRI, US, XR)  Internal XR Chest: 10/16/2021
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
## 2021-10-18 NOTE — PROGRESS NOTES
RN Post-Op/Post-Discharge Care Coordination Note    Spoke with Patient.    Support  Patient able to care for self independently     Health Status  Eating/drinking: Patient is able to eat and drink without any complaints.  Bowel habits: Patient reports having a normal bowel movement.  Drains (JOAN): N/A  Fevers/chills: Patient denies any fever or chills.  Pain: Improved.  She is not taking anything during the day. Patient has been medicating with Robaxin and Tylenol at .    New Medications:  Robaxin    Activity/Restrictions  No restrictions    Equipment  IS    Pathology reviewed with patient:  N/A    Forms/Letters  Yes- off of work 10/11 - 10/17.  RTW 4 hrs/day 10/18 - 10/22.  10/25 regular schedule.    All of her questions were answered.  She will call this office if she has any further questions and/or concerns.      Post op appointment with Dr. Campos 10/22 at 12 PM, CXR at 1120- [facility].    Whom and When to Call  Patient acknowledges understanding of how to manage any medication changes and   when to seek medical care.     Patient advised that if after hour medical concerns arise to please call [phone] and choose option 4 to speak to the physician on call.

## 2021-10-19 ENCOUNTER — PATIENT OUTREACH (OUTPATIENT)
Dept: SURGERY | Facility: CLINIC | Age: 51
End: 2021-10-19

## 2021-10-19 ASSESSMENT — ENCOUNTER SYMPTOMS
STIFFNESS: 0
COUGH DISTURBING SLEEP: 0
DIZZINESS: 0
DYSPNEA ON EXERTION: 1
SPEECH CHANGE: 0
ARTHRALGIAS: 0
POSTURAL DYSPNEA: 0
NUMBNESS: 0
WHEEZING: 0
MYALGIAS: 1
SPUTUM PRODUCTION: 0
HEMOPTYSIS: 0
MUSCLE WEAKNESS: 0
PARALYSIS: 0
NECK PAIN: 0
BACK PAIN: 1
HEADACHES: 1
SHORTNESS OF BREATH: 1
WEAKNESS: 0
TINGLING: 0
JOINT SWELLING: 0
LOSS OF CONSCIOUSNESS: 0
SNORES LOUDLY: 0
MUSCLE CRAMPS: 0
DISTURBANCES IN COORDINATION: 0
SEIZURES: 0
COUGH: 0
MEMORY LOSS: 0
TREMORS: 0

## 2021-10-21 ENCOUNTER — THERAPY VISIT (OUTPATIENT)
Dept: PHYSICAL THERAPY | Facility: CLINIC | Age: 51
End: 2021-10-21
Attending: PHYSICIAN ASSISTANT
Payer: COMMERCIAL

## 2021-10-21 ENCOUNTER — PATIENT OUTREACH (OUTPATIENT)
Dept: SURGERY | Facility: CLINIC | Age: 51
End: 2021-10-21

## 2021-10-21 DIAGNOSIS — M95.8 WINGED SCAPULA OF RIGHT SIDE: ICD-10-CM

## 2021-10-21 DIAGNOSIS — S22.41XA CLOSED FRACTURE OF MULTIPLE RIBS OF RIGHT SIDE, INITIAL ENCOUNTER: ICD-10-CM

## 2021-10-21 PROCEDURE — 97161 PT EVAL LOW COMPLEX 20 MIN: CPT | Mod: GP | Performed by: PHYSICAL THERAPIST

## 2021-10-21 PROCEDURE — 97110 THERAPEUTIC EXERCISES: CPT | Mod: GP | Performed by: PHYSICAL THERAPIST

## 2021-10-21 NOTE — PROGRESS NOTES
Patient Telephone Reminder Call    Date of call:  10/21/21  Phone numbers:  Home number on file [phone] (home)    Reached patient/confirmed appointment:  No - left message:   on voicemail  Appointment with:   Dr. Cj Campos  Reason for visit:  Hospital follow-up, reminded patient of CXR at 1120
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
## 2021-10-22 ENCOUNTER — ANCILLARY PROCEDURE (OUTPATIENT)
Dept: CT IMAGING | Facility: CLINIC | Age: 51
End: 2021-10-22
Attending: SURGERY
Payer: COMMERCIAL

## 2021-10-22 ENCOUNTER — OFFICE VISIT (OUTPATIENT)
Dept: SURGERY | Facility: CLINIC | Age: 51
End: 2021-10-22
Payer: COMMERCIAL

## 2021-10-22 ENCOUNTER — ANCILLARY PROCEDURE (OUTPATIENT)
Dept: GENERAL RADIOLOGY | Facility: CLINIC | Age: 51
End: 2021-10-22
Attending: SURGERY
Payer: COMMERCIAL

## 2021-10-22 ENCOUNTER — PRE VISIT (OUTPATIENT)
Dept: SURGERY | Facility: CLINIC | Age: 51
End: 2021-10-22

## 2021-10-22 VITALS
DIASTOLIC BLOOD PRESSURE: 54 MMHG | HEART RATE: 61 BPM | SYSTOLIC BLOOD PRESSURE: 91 MMHG | HEIGHT: 64 IN | OXYGEN SATURATION: 100 % | BODY MASS INDEX: 18.16 KG/M2 | WEIGHT: 106.4 LBS

## 2021-10-22 DIAGNOSIS — R91.8 OPACITY OF LUNG ON IMAGING STUDY: Primary | ICD-10-CM

## 2021-10-22 DIAGNOSIS — S27.0XXD TRAUMATIC PNEUMOTHORAX, SUBSEQUENT ENCOUNTER: ICD-10-CM

## 2021-10-22 DIAGNOSIS — R91.8 OPACITY OF LUNG ON IMAGING STUDY: ICD-10-CM

## 2021-10-22 LAB — RADIOLOGIST FLAGS: ABNORMAL

## 2021-10-22 PROCEDURE — 99203 OFFICE O/P NEW LOW 30 MIN: CPT | Performed by: SURGERY

## 2021-10-22 PROCEDURE — 71046 X-RAY EXAM CHEST 2 VIEWS: CPT | Performed by: RADIOLOGY

## 2021-10-22 PROCEDURE — 71260 CT THORAX DX C+: CPT | Performed by: RADIOLOGY

## 2021-10-22 RX ORDER — IOPAMIDOL 755 MG/ML
52 INJECTION, SOLUTION INTRAVASCULAR ONCE
Status: COMPLETED | OUTPATIENT
Start: 2021-10-22 | End: 2021-10-22

## 2021-10-22 RX ADMIN — IOPAMIDOL 52 ML: 755 INJECTION, SOLUTION INTRAVASCULAR at 13:23

## 2021-10-22 ASSESSMENT — MIFFLIN-ST. JEOR: SCORE: 1077.63

## 2021-10-22 ASSESSMENT — PAIN SCALES - GENERAL: PAINLEVEL: MILD PAIN (2)

## 2021-10-22 NOTE — PATIENT INSTRUCTIONS
You met with Dr. jC Campos.      Today's visit instructions:    Dr. Campos recommends that you have a CT of your chest. We have scheduled that for you. We will call you with those results and recommendations.        If you have questions please contact Jess RN or Seble RN during regular clinic hours, Monday through Friday 7:30 AM - 4:00 PM, or you can contact us via [employer] at anytime.       If you have urgent needs after-hours, weekends, or holidays please call the hospital at [phone] and ask to speak with our on-call General Surgery Team.    Appointment [phone], option #1   Nurse Advice (Jess or Seble): [phone]   Surgery Scheduler (Casimiro): [phone]  Fax: [phone]
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
## 2021-10-22 NOTE — NURSING NOTE
"Chief Complaint   Patient presents with     New Patient     Pneumothorax, hospital follow up       Vitals:    10/22/21 1219   BP: 91/54   BP Location: Left arm   Patient Position: Sitting   Cuff Size: Adult Regular   Pulse: 61   SpO2: 100%   Weight: 48.3 kg (106 lb 6.4 oz)   Height: 1.626 m (5' 4\")       Body mass index is 18.26 kg/m .                          Dhara Coles, EMT    "

## 2021-10-22 NOTE — NURSING NOTE
Dr. Harmon called patient and left message relaying results of CT scan. Gave patient the number to call to schedule an appointment with the Pulmonary Lung Nodule Clinic at the [facility].

## 2021-10-23 LAB — RADIOLOGIST FLAGS: ABNORMAL
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
## 2021-11-03 ENCOUNTER — OFFICE VISIT (OUTPATIENT)
Dept: PULMONOLOGY | Facility: CLINIC | Age: 51
End: 2021-11-03
Attending: SURGERY
Payer: COMMERCIAL

## 2021-11-03 VITALS
SYSTOLIC BLOOD PRESSURE: 122 MMHG | TEMPERATURE: 98.2 F | HEIGHT: 64 IN | DIASTOLIC BLOOD PRESSURE: 81 MMHG | RESPIRATION RATE: 16 BRPM | HEART RATE: 67 BPM | OXYGEN SATURATION: 98 % | WEIGHT: 109 LBS | BODY MASS INDEX: 18.61 KG/M2

## 2021-11-03 DIAGNOSIS — R91.8 OPACITY OF LUNG ON IMAGING STUDY: ICD-10-CM

## 2021-11-03 PROCEDURE — G0463 HOSPITAL OUTPT CLINIC VISIT: HCPCS

## 2021-11-03 PROCEDURE — 99203 OFFICE O/P NEW LOW 30 MIN: CPT | Performed by: INTERNAL MEDICINE

## 2021-11-03 ASSESSMENT — MIFFLIN-ST. JEOR: SCORE: 1085.93

## 2021-11-03 ASSESSMENT — PAIN SCALES - GENERAL: PAINLEVEL: MILD PAIN (2)
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
## 2021-11-03 NOTE — NURSING NOTE
"Oncology Rooming Note    November 3, 2021 9:58 AM   Kay Ceja is a 51 year old female who presents for:    Chief Complaint   Patient presents with     Oncology Clinic Visit     Patient with Opacity of lung here for provider visit     Initial Vitals: /81 (BP Location: Left arm, Patient Position: Sitting, Cuff Size: Adult Regular)   Pulse 67   Temp 98.2  F (36.8  C) (Tympanic)   Resp 16   Ht 1.62 m (5' 3.78\")   Wt 49.4 kg (109 lb)   SpO2 98%   BMI 18.84 kg/m   Estimated body mass index is 18.84 kg/m  as calculated from the following:    Height as of this encounter: 1.62 m (5' 3.78\").    Weight as of this encounter: 49.4 kg (109 lb). Body surface area is 1.49 meters squared.  Mild Pain (2) Comment: Data Unavailable   No LMP recorded. (Menstrual status: Amenorrhea).  Allergies reviewed: Yes  Medications reviewed: No    Medications: Medication refills not needed today.  Pharmacy name entered into [facility]:    [id number] [place], [place] - [address] [facility]  WRITTEN PRESCRIPTION REQUESTED  [id number] [place], [place] - [address]    Clinical concerns: no         Humera Salas CMA              "

## 2022-01-15 PROBLEM — S22.41XA CLOSED FRACTURE OF MULTIPLE RIBS OF RIGHT SIDE, INITIAL ENCOUNTER: Status: RESOLVED | Noted: 2021-10-21 | Resolved: 2022-01-15

## 2022-01-15 PROBLEM — M95.8 WINGED SCAPULA OF RIGHT SIDE: Status: RESOLVED | Noted: 2021-10-21 | Resolved: 2022-01-15

## 2022-01-15 NOTE — PROGRESS NOTES
Patient seen for one time evaluation and treatment.  Patient did not return for further treatment and current status is unknown.  Please see initial evaluation for further information.    Parrsi Cuevas, PT

## 2022-02-09 ENCOUNTER — ANCILLARY PROCEDURE (OUTPATIENT)
Dept: CT IMAGING | Facility: CLINIC | Age: 52
End: 2022-02-09
Attending: INTERNAL MEDICINE
Payer: COMMERCIAL

## 2022-02-09 ENCOUNTER — OFFICE VISIT (OUTPATIENT)
Dept: PULMONOLOGY | Facility: CLINIC | Age: 52
End: 2022-02-09
Attending: INTERNAL MEDICINE
Payer: COMMERCIAL

## 2022-02-09 VITALS
DIASTOLIC BLOOD PRESSURE: 68 MMHG | WEIGHT: 110.2 LBS | SYSTOLIC BLOOD PRESSURE: 106 MMHG | TEMPERATURE: 98.1 F | HEIGHT: 64 IN | HEART RATE: 61 BPM | OXYGEN SATURATION: 99 % | BODY MASS INDEX: 18.82 KG/M2

## 2022-02-09 DIAGNOSIS — R91.8 OPACITY OF LUNG ON IMAGING STUDY: ICD-10-CM

## 2022-02-09 DIAGNOSIS — S27.321D CONTUSION OF RIGHT LUNG, SUBSEQUENT ENCOUNTER: Primary | ICD-10-CM

## 2022-02-09 PROCEDURE — 71250 CT THORAX DX C-: CPT | Performed by: RADIOLOGY

## 2022-02-09 PROCEDURE — 99214 OFFICE O/P EST MOD 30 MIN: CPT | Performed by: INTERNAL MEDICINE

## 2022-02-09 PROCEDURE — G0463 HOSPITAL OUTPT CLINIC VISIT: HCPCS

## 2022-02-09 ASSESSMENT — MIFFLIN-ST. JEOR: SCORE: 1091.37

## 2022-02-09 ASSESSMENT — PAIN SCALES - GENERAL: PAINLEVEL: NO PAIN (1)
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
## 2022-02-09 NOTE — NURSING NOTE
"Oncology Rooming Note    February 9, 2022 9:38 AM   Kay Ceja is a 51 year old female who presents for:    No chief complaint on file.    Initial Vitals: There were no vitals taken for this visit. Estimated body mass index is 18.84 kg/m  as calculated from the following:    Height as of 11/3/21: 1.62 m (5' 3.78\").    Weight as of 11/3/21: 49.4 kg (109 lb). There is no height or weight on file to calculate BSA.  Data Unavailable Comment: Data Unavailable   No LMP recorded. (Menstrual status: Amenorrhea).  Allergies reviewed: Yes  Medications reviewed: Yes    Medications: Medication refills not needed today.  Pharmacy name entered into [employer]:    [id number] [place], [place] - [address] [facility]  WRITTEN PRESCRIPTION REQUESTED  [id number] [place], [place] - [address]     Clinical concerns: Pt would like to know her scan results.    She would also like to have the ribcage pain checked out.       Alvin Griffin              "
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
## 2022-03-01 ENCOUNTER — OFFICE VISIT (OUTPATIENT)
Dept: DERMATOLOGY | Facility: CLINIC | Age: 52
End: 2022-03-01
Payer: COMMERCIAL

## 2022-03-01 DIAGNOSIS — L82.1 SEBORRHEIC KERATOSIS: ICD-10-CM

## 2022-03-01 DIAGNOSIS — B07.9 VERRUCA VULGARIS: ICD-10-CM

## 2022-03-01 DIAGNOSIS — L73.8 SENILE SEBACEOUS GLAND HYPERPLASIA: ICD-10-CM

## 2022-03-01 DIAGNOSIS — L81.4 SOLAR LENTIGO: ICD-10-CM

## 2022-03-01 DIAGNOSIS — D18.01 CHERRY ANGIOMA: ICD-10-CM

## 2022-03-01 DIAGNOSIS — L57.0 ACTINIC KERATOSIS: ICD-10-CM

## 2022-03-01 DIAGNOSIS — L70.0 ACNE VULGARIS: Primary | ICD-10-CM

## 2022-03-01 DIAGNOSIS — D22.9 MULTIPLE BENIGN NEVI: ICD-10-CM

## 2022-03-01 DIAGNOSIS — L82.0 INFLAMED SEBORRHEIC KERATOSIS: ICD-10-CM

## 2022-03-01 PROCEDURE — 17110 DESTRUCTION B9 LES UP TO 14: CPT | Mod: GC | Performed by: DERMATOLOGY

## 2022-03-01 PROCEDURE — 17000 DESTRUCT PREMALG LESION: CPT | Mod: GC | Performed by: DERMATOLOGY

## 2022-03-01 PROCEDURE — 17003 DESTRUCT PREMALG LES 2-14: CPT | Mod: GC | Performed by: DERMATOLOGY

## 2022-03-01 PROCEDURE — 99214 OFFICE O/P EST MOD 30 MIN: CPT | Mod: 25 | Performed by: DERMATOLOGY

## 2022-03-01 RX ORDER — TRETINOIN 0.25 MG/G
CREAM TOPICAL
Qty: 45 G | Refills: 11 | Status: SHIPPED | OUTPATIENT
Start: 2022-03-01 | End: 2022-03-01

## 2022-03-01 RX ORDER — TRETINOIN 0.25 MG/G
CREAM TOPICAL
Qty: 45 G | Refills: 11 | Status: SHIPPED | OUTPATIENT
Start: 2022-03-01 | End: 2022-09-22

## 2022-03-01 ASSESSMENT — PAIN SCALES - GENERAL: PAINLEVEL: NO PAIN (0)

## 2022-03-01 NOTE — PROGRESS NOTES
[facility] Dermatology Note  Encounter Date: Mar 1, 2022  Office Visit     Dermatology Problem List:  1. History of NMSC:              - BCC, R superior forehead s/p MMS 10/2014              - BCC, R [place], s/p MMS in [place] in 2012  2. Multiple (>100) atypical nevi on trunk and extremities. Prior bxs with moderate dysplasia.              - will refer for Dr. Belle mole mapping  3. AKs: LN2, field therapy states no reaction (per patient), but also documented as improved after field therapy with 5-FU 1-2x/week  4. Flat warts, dorsal PIPs. Cryo 3/14/16, 3/1/22  5. Acne: tretinoin 0.025%  6. Sebaceous hyperplasia. tretinoin 0.025% cream.  7. 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 Resolved with TMC 0.1% cream  8. Seborrheic Keratosis  ____________________________________________    Assessment & Plan:    # Flat warts, dorsal PIPs  Counseled on the nature of this skin condition and treatment options.  - Patient opted for cryotherapy (see procedure note below)    # Seborrheic dermatitis  Etiology reviewed. Recommended trial of topical therapies as below. Verbal instructions provided. Patient expressed understanding and agreed to plan.   - Start over-the-counter zinc-based or tar-based shampoo in shower    # Actinic keratosis  Suspect AKs on R forehead, L medial brow, dorsal bridge of nose. Would recommend treating with cryotherapy. If not resolved, recommend follow up.    # Acne vulgaris. Chronic, flare.   - start tretinoin 0.025% cream at bedtime: apply pea-sized amount to the entire face then follow with moisturizer; start using a few nights per week then gradually increase frequently to goal of nightly use  - topical retinoid side effects including dryness/redness/irritation/possible increase in outbreaks in the beginning of therapy reviewed  - hand out provided     # Sebaceous hyperplasia  Reassured of benign nature of these skin findings    #Benign lesions:  Multiple benign nevi, solar lentigos, seborrheic keratoses, cherry angiomas. Explained to patient benign nature of lesion. No treatment is necessary at this time unless the lesion changes or becomes symptomatic.   - ABCDs of melanoma were discussed and self skin checks were advised.  - Sun precaution was advised including the use of sun screens of SPF 30 or higher, sun protective clothing, and avoidance of tanning beds.    Procedures Performed:   - Cryotherapy procedure note, location(s): face(AKs), fingers (warts) . After verbal consent and discussion of risks and benefits including, but not limited to, dyspigmentation/scar, blister, and pain, 5 lesion(s) was(were) treated with 1-2 mm freeze border for 1-2 cycles with liquid nitrogen. Post cryotherapy instructions were provided.    Follow-up: 3 month(s) in-person, or earlier for new or changing lesions    Staff/Resident: Leeann/Mian Pappas MD MPH  PGY3 Medicine/Dermatology Resident    Staff Physician Comments:   I saw and evaluated the patient with the resident and I agree with the assessment and plan.  I was present for the entire minor procedure and examination.    Joel Bradshaw MD  Pronouns: he/him/his    Department of Dermatology  [facility]: Phone: [phone], Fax:[phone]  [facility]: Phone: [phone] Fax: [phone]  ____________________________________________    CC: Wart (pt states she is here for spots on her face, also warts on both hands. )    HPI:  Ms. Kay Ceja is a(n) 51 year old female who presents today as a return patient for warts and spot check on face. Warts have been frozen in the past with good response. Spots on face have been irritated and one has been bleeding. They have been there for a few weeks but not growing or changing since they appeared. Have never been frozen or treated. Also notes  acne lesions on the cheeks.    Patient is otherwise feeling well, without additional skin concerns.    Labs Reviewed:  N/A    Physical Exam:  Vitals: There were no vitals taken for this visit.  SKIN: Focused examination of face and hands was performed.  - Flat topped papules on dorsal PIPs  - There are superifical acneiform papules with intermixed open and closed comedones on the cheeks.  - There are erythematous macules with overyling adherent scale on the face in above locations  - There are fine lines and dyspigmentation on sun exposed areas of the face and chest.  - There is macular erythema of the eyebrows and scalp with moderate flaky white scale.  - There are yellow oily papules with central umbilication located on the forehead.  - No other lesions of concern on areas examined.     Medications:  Current Outpatient Medications   Medication     Calcium Carbonate-Vit D-Min (CALCIUM 1200 PO)     ibuprofen (ADVIL/MOTRIN) 200 MG tablet     Multiple Vitamins-Minerals (MULTIVITAMIN & MINERAL PO)     SUMAtriptan (IMITREX) 50 MG tablet     tretinoin (RETIN-A) 0.025 % external cream     methocarbamol (ROBAXIN) 750 MG tablet     No current facility-administered medications for this visit.      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

## 2022-03-01 NOTE — LETTER
3/1/2022       RE: Kay Ceja  [address]  [facility] [address]     Dear Colleague,    Thank you for referring your patient, Kya Ceja, to the [facility]ERMAT[facility] [place] at [facility]. Please see a copy of my visit note below.    [facility] Dermatology Note  Encounter Date: Mar 1, 2022  Office Visit     Dermatology Problem List:  1. History of NMSC:              - BCC, R superior forehead s/p MMS 10/2014              - BCC, R [place], s/p MMS in [place] in 2012  2. Multiple (>100) atypical nevi on trunk and extremities. Prior bxs with moderate dysplasia.              - will refer for Dr. Belle mole mapping  3. AKs: LN2, field therapy states no reaction (per patient), but also documented as improved after field therapy with 5-FU 1-2x/week  4. Flat warts, dorsal PIPs. Cryo 3/14/16, 3/1/22  5. Acne: tretinoin 0.025%  6. Sebaceous hyperplasia. tretinoin 0.025% cream.  7. 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 Resolved with TMC 0.1% cream  8. Seborrheic Keratosis  ____________________________________________    Assessment & Plan:    # Flat warts, dorsal PIPs  Counseled on the nature of this skin condition and treatment options.  - Patient opted for cryotherapy (see procedure note below)    # Seborrheic dermatitis  Etiology reviewed. Recommended trial of topical therapies as below. Verbal instructions provided. Patient expressed understanding and agreed to plan.   - Start over-the-counter zinc-based or tar-based shampoo in shower    # Actinic keratosis  Suspect AKs on R forehead, L medial brow, dorsal bridge of nose. Would recommend treating with cryotherapy. If not resolved, recommend follow up.    # Acne vulgaris. Chronic, flare.   - start tretinoin 0.025% cream at bedtime: apply pea-sized amount to the entire face then follow with moisturizer; start using a few nights per week then  gradually increase frequently to goal of nightly use  - topical retinoid side effects including dryness/redness/irritation/possible increase in outbreaks in the beginning of therapy reviewed  - hand out provided     # Sebaceous hyperplasia  Reassured of benign nature of these skin findings    #Benign lesions: Multiple benign nevi, solar lentigos, seborrheic keratoses, cherry angiomas. Explained to patient benign nature of lesion. No treatment is necessary at this time unless the lesion changes or becomes symptomatic.   - ABCDs of melanoma were discussed and self skin checks were advised.  - Sun precaution was advised including the use of sun screens of SPF 30 or higher, sun protective clothing, and avoidance of tanning beds.    Procedures Performed:   - Cryotherapy procedure note, location(s): face(AKs), fingers (warts) . After verbal consent and discussion of risks and benefits including, but not limited to, dyspigmentation/scar, blister, and pain, 5 lesion(s) was(were) treated with 1-2 mm freeze border for 1-2 cycles with liquid nitrogen. Post cryotherapy instructions were provided.    Follow-up: 3 month(s) in-person, or earlier for new or changing lesions    Staff/Resident: Leeann/Mian Pappas MD MPH  PGY3 Medicine/Dermatology Resident    Staff Physician Comments:   I saw and evaluated the patient with the resident and I agree with the assessment and plan.  I was present for the entire minor procedure and examination.    Joel Bradshaw MD  Pronouns: he/him/his    Department of Dermatology  [facility]: Phone: [phone], Fax:[phone]  [facility]: Phone: [phone] Fax: [phone]  ____________________________________________    CC: Wart (pt states she is here for spots on her face, also warts on both hands. )    HPI:  Ms. Kay Ceja is a(n) 51 year old female who  presents today as a return patient for warts and spot check on face. Warts have been frozen in the past with good response. Spots on face have been irritated and one has been bleeding. They have been there for a few weeks but not growing or changing since they appeared. Have never been frozen or treated. Also notes acne lesions on the cheeks.    Patient is otherwise feeling well, without additional skin concerns.    Labs Reviewed:  N/A    Physical Exam:  Vitals: There were no vitals taken for this visit.  SKIN: Focused examination of face and hands was performed.  - Flat topped papules on dorsal PIPs  - There are superifical acneiform papules with intermixed open and closed comedones on the cheeks.  - There are erythematous macules with overyling adherent scale on the face in above locations  - There are fine lines and dyspigmentation on sun exposed areas of the face and chest.  - There is macular erythema of the eyebrows and scalp with moderate flaky white scale.  - There are yellow oily papules with central umbilication located on the forehead.  - No other lesions of concern on areas examined.     Medications:  Current Outpatient Medications   Medication     Calcium Carbonate-Vit D-Min (CALCIUM 1200 PO)     ibuprofen (ADVIL/MOTRIN) 200 MG tablet     Multiple Vitamins-Minerals (MULTIVITAMIN & MINERAL PO)     SUMAtriptan (IMITREX) 50 MG tablet     tretinoin (RETIN-A) 0.025 % external cream     methocarbamol (ROBAXIN) 750 MG tablet     No current facility-administered medications for this visit.      Past Medical History:   Patient Active Problem List   Diagnosis     Anxiety     Amenorrhea     Osteopenia     Senile sebaceous gland hyperplasia     Atypical nevi     Basal cell carcinoma of right forehead s/p mms 10-13-14     Pain in joint, pelvic region and thigh     Pain in right ankle     Peroneal tendinitis     Traumatic pneumothorax, initial encounter     Past Medical History:   Diagnosis Date     Amenorrhea      hypothalamic     Anxiety      Back pain     normal MRI 9/4/09     Basal cell carcinoma      Chondromalacia of right patella 2/12/08    MRI otherwise normal     Insomnia      Migraines      Osteopenia     T score -1.9 hip and back 1/19/11     Vasomotor rhinitis     uses nasalcort

## 2022-03-03 ENCOUNTER — TELEPHONE (OUTPATIENT)
Dept: DERMATOLOGY | Facility: CLINIC | Age: 52
End: 2022-03-03
Payer: COMMERCIAL

## 2022-03-03 NOTE — TELEPHONE ENCOUNTER
PA Initiation    Medication: tretinoin (RETIN-A) 0.025 % external cream   Insurance Company: [employer] - Phone [phone] Fax [phone]  Pharmacy Filling the Rx: [id number] - [place], [place] - [address]  Filling Pharmacy Phone: [phone]  Filling Pharmacy Fax: [phone]  Start Date: 3/3/2022

## 2022-03-03 NOTE — TELEPHONE ENCOUNTER
Prior Authorization Retail Medication Request    Medication/Dose: tretinoin (RETIN-A) 0.025 % external cream  ICD code (if different than what is on RX):  Acne vulgaris [L70.0]  Previously Tried and Failed:    Rationale:      Insurance Name:  [employer]  Insurance ID:  [id number]

## 2022-03-04 NOTE — TELEPHONE ENCOUNTER
Prior Authorization Approval    Authorization Effective Date: 3/1/2022  Authorization Expiration Date: 3/1/2023  Medication: tretinoin (RETIN-A) 0.025 % external cream--APPROVED  Approved Dose/Quantity:   Reference #:     Insurance Company: [employer] - Phone [phone] Fax [phone]  Expected CoPay:       CoPay Card Available:      Foundation Assistance Needed:    Which Pharmacy is filling the prescription (Not needed for infusion/clinic administered): [facility] [id number] [place], [place] - [address] [place] [address]  Pharmacy Notified: Yes  Patient Notified: Yes **Instructed pharmacy to notify patient when script is ready to /ship.**

## 2022-04-28 DIAGNOSIS — G43.009 MIGRAINE WITHOUT AURA AND WITHOUT STATUS MIGRAINOSUS, NOT INTRACTABLE: ICD-10-CM

## 2022-05-02 RX ORDER — SUMATRIPTAN 50 MG/1
50 TABLET, FILM COATED ORAL
Qty: 18 TABLET | Refills: 0 | Status: SHIPPED | OUTPATIENT
Start: 2022-05-02 | End: 2022-06-02

## 2022-05-02 NOTE — TELEPHONE ENCOUNTER
Last Clinic Visit: 10/11/2021  [facility]  Recommended 3 month follow up, no upcoming appointments scheduled

## 2022-05-14 ENCOUNTER — HEALTH MAINTENANCE LETTER (OUTPATIENT)
Age: 52
End: 2022-05-14

## 2022-05-29 DIAGNOSIS — G43.009 MIGRAINE WITHOUT AURA AND WITHOUT STATUS MIGRAINOSUS, NOT INTRACTABLE: ICD-10-CM

## 2022-06-02 RX ORDER — SUMATRIPTAN 50 MG/1
50 TABLET, FILM COATED ORAL
Qty: 18 TABLET | Refills: 1 | Status: SHIPPED | OUTPATIENT
Start: 2022-06-02 | End: 2022-08-25

## 2022-06-08 NOTE — TELEPHONE ENCOUNTER
Received a refill request for Tretinoin, after chart review, it appears this medication was discontinued on 3/14/17.  Refilled was denied and pharmacy was informed that pt needed a follow up appt.    Eucrisa Counseling: Patient may experience a mild burning sensation during topical application. Eucrisa is not approved in children less than 2 years of age.

## 2022-07-23 NOTE — PROGRESS NOTES
SUBJECTIVE:   CC: Kay Ceja is an 51 year old woman who presents for preventive health visit.       Patient has been advised of split billing requirements and indicates understanding: Yes  Healthy Habits:    Do you get at least three servings of calcium containing foods daily (dairy, green leafy vegetables, etc.)? yes    Amount of exercise or daily activities, outside of work: 7 day(s) per week walking 40 min gardening, weight training    Problems taking medications regularly No    Medication side effects: No    Have you had an eye exam in the past two years? Yes 6/2022    Do you see a dentist twice per year? yes    Do you have sleep apnea, excessive snoring or daytime drowsiness?no      PROBLEMS TO ADD ON...  1. PM  Gr0  - LMP 8/2021 - no vaginal bleeding, no discharge  Or itching or burning  - last pap was 1/2018 normal - no sexual problems - no hot flashes - no HT    Mammogram 7/2021   +FHx breast cancer     2. Migraines  -using imitrex -  Comes in clusters     3.osteopenia last DXA was 7/2020  And lowest T was -2.3 at L fem neck  - no bone medication     4.  Anxiety - SANTA this visit =2  - no meds - gets counseling 1x/month - no panic attacks     5. Hx basal cell - gets skin check yearly - due     6. Hx of pneumothorax -  10/2021 still some tenderness anterior rib area -when runs feels has less strength - did see pulmonology in 2/2022  Post pneumothorax and was told no residual   No wheezing    7. Hx of mountain sickness - going back packing - at 10,000 feet - wants prophylaxis      Health maintenance  Tetanus: ? 2018  Mammogram  2017  Pap - last 1/2018  Colonoscopy - FIT done in past   +FHx MGF  Shingrex  Due      Today's PHQ-2 Score:   PHQ-2 ( 1999 Pfizer) 3/1/2022 10/11/2021   Q1: Little interest or pleasure in doing things 0 0   Q2: Feeling down, depressed or hopeless 0 0   PHQ-2 Score 0 0   PHQ-2 Total Score (12-17 Years)- Positive if 3 or more points; Administer PHQ-A if positive - 0   Q1: Little  interest or pleasure in doing things - -   Q2: Feeling down, depressed or hopeless - -   PHQ-2 Score - -     SANTA Answers for HPI/ROS submitted by the patient on 2022  SANTA 7 TOTAL SCORE: 2      Abuse: Current or Past(Physical, Sexual or Emotional)- No  Do you feel safe in your environment? Yes    Have you ever done Advance Care Planning? (For example, a Health Directive, POLST, or a discussion with a medical provider or your loved ones about your wishes): No, advance care planning information given to patient to review.  Patient plans to discuss their wishes with loved ones or provider.      Social History     Tobacco Use     Smoking status: Former Smoker     Packs/day: 0.20     Years: 5.00     Pack years: 1.00     Types: Cigarettes     Quit date: 1999     Years since quittin.8     Smokeless tobacco: Never Used   Substance Use Topics     Alcohol use: No     If you drink alcohol do you typically have >3 drinks per day or >7 drinks per week? No                     Reviewed orders with patient.  Reviewed health maintenance and updated orders accordingly - Yes  Lab work is in process    FHS-7: No flowsheet data found.    Mammogram Screening: Recommended annual mammography  Pertinent mammograms are reviewed under the imaging tab.    Pertinent mammograms are reviewed under the imaging tab.  History of abnormal Pap smear: NO - age 30-65 PAP every 5 years with negative HPV co-testing recommended  PAP / HPV Latest Ref Rng & Units 2018 3/7/2013   PAP (Historical) - NIL NIL   HPV16 NEG:Negative Negative -   HPV18 NEG:Negative Negative -   HRHPV NEG:Negative Negative -     Reviewed and updated as needed this visit by clinical staff                    Reviewed and updated as needed this visit by Provider                       ROS:  CONSTITUTIONAL: NEGATIVE for fever, chills, change in weight  INTEGUMENTARY/SKIN: NEGATIVE for worrisome rashes, moles or lesions  EYES: NEGATIVE for vision changes or  irritation  ENT: NEGATIVE for ear, mouth and throat problems  RESP: NEGATIVE for significant cough or SOB  BREAST: NEGATIVE for masses, tenderness or discharge  CV: NEGATIVE for chest pain, palpitations or peripheral edema  GI: NEGATIVE for nausea, abdominal pain, heartburn, or change in bowel habits  : NEGATIVE for unusual urinary or vaginal symptoms. No vaginal bleeding.  MUSCULOSKELETAL: NEGATIVE for significant arthralgias or myalgia  NEURO: NEGATIVE for weakness, dizziness or paresthesias  ENDOCRINE: NEGATIVE for temperature intolerance, skin/hair changes  HEME/ALLERGY/IMMUNE: NEGATIVE for bleeding problems  PSYCHIATRIC: NEGATIVE for changes in mood or affect     OBJECTIVE:   /69   Pulse 62   EXAM:  GENERAL: healthy, alert and no distress  EYES: Eyes grossly normal to inspection, PERRL and conjunctivae and sclerae normal  HENT: ear canals and TM's normal, nose and mouth mask  NECK: no adenopathy, no asymmetry, masses, or scars and thyroid normal to palpation  RESP: lungs clear to auscultation - no rales, rhonchi or wheezes  BREAST: normal without masses, tenderness or nipple discharge and no palpable axillary masses or adenopathy  CV: regular rate and rhythm, normal S1 S2, no S3 or S4, no murmur, click or rub, no peripheral edema and peripheral pulses strong  ABDOMEN: soft, nontender, no hepatosplenomegaly, no masses and bowel sounds normal   (female): normal female external genitalia, normal urethral meatus, vaginal mucosa pink, moist, thinned, and normal cervix/adnexa/uterus without masses or discharge or tenderness - pap done   RECTAL: normal sphincter tone, no rectal masses  MS: no gross musculoskeletal defects noted, no edema  SKIN: no suspicious lesions or rashes  NEURO: Normal strength and tone, mentation intact and speech normal  PSYCH: mentation appears normal, affect normal/bright  LYMPH: no cervical, supraclavicular, axillary, or inguinal adenopathy    Diagnostic Test Results:  Labs  reviewed in Epic    ASSESSMENT/PLAN:   (G43.009) Migraine without aura and without status migrainosus, not intractable  (primary encounter diagnosis)  Comment: controlled  Plan: imitrex prn    (Z00.00) Annual physical exam  Comment: 50 yo healthy PM female comes in for annual exam - needs shingrix, will check on tetanus,  Pap done, mammogram recommended, blood work ordered.   Plan: Adult GI  Referral - Procedure Only,         Lipid Panel, Basic Metabolic Panel, Obtaining,         preparing and conveyance of cervical or vaginal        smear to laboratory., Pap thin layer screen         with HPV - recommended age 30 - 65 years            (M85.89) Osteopenia of multiple sites  Comment: due for f/u DXA - discussed calcium and vit D - no bone meds  Plan: Dexa Hip, Pelvis, Spine            (T70.20XA) Effects of high altitude, initial encounter  Comment: going back packing - will be at altitude - diamox script given  Plan: acetaZOLAMIDE (DIAMOX) 125 MG tablet        Take bid day before ascent and then for 2-4 days at altitude     Hx of pneumothorax:  Comment: last fall - unclear etiology - stable - still has some residual rib pain and feels tolerance is less  Plan: offered PFT's - she did have f/u with pulmonology in 2/2022     PLAN  Schedule a DXA  - same place as previous one  Pap today  Check on tetanus status  Get shingrix  Blood work when fasting  Diamox sent  Colonoscopy to schedule  Mammogram to schedule - get tomograms     Patient has been advised of split billing requirements and indicates understanding: Yes  COUNSELING:   Reviewed preventive health counseling, as reflected in patient instructions       Regular exercise       Healthy diet/nutrition       Vision screening       Hearing screening       Osteoporosis prevention/bone health       Colorectal Cancer Screening       Advance Care Planning    Estimated body mass index is 19.05 kg/m  as calculated from the following:    Height as of 2/9/22: 1.62 m  "(5' 3.78\").    Weight as of 2/9/22: 50 kg (110 lb 3.2 oz).    Weight management plan noted, stable and monitoring    She reports that she quit smoking about 22 years ago. Her smoking use included cigarettes. She has a 1.00 pack-year smoking history. She has never used smokeless tobacco.      Counseling Resources:  ATP IV Guidelines  Pooled Cohorts Equation Calculator  Breast Cancer Risk Calculator  BRCA-Related Cancer Risk Assessment: FHS-7 Tool  FRAX Risk Assessment  ICSI Preventive Guidelines  Dietary Guidelines for Americans, 2010  USDA's MyPlate  ASA Prophylaxis  Lung CA Screening    Carmella Miles MD PhD  [facility]    "

## 2022-07-24 ASSESSMENT — ANXIETY QUESTIONNAIRES
5. BEING SO RESTLESS THAT IT IS HARD TO SIT STILL: SEVERAL DAYS
4. TROUBLE RELAXING: SEVERAL DAYS
7. FEELING AFRAID AS IF SOMETHING AWFUL MIGHT HAPPEN: NOT AT ALL
7. FEELING AFRAID AS IF SOMETHING AWFUL MIGHT HAPPEN: NOT AT ALL
2. NOT BEING ABLE TO STOP OR CONTROL WORRYING: NOT AT ALL
1. FEELING NERVOUS, ANXIOUS, OR ON EDGE: NOT AT ALL
GAD7 TOTAL SCORE: 2
6. BECOMING EASILY ANNOYED OR IRRITABLE: NOT AT ALL
3. WORRYING TOO MUCH ABOUT DIFFERENT THINGS: NOT AT ALL

## 2022-07-25 ENCOUNTER — OFFICE VISIT (OUTPATIENT)
Dept: FAMILY MEDICINE | Facility: CLINIC | Age: 52
End: 2022-07-25
Attending: FAMILY MEDICINE
Payer: COMMERCIAL

## 2022-07-25 VITALS — SYSTOLIC BLOOD PRESSURE: 101 MMHG | DIASTOLIC BLOOD PRESSURE: 69 MMHG | HEART RATE: 62 BPM

## 2022-07-25 DIAGNOSIS — Z00.00 ANNUAL PHYSICAL EXAM: ICD-10-CM

## 2022-07-25 DIAGNOSIS — G43.009 MIGRAINE WITHOUT AURA AND WITHOUT STATUS MIGRAINOSUS, NOT INTRACTABLE: Primary | ICD-10-CM

## 2022-07-25 DIAGNOSIS — T70.20XA EFFECTS OF HIGH ALTITUDE, INITIAL ENCOUNTER: ICD-10-CM

## 2022-07-25 DIAGNOSIS — M85.89 OSTEOPENIA OF MULTIPLE SITES: ICD-10-CM

## 2022-07-25 PROCEDURE — G0145 SCR C/V CYTO,THINLAYER,RESCR: HCPCS | Performed by: FAMILY MEDICINE

## 2022-07-25 PROCEDURE — 99396 PREV VISIT EST AGE 40-64: CPT | Performed by: FAMILY MEDICINE

## 2022-07-25 PROCEDURE — G0463 HOSPITAL OUTPT CLINIC VISIT: HCPCS | Mod: 25

## 2022-07-25 PROCEDURE — 87624 HPV HI-RISK TYP POOLED RSLT: CPT | Performed by: FAMILY MEDICINE

## 2022-07-25 RX ORDER — ACETAZOLAMIDE 125 MG/1
TABLET ORAL
Qty: 20 TABLET | Refills: 1 | Status: SHIPPED | OUTPATIENT
Start: 2022-07-25 | End: 2022-08-16

## 2022-07-25 NOTE — PATIENT INSTRUCTIONS
Schedule a DXA  - same place as previous one  Pap today  Check on tetanus statuss  Get shingrix  Blood work when fasting  Diamox sent  Colonoscopy to schedule  Mammogram to schedule - get tomograms       Nutritious diet  Eat 1200 mg calcium total every day.  Many people achieve this by a diet containing calcium (milk, yogurt, cheese, and other dairy products, supplemented food) and 1 calcium pill. If you don't eat dairy, you should take a calcium supplement 2 times a day.  1000 IU of vitamin D on daily basis.    Eat a variety of fruits and vegetables and eat whole grains.  Try to choose foods with a high NuVal score, if your grocery store shows this number. High numbers, like 80 or 90, are nutritious foods, and low scores, like 10 are less nutritious foods.          Men should drink no more than 2 alcoholic beverages daily on average; women should drink no more than 1 alcoholic beverage daily on average.    Everyone should avoid all tobacco and nicotine-containing products.    Exercise: Aim for:  Moderate activity (where you can still talk while doing it, such as walking about 100 steps per minute, or 3,000 steps in 30 minutes) for 30 minutes at least 5 days a week  Or  Vigorous exercise (you are working so hard you are breathing hard and fast and your heart rate has gone up, such as playing basketball or soccer) at least 75 minutes weekly    If you have trouble doing 30 minutes of activity, all at once, try small bursts of activity. Go to www.myhub.Inter[employer] for suggestions on how you can do this at home or work.     All adults should try to do muscle strengthening exercise at least 2 days a week    Safety  Always wear bike/motorcycle helmet and seatbelt in a vehicle  Make sure your home has smoke and carbon monoxide detectors.  Wear broad spectrum sunscreen of at least SPF 15 on sun-exposed skin.    Preventing disease  See your dentist at least once a year  Have your eyes checked every 1-2 years.  Get a flu shot  each year.

## 2022-07-26 ENCOUNTER — TELEPHONE (OUTPATIENT)
Dept: GASTROENTEROLOGY | Facility: CLINIC | Age: 52
End: 2022-07-26

## 2022-07-26 NOTE — TELEPHONE ENCOUNTER
Screening Questions    BlueKIND OF PREP RedLOCATION [review exclusion criteria] GreenSEDATION TYPE      1. Are you active on mychart? y    2. What insurance is in the chart? [employer]     3.   Ordering/Referring Provider: Carmella Miles MD       4. BMI   (If greater than 40 review exclusion criteria [PAC APPT IF [MAC] @ [facility])  18.4  [If yes, BMI OVER 40-EXTENDED PREP]      **(Sedation review/consideration needed)**  Do you have a legal guardian or Medical Power of    and/or are you able to give consent for your medical care?     Can give consent    5. Have you had a positive covid test in the last 90 days?   n -     6.  Are you currently on dialysis?   n [ If yes, G-PREP & HOSPITAL setting ONLY]     7.  Do you have chronic kidney disease?  n [ If yes, G-PREP ]    8.   Do you have a diagnosis of diabetes?   n   [ If yes, G-PREP ]    9.  On a regular basis do you go 3-5 days between bowel movements?   n   [ If yes, EXTENDED PREP]    10.  Are you taking any prescription pain medications on a routine schedule?    n -  [ If yes, EXTENDED PREP] [If yes, MAC]      11.   Do you have any chemical dependencies such as alcohol, street drugs, or methadone?    n [If yes, MAC]    12.   Do you have any history of post-traumatic stress syndrome, severe anxiety or history of psychosis?    n  [If yes, MAC]    13.  [FEMALES] Are you currently pregnant? n    If yes, how many weeks?       Respiratory/Heart Screening:  [If yes to any of the following HOSPITAL setting only]     14. Do you have Pulmonary Hypertension [Lungs]?   n       15. Do you have UNCONTROLLED asthma?   n     16.  Do you use daily home oxygen?  n      17. Do you have mod to severe Obstructive Sleep Apnea?         (OKAY @ [facility]  [facility]  SH  PH  RI  MG - if pt is not on OXYGEN)  n      18.   Have you had a heart or lung transplant?   n      19.   Have you had a stroke or Transient ischemic attack (TIA - aka  mini stroke ) within 6 months?  (If yes, please  review exclusion criteria)  n     20.   In the past 6 months, have you had any heart related issues including cardiomyopathy or heart attack?   n           If yes, did it require cardiac stenting or other implantable device?         21.   Do you have any implantable devices in your body (pacemaker, defib, LVAD)? (If yes, please review exclusion criteria)  n     22. Do you take nitroglycerin?   n           If yes, how often?   (if yes, HOSPITAL setting ONLY)    23.  Are you currently taking any blood thinners?    [If yes, INFORM patient to follw up w/ ORDERING PROVIDER FOR BRIDGING INSTRUCTIONS]     n    24.   Do you transfer independently?                (If NO, please HOSPITAL setting ONLY)  y    25.   Preferred LOCAL Pharmacy for Pre Prescription:         [id number] [place], [place] - Formerly named [facility]  WRITTEN PRESCRIPTION REQUESTED      Scheduling Details      Caller :  Kay   (Please ask for phone number if not scheduled by patient)    Type of Procedure Scheduled: Lower Endoscopy [Colonoscopy]  Which Colonoscopy Prep was Sent?: larry    Surgeon: Danis  Date of Procedure: 9/22  [facility]    Sedation Type: MODERATE    Conscious Sedation- Needs  for 6 hours after the procedure  MAC/General-Needs  for 24 hours after procedure    Pre-op Required at [facility] [place], [place] and OR for MAC sedation: n  (advise patient they will need a pre-op prior to procedure -)      Informed patient they will need an adult  y  Cannot take any type of public or medical transportation alone    Pre-Procedure Covid test to be completed at [facility] or Externally: home test    Confirmed Nurse will call to complete assessment y    Additional comments:

## 2022-07-27 LAB
BKR LAB AP GYN ADEQUACY: NORMAL
BKR LAB AP GYN INTERPRETATION: NORMAL
BKR LAB AP HPV REFLEX: NORMAL
BKR LAB AP PREVIOUS ABNORMAL: NORMAL
PATH REPORT.COMMENTS IMP SPEC: NORMAL
PATH REPORT.COMMENTS IMP SPEC: NORMAL
PATH REPORT.RELEVANT HX SPEC: NORMAL

## 2022-07-29 LAB
HUMAN PAPILLOMA VIRUS 16 DNA: NEGATIVE
HUMAN PAPILLOMA VIRUS 18 DNA: NEGATIVE
HUMAN PAPILLOMA VIRUS FINAL DIAGNOSIS: NORMAL
HUMAN PAPILLOMA VIRUS OTHER HR: NEGATIVE

## 2022-08-16 DIAGNOSIS — T70.20XA EFFECTS OF HIGH ALTITUDE, INITIAL ENCOUNTER: ICD-10-CM

## 2022-08-16 RX ORDER — ACETAZOLAMIDE 125 MG/1
TABLET ORAL
Qty: 30 TABLET | Refills: 1 | Status: SHIPPED | OUTPATIENT
Start: 2022-08-16 | End: 2023-07-14

## 2022-08-22 DIAGNOSIS — G43.009 MIGRAINE WITHOUT AURA AND WITHOUT STATUS MIGRAINOSUS, NOT INTRACTABLE: ICD-10-CM

## 2022-08-25 NOTE — TELEPHONE ENCOUNTER
SUMATRIPTAN SUCC 50 MG TABLET     Last Written Prescription Date:  6/2/22  Last Fill Quantity: 18,   # refills: 1  Last Office Visit : 7/25/22  Future Office visit:  none    Routing refill request to provider for review/approval because:  Per protocol: If patient needs >8 treatments per month refer to provider  Thank-you, Kay NGO RN Medication Refill Team

## 2022-08-29 RX ORDER — SUMATRIPTAN 50 MG/1
50 TABLET, FILM COATED ORAL
Qty: 18 TABLET | Refills: 1 | Status: SHIPPED | OUTPATIENT
Start: 2022-08-29 | End: 2022-11-11

## 2022-09-03 ENCOUNTER — HEALTH MAINTENANCE LETTER (OUTPATIENT)
Age: 52
End: 2022-09-03

## 2022-09-15 ENCOUNTER — TELEPHONE (OUTPATIENT)
Dept: GASTROENTEROLOGY | Facility: CLINIC | Age: 52
End: 2022-09-15

## 2022-09-15 DIAGNOSIS — Z12.11 SPECIAL SCREENING FOR MALIGNANT NEOPLASMS, COLON: Primary | ICD-10-CM

## 2022-09-15 RX ORDER — BISACODYL 5 MG/1
TABLET, DELAYED RELEASE ORAL
Qty: 4 TABLET | Refills: 0 | Status: SHIPPED | OUTPATIENT
Start: 2022-09-15 | End: 2022-09-22

## 2022-09-15 NOTE — TELEPHONE ENCOUNTER
Pre assessment questions completed for upcoming colonoscopy procedure scheduled on 9.22.2022    Discussed at home rapid antigen COVID test 1-2 days prior to procedure.    Reviewed procedural arrival time 0855 and facility location [facility].    Designated  policy reviewed. Instructed to have someone stay 6 hours post procedure.     Anticoagulation/blood thinners? no    Electronic implanted devices? no    Reviewed Golytely prep instructions with patient. No fiber/iron supplements or foods that contain nuts/seeds prior to procedure.     Patient verbalized understanding and had no questions or concerns at this time.    Samra Maloney RN

## 2022-09-15 NOTE — TELEPHONE ENCOUNTER
Attempted to contact patient regarding upcoming colonoscopy procedure on 9.22.2022 for pre assessment questions. No answer.     Left message to return call to [phone] #[phone]    Discuss at home rapid antigen COVID test 1-2 days prior to procedure.    Arrival time: 0855    Facility location: [facility]    Sedation type: CS    Indication for procedure: screening colonoscopy    Bowel prep recommendation: Golytely d/t mag citrate recall    Prep instructions sent via [employer].  Prep prescription sent to    [facility] [id number] [place] [address]    Samra Maloney RN

## 2022-09-22 ENCOUNTER — ANESTHESIA (OUTPATIENT)
Dept: SURGERY | Facility: AMBULATORY SURGERY CENTER | Age: 52
End: 2022-09-22
Payer: COMMERCIAL

## 2022-09-22 ENCOUNTER — OFFICE VISIT (OUTPATIENT)
Dept: DERMATOLOGY | Facility: CLINIC | Age: 52
End: 2022-09-22
Payer: COMMERCIAL

## 2022-09-22 ENCOUNTER — HOSPITAL ENCOUNTER (OUTPATIENT)
Facility: AMBULATORY SURGERY CENTER | Age: 52
Discharge: HOME | End: 2022-09-22
Attending: STUDENT IN AN ORGANIZED HEALTH CARE EDUCATION/TRAINING PROGRAM
Payer: COMMERCIAL

## 2022-09-22 ENCOUNTER — ANESTHESIA EVENT (OUTPATIENT)
Dept: SURGERY | Facility: AMBULATORY SURGERY CENTER | Age: 52
End: 2022-09-22
Payer: COMMERCIAL

## 2022-09-22 VITALS
HEIGHT: 64 IN | WEIGHT: 105 LBS | TEMPERATURE: 97 F | BODY MASS INDEX: 17.93 KG/M2 | RESPIRATION RATE: 16 BRPM | DIASTOLIC BLOOD PRESSURE: 54 MMHG | SYSTOLIC BLOOD PRESSURE: 94 MMHG | OXYGEN SATURATION: 94 % | HEART RATE: 70 BPM

## 2022-09-22 VITALS — HEART RATE: 58 BPM

## 2022-09-22 DIAGNOSIS — D49.2 NEOPLASM OF UNSPECIFIED BEHAVIOR OF BONE, SOFT TISSUE, AND SKIN: ICD-10-CM

## 2022-09-22 DIAGNOSIS — D22.9 MULTIPLE BENIGN NEVI: ICD-10-CM

## 2022-09-22 DIAGNOSIS — Z87.898 HISTORY OF ATYPICAL NEVUS: ICD-10-CM

## 2022-09-22 DIAGNOSIS — B07.9 VERRUCA VULGARIS: ICD-10-CM

## 2022-09-22 DIAGNOSIS — L81.4 SOLAR LENTIGO: ICD-10-CM

## 2022-09-22 DIAGNOSIS — D18.01 CHERRY ANGIOMA: ICD-10-CM

## 2022-09-22 DIAGNOSIS — L82.1 SEBORRHEIC KERATOSIS: ICD-10-CM

## 2022-09-22 DIAGNOSIS — Z12.11 COLON CANCER SCREENING: Primary | ICD-10-CM

## 2022-09-22 DIAGNOSIS — L70.0 ACNE VULGARIS: ICD-10-CM

## 2022-09-22 DIAGNOSIS — L82.0 INFLAMED SEBORRHEIC KERATOSIS: Primary | ICD-10-CM

## 2022-09-22 DIAGNOSIS — Z85.828 HISTORY OF NONMELANOMA SKIN CANCER: ICD-10-CM

## 2022-09-22 LAB
COLONOSCOPY: NORMAL
HCG UR QL: NEGATIVE
INTERNAL QC OK POCT: NORMAL
POCT KIT EXPIRATION DATE: NORMAL
POCT KIT LOT NUMBER: NORMAL

## 2022-09-22 PROCEDURE — 81025 URINE PREGNANCY TEST: CPT | Performed by: PATHOLOGY

## 2022-09-22 PROCEDURE — 17110 DESTRUCTION B9 LES UP TO 14: CPT | Performed by: DERMATOLOGY

## 2022-09-22 PROCEDURE — 45378 DIAGNOSTIC COLONOSCOPY: CPT | Mod: 33

## 2022-09-22 PROCEDURE — 99213 OFFICE O/P EST LOW 20 MIN: CPT | Mod: 25 | Performed by: DERMATOLOGY

## 2022-09-22 PROCEDURE — 88305 TISSUE EXAM BY PATHOLOGIST: CPT | Mod: 26 | Performed by: DERMATOLOGY

## 2022-09-22 PROCEDURE — 88305 TISSUE EXAM BY PATHOLOGIST: CPT | Mod: TC | Performed by: DERMATOLOGY

## 2022-09-22 PROCEDURE — 11102 TANGNTL BX SKIN SINGLE LES: CPT | Mod: XS | Performed by: DERMATOLOGY

## 2022-09-22 PROCEDURE — 11103 TANGNTL BX SKIN EA SEP/ADDL: CPT | Mod: XS | Performed by: DERMATOLOGY

## 2022-09-22 RX ORDER — NALOXONE HYDROCHLORIDE 0.4 MG/ML
0.2 INJECTION, SOLUTION INTRAMUSCULAR; INTRAVENOUS; SUBCUTANEOUS
Status: CANCELLED | OUTPATIENT
Start: 2022-09-22

## 2022-09-22 RX ORDER — TRETINOIN 0.25 MG/G
CREAM TOPICAL
Qty: 45 G | Refills: 11 | Status: SHIPPED | OUTPATIENT
Start: 2022-09-22 | End: 2024-09-11

## 2022-09-22 RX ORDER — PROPOFOL 10 MG/ML
INJECTION, EMULSION INTRAVENOUS PRN
Status: DISCONTINUED | OUTPATIENT
Start: 2022-09-22 | End: 2022-09-22

## 2022-09-22 RX ORDER — PROCHLORPERAZINE MALEATE 10 MG
10 TABLET ORAL EVERY 6 HOURS PRN
Status: CANCELLED | OUTPATIENT
Start: 2022-09-22

## 2022-09-22 RX ORDER — PROPOFOL 10 MG/ML
INJECTION, EMULSION INTRAVENOUS CONTINUOUS PRN
Status: DISCONTINUED | OUTPATIENT
Start: 2022-09-22 | End: 2022-09-22

## 2022-09-22 RX ORDER — LIDOCAINE 40 MG/G
CREAM TOPICAL
Status: DISCONTINUED | OUTPATIENT
Start: 2022-09-22 | End: 2022-09-23 | Stop reason: HOSPADM

## 2022-09-22 RX ORDER — SODIUM CHLORIDE, SODIUM LACTATE, POTASSIUM CHLORIDE, CALCIUM CHLORIDE 600; 310; 30; 20 MG/100ML; MG/100ML; MG/100ML; MG/100ML
INJECTION, SOLUTION INTRAVENOUS CONTINUOUS
Status: DISCONTINUED | OUTPATIENT
Start: 2022-09-22 | End: 2022-09-23 | Stop reason: HOSPADM

## 2022-09-22 RX ORDER — LIDOCAINE HYDROCHLORIDE 20 MG/ML
INJECTION, SOLUTION INFILTRATION; PERINEURAL PRN
Status: DISCONTINUED | OUTPATIENT
Start: 2022-09-22 | End: 2022-09-22

## 2022-09-22 RX ORDER — NALOXONE HYDROCHLORIDE 0.4 MG/ML
0.4 INJECTION, SOLUTION INTRAMUSCULAR; INTRAVENOUS; SUBCUTANEOUS
Status: CANCELLED | OUTPATIENT
Start: 2022-09-22

## 2022-09-22 RX ORDER — ONDANSETRON 2 MG/ML
4 INJECTION INTRAMUSCULAR; INTRAVENOUS EVERY 6 HOURS PRN
Status: CANCELLED | OUTPATIENT
Start: 2022-09-22

## 2022-09-22 RX ORDER — ONDANSETRON 2 MG/ML
4 INJECTION INTRAMUSCULAR; INTRAVENOUS
Status: DISCONTINUED | OUTPATIENT
Start: 2022-09-22 | End: 2022-09-23 | Stop reason: HOSPADM

## 2022-09-22 RX ORDER — ONDANSETRON 4 MG/1
4 TABLET, ORALLY DISINTEGRATING ORAL EVERY 6 HOURS PRN
Status: CANCELLED | OUTPATIENT
Start: 2022-09-22

## 2022-09-22 RX ORDER — FLUMAZENIL 0.1 MG/ML
0.2 INJECTION, SOLUTION INTRAVENOUS
Status: CANCELLED | OUTPATIENT
Start: 2022-09-22 | End: 2022-09-22

## 2022-09-22 RX ADMIN — PROPOFOL 150 MCG/KG/MIN: 10 INJECTION, EMULSION INTRAVENOUS at 09:46

## 2022-09-22 RX ADMIN — SODIUM CHLORIDE, SODIUM LACTATE, POTASSIUM CHLORIDE, CALCIUM CHLORIDE: 600; 310; 30; 20 INJECTION, SOLUTION INTRAVENOUS at 09:00

## 2022-09-22 RX ADMIN — PROPOFOL 70 MG: 10 INJECTION, EMULSION INTRAVENOUS at 09:46

## 2022-09-22 RX ADMIN — PROPOFOL 30 MG: 10 INJECTION, EMULSION INTRAVENOUS at 09:48

## 2022-09-22 RX ADMIN — LIDOCAINE HYDROCHLORIDE 50 MG: 20 INJECTION, SOLUTION INFILTRATION; PERINEURAL at 09:46

## 2022-09-22 ASSESSMENT — PAIN SCALES - GENERAL: PAINLEVEL: NO PAIN (0)

## 2022-09-22 ASSESSMENT — LIFESTYLE VARIABLES: TOBACCO_USE: 1

## 2022-09-22 NOTE — NURSING NOTE
Chief Complaint   Patient presents with     Skin Check     Full body skin check - pt would like to follow up on spots treated from last visit     Miguel Samuels, EMT

## 2022-09-22 NOTE — H&P
Kay Ceja  [id number]  female  52 year old      Reason for procedure/surgery: screening colonoscopy    Patient Active Problem List   Diagnosis     Anxiety     Amenorrhea     Osteopenia     Senile sebaceous gland hyperplasia     Atypical nevi     Basal cell carcinoma of right forehead s/p mms 10-13-14     Pain in joint, pelvic region and thigh     Pain in right ankle     Peroneal tendinitis     Traumatic pneumothorax, initial encounter       Past Surgical History:    Past Surgical History:   Procedure Laterality Date     basal cell ca excision       BIOPSY      skin: moles and basal cell asmt     DENTAL SURGERY  2020    Dental implant     IR CHEST TUBE PLACEMENT NON-TUNNELED RIGHT  10/12/2021     MOHS MICROGRAPHIC PROCEDURE         Past Medical History:   Past Medical History:   Diagnosis Date     Anxiety      Basal cell carcinoma      Insomnia      Migraines      Osteopenia     T score -1.9 hip and back 11     Vasomotor rhinitis     uses nasalcort       Social History:   Social History     Tobacco Use     Smoking status: Former Smoker     Packs/day: 0.20     Years: 5.00     Pack years: 1.00     Types: Cigarettes     Quit date: 1999     Years since quittin.0     Smokeless tobacco: Never Used   Substance Use Topics     Alcohol use: No       Family History:   Family History   Problem Relation Age of Onset     Breast Cancer Mother      Osteoporosis Mother      Bipolar Disorder Father         suicide at age 42     Depression Brother         Suicide     C.A.D. Maternal Grandmother      C.A.D. Maternal Grandfather      Cancer No family hx of         skin cancer     Melanoma No family hx of      Skin Cancer No family hx of        Allergies: No Known Allergies    Active Medications:   Current Outpatient Medications   Medication Sig Dispense Refill     acetaZOLAMIDE (DIAMOX) 125 MG tablet Take 125mg bid starting day before altitude and then for 2-4 days while at altitude 30 tablet 1     bisacodyl  (DULCOLAX) 5 MG EC tablet Take as directed. One day before exam take 2 tablets at 3 PM. Take 2 tablets at 11 PM. 4 tablet 0     Calcium Carbonate-Vit D-Min (CALCIUM 1200 PO) Take 1 tablet by mouth daily Reported on 3/23/2017       ibuprofen (ADVIL/MOTRIN) 200 MG tablet Take 600 mg by mouth every 8 hours as needed for mild pain or moderate pain       methocarbamol (ROBAXIN) 750 MG tablet Take 1 tablet (750 mg) by mouth 3 times daily (Patient not taking: Reported on 11/3/2021) 10 tablet 0     Multiple Vitamins-Minerals (MULTIVITAMIN & MINERAL PO) Take 1 tablet by mouth every day       polyethylene glycol (GOLYTELY) 236 g suspension Take as directed. One day before exam fill the jug with water. Cover and shake until well mixed. At 6 PM start drinking an 8oz glass of mixture every 15 minutes until jug is 1/2 empty. Store remainder in the refrigerator.  At 11 PM Start drinking the other half of the Golytely jug. Drink one 8-ounce glass every 15 minutes until the jug is empty. 4000 mL 0     SUMAtriptan (IMITREX) 50 MG tablet Take 1 tablet (50 mg) by mouth at onset of headache for migraine for migraine. May repeat in 2 hours. Max 4 tablets/24 hours. For additional refills, please keep 7-25-22 clinic appt. 18 tablet 1     tretinoin (RETIN-A) 0.025 % external cream Apply a pea-size amount to entire face nightly at bedtime. 45 g 11       Systemic Review:   CONSTITUTIONAL: NEGATIVE for fever, chills, change in weight  ENT/MOUTH: NEGATIVE for ear, mouth and throat problems  RESP: NEGATIVE for significant cough or SOB  CV: NEGATIVE for chest pain, palpitations or peripheral edema    Physical Examination:   Vital Signs: There were no vitals taken for this visit.  GENERAL: healthy, alert and no distress  NECK: no adenopathy, no asymmetry, masses, or scars  RESP: lungs clear to auscultation - no rales, rhonchi or wheezes  CV: regular rate and rhythm, normal S1 S2, no S3 or S4, no murmur, click or rub, no peripheral edema and  peripheral pulses strong  ABDOMEN: soft, nontender, no hepatosplenomegaly, no masses and bowel sounds normal  MS: no gross musculoskeletal defects noted, no edema      Plan: Appropriate to proceed as scheduled.      Kay Moscoso MD  9/22/2022    PCP:  Kay Buck

## 2022-09-22 NOTE — ANESTHESIA POSTPROCEDURE EVALUATION
Patient: Kay Ceja    Procedure: Procedure(s):  COLONOSCOPY       Anesthesia Type:  MAC    Note:  Disposition: Outpatient   Postop Pain Control: Uneventful            Sign Out: Well controlled pain   PONV: No   Neuro/Psych: Uneventful            Sign Out: Acceptable/Baseline neuro status   Airway/Respiratory: Uneventful            Sign Out: Acceptable/Baseline resp. status   CV/Hemodynamics: Uneventful            Sign Out: Acceptable CV status; No obvious hypovolemia; No obvious fluid overload   Other NRE: NONE   DID A NON-ROUTINE EVENT OCCUR? No           Last vitals:  Vitals Value Taken Time   BP 94/54 09/22/22 1038   Temp 36.1  C (97  F) 09/22/22 1038   Pulse     Resp 16 09/22/22 1038   SpO2 94 % 09/22/22 1038       Electronically Signed By: Curtis Cowan MD  September 22, 2022  12:12 PM

## 2022-09-22 NOTE — LETTER
9/22/2022       RE: Kay Ceja  [address]  [facility] [address]     Dear Colleague,    Thank you for referring your patient, Kay Ceja, to the [facility]. Please see a copy of my visit note below.    [facility] Dermatology Note  Encounter Date: Sep 22, 2022  Office Visit     Dermatology Problem List:  1. History of NMSC:              - BCC, R superior forehead s/p MMS 10/2014              - BCC, R Sabianism, s/p MMS in [place] in 2012  2. Multiple (>100) atypical nevi on trunk and extremities. Prior bxs with moderate dysplasia.  3. AKs: LN2, field therapy states no reaction (per patient), but also documented as improved after field therapy with 5-FU 1-2x/week. S/P cryotherapy 3/1/22.   4. Flat warts, dorsal PIPs. Cryo 3/14/16, 3/1/22, 9/22/22  5. Acne: tretinoin 0.025%  6. Sebaceous hyperplasia. tretinoin 0.025% cream.  7. 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 Resolved with TMC 0.1% cream  ____________________________________________    Assessment & Plan:    # NUB, right shoulder. Solar lentigo vs lentigo maligna.   -Shave biopsy performed today, see procedure note below.  -Photographs taken today.   -Follow up pending path.     # NUB, left lower thigh. Asymmetric border, dyspigmentation; congenital nevi vs melanoma.   -Shave biopsy performed today, see procedure note below.  -Photographs taken today.   -Follow up pending path.      # Seborrheic keratosis, inflamed. Back, forehead, left medial brow.   -Cryotherapy performed today, see procedure note below.     # Flat warts, dorsal PIPs  Counseled on the nature of this skin condition and treatment options.  - Previous cryotherapy 3/14/16, 3/1/22.   - Cryotherapy performed today, see procedure note below.    # Acne vulgaris, chronic, stable. Managed with tretinoin 0.025% cream.   -Refills provided today.      # Hx NMSC. NERD.   # Hx atypical nevi. NERD.   - ABCDs of melanoma were discussed and self skin checks were advised.  - Sun precaution was advised including the use of sun screens of SPF 30 or higher, sun protective clothing, and avoidance of tanning beds.     # Benign lesions: Multiple benign nevi, solar lentigos, seborrheic keratoses. Explained to patient benign nature of lesion. No treatment is necessary at this time unless the lesion changes or becomes symptomatic.   - ABCDs of melanoma were discussed and self skin checks were advised.  - Sun precaution was advised including the use of sun screens of SPF 30 or higher, sun protective clothing, and avoidance of tanning beds.    Procedures Performed:   - Cryotherapy procedure note, location(s): forehead, back, finger. After verbal consent and discussion of risks and benefits including, but not limited to, dyspigmentation/scar, blister, and pain, 5 lesion(s) was(were) treated with 1-2 mm freeze border for 1-2 cycles with liquid nitrogen. Post cryotherapy instructions were provided.    - Shave biopsy procedure note, location(s): left lower thigh, right shoulder. After discussion of benefits and risks including but not limited to bleeding, infection, scar, incomplete removal, recurrence, and non-diagnostic biopsy, written consent and photographs were obtained. The area was cleaned with isopropyl alcohol. 0.5mL of 1% lidocaine with epinephrine was injected to obtain adequate anesthesia of lesion(s). Shave biopsy at site(s) performed. Hemostasis was achieved with aluminium chloride. Petrolatum ointment and a sterile dressing were applied. The patient tolerated the procedure and no complications were noted. The patient was provided with verbal and written post care instructions.     Follow-up: pending path results    Staff, Medical Student and Scribe:     Scribe Disclosure:  DAIANA GOLDSTEIN, am serving as a scribe to document services personally performed by  Joel Bradshaw MD based on data collection and the provider's statements to me.     Lori Gupta, MS3     Provider Disclosure:   The documentation recorded by the scribe accurately reflects the services I personally performed and the decisions made by me.    Staff Physician:  I was present with the medical student who participated in the service and in the documentation of the note. I have verified the history and personally performed the physical exam and medical decision making. I agree with the assessment and plan of care as documented in the note. The procedure(s) was(were) performed by myself.    Joel Bradshaw MD  Pronouns: he/him/his    Department of Dermatology  [facility]: Phone: [phone], Fax:[phone]  [facility]: Phone: [phone] Fax: [phone]  ____________________________________________    CC: Skin Check (Full body skin check - pt would like to follow up on spots treated from last visit)    HPI:  Ms. Kay Ceja is a(n) 52 year old female who presents today as a return patient for FBSE. Last seen by myself on 3/1/22, at which time patient was started on tretinoin 0.025% cream. Additionally, patient underwent cryotherapy for treatment of AKs on the right forehead, left medial brow, and dorsal bridge of nose, and flat warts on the dorsal PIPs.    Today, she has a spot of concern on her back and forehead. She notices that these spots are itchy and will sometimes bleed if she scratches them. She denies any other spots that have been changing in color, size, or texture. She denies any spots that are slow to heal or bleed, other than those previously mentioned. She wears sunscreen regularly. She has no family history of skin cancer. She has had two NMSC, in 2012 and 2014. She has had previous bx with moderate dysplasia.     She also notices that her warts previously  treated with cryotherapy have persisted and is interested in repeat cryotherapy today.     She reports that her acne is well controlled with the tretinoin 0.025% cream and requests a refill.     Patient is otherwise feeling well, without additional skin concerns.    Labs Reviewed:  N/A    Physical Exam:  Vitals: There were no vitals taken for this visit.  SKIN: Full skin, which includes the head/face, both arms, chest, back, abdomen,both legs, genitalia and/or groin buttocks, digits and/or nails, was examined.  -On back, brown papule with stuck on appearance. Erythematous base and signs of excoriation.  -On left medial eyebrow and forehead,  Flesh colored papules with rough texture and stuck on appearance.   - On right shoulder, pigmented macule with papillar appearance on dermoscopy.    - On left lower thigh, pigmented macule with asymmetry and variations in pigmentation.   - There are waxy stuck on tan to brown papules on the back and chest.   - Multiple regular brown pigmented macules and papules are identified on the trunk and extremities.   - Scattered brown macules on sun exposed areas..   - No other lesions of concern on areas examined.     Medications:  Current Outpatient Medications   Medication     Calcium Carbonate-Vit D-Min (CALCIUM 1200 PO)     ibuprofen (ADVIL/MOTRIN) 200 MG tablet     Multiple Vitamins-Minerals (MULTIVITAMIN & MINERAL PO)     SUMAtriptan (IMITREX) 50 MG tablet     tretinoin (RETIN-A) 0.025 % external cream     acetaZOLAMIDE (DIAMOX) 125 MG tablet     methocarbamol (ROBAXIN) 750 MG tablet     No current facility-administered medications for this visit.     Facility-Administered Medications Ordered in Other Visits   Medication     lactated ringers infusion     lactated ringers infusion     lidocaine (LMX4) kit     lidocaine (LMX4) kit     lidocaine 1 % 0.1-1 mL     lidocaine 1 % 0.1-1 mL     ondansetron (ZOFRAN) injection 4 mg     sodium chloride (PF) 0.9% PF flush 3 mL     sodium  chloride (PF) 0.9% PF flush 3 mL     sodium chloride (PF) 0.9% PF flush 3 mL     sodium chloride (PF) 0.9% PF flush 3 mL      Past Medical History:   Patient Active Problem List   Diagnosis     Anxiety     Amenorrhea     Osteopenia     Senile sebaceous gland hyperplasia     Atypical nevi     Basal cell carcinoma of right forehead s/p mms 10-13-14     Pain in joint, pelvic region and thigh     Pain in right ankle     Peroneal tendinitis     Traumatic pneumothorax, initial encounter     Past Medical History:   Diagnosis Date     Anxiety      Basal cell carcinoma      Insomnia      Migraines      Osteopenia     T score -1.9 hip and back 1/19/11     Vasomotor rhinitis     uses nasalcort

## 2022-09-22 NOTE — ANESTHESIA CARE TRANSFER NOTE
Patient: Kay Ceja    Procedure: Procedure(s):  COLONOSCOPY       Diagnosis: Annual physical exam [Z00.00]  Diagnosis Additional Information: No value filed.    Anesthesia Type:   No value filed.     Note:    Oropharynx: oropharynx clear of all foreign objects and spontaneously breathing  Level of Consciousness: awake and drowsy  Oxygen Supplementation: room air    Independent Airway: airway patency satisfactory and stable  Dentition: dentition unchanged  Vital Signs Stable: post-procedure vital signs reviewed and stable  Report to RN Given: handoff report given  Patient transferred to: Phase II    Handoff Report: Identifed the Patient, Identified the Reponsible Provider, Reviewed the pertinent medical history, Discussed the surgical course, Reviewed Intra-OP anesthesia mangement and issues during anesthesia, Set expectations for post-procedure period and Allowed opportunity for questions and acknowledgement of understanding      Vitals:  Vitals Value Taken Time   BP     Temp     Pulse     Resp     SpO2         Electronically Signed By: KESHIA Chacko CRNA  September 22, 2022  10:37 AM

## 2022-09-22 NOTE — PROGRESS NOTES
[facility] Dermatology Note  Encounter Date: Sep 22, 2022  Office Visit     Dermatology Problem List:  1. History of NMSC:              - BCC, R superior forehead s/p MMS 10/2014              - BCC, R Rastafari, s/p MMS in [place] in 2012  2. Multiple (>100) atypical nevi on trunk and extremities. Prior bxs with moderate dysplasia.  3. AKs: LN2, field therapy states no reaction (per patient), but also documented as improved after field therapy with 5-FU 1-2x/week. S/P cryotherapy 3/1/22.   4. Flat warts, dorsal PIPs. Cryo 3/14/16, 3/1/22, 9/22/22  5. Acne: tretinoin 0.025%  6. Sebaceous hyperplasia. tretinoin 0.025% cream.  7. 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 Resolved with TMC 0.1% cream  ____________________________________________    Assessment & Plan:    # NUB, right shoulder. Solar lentigo vs lentigo maligna.   -Shave biopsy performed today, see procedure note below.  -Photographs taken today.   -Follow up pending path.     # NUB, left lower thigh. Asymmetric border, dyspigmentation; congenital nevi vs melanoma.   -Shave biopsy performed today, see procedure note below.  -Photographs taken today.   -Follow up pending path.      # Seborrheic keratosis, inflamed. Back, forehead, left medial brow.   -Cryotherapy performed today, see procedure note below.     # Flat warts, dorsal PIPs  Counseled on the nature of this skin condition and treatment options.  - Previous cryotherapy 3/14/16, 3/1/22.   - Cryotherapy performed today, see procedure note below.    # Acne vulgaris, chronic, stable. Managed with tretinoin 0.025% cream.   -Refills provided today.     # Hx NMSC. NERD.   # Hx atypical nevi. NERD.   - ABCDs of melanoma were discussed and self skin checks were advised.  - Sun precaution was advised including the use of sun screens of SPF 30 or higher, sun protective clothing, and avoidance of tanning beds.     # Benign lesions: Multiple benign nevi, solar  lentigos, seborrheic keratoses. Explained to patient benign nature of lesion. No treatment is necessary at this time unless the lesion changes or becomes symptomatic.   - ABCDs of melanoma were discussed and self skin checks were advised.  - Sun precaution was advised including the use of sun screens of SPF 30 or higher, sun protective clothing, and avoidance of tanning beds.    Procedures Performed:   - Cryotherapy procedure note, location(s): forehead, back, finger. After verbal consent and discussion of risks and benefits including, but not limited to, dyspigmentation/scar, blister, and pain, 5 lesion(s) was(were) treated with 1-2 mm freeze border for 1-2 cycles with liquid nitrogen. Post cryotherapy instructions were provided.    - Shave biopsy procedure note, location(s): left lower thigh, right shoulder. After discussion of benefits and risks including but not limited to bleeding, infection, scar, incomplete removal, recurrence, and non-diagnostic biopsy, written consent and photographs were obtained. The area was cleaned with isopropyl alcohol. 0.5mL of 1% lidocaine with epinephrine was injected to obtain adequate anesthesia of lesion(s). Shave biopsy at site(s) performed. Hemostasis was achieved with aluminium chloride. Petrolatum ointment and a sterile dressing were applied. The patient tolerated the procedure and no complications were noted. The patient was provided with verbal and written post care instructions.     Follow-up: pending path results    Staff, Medical Student and Scribe:     Scribe Disclosure:  IDAIANA, am serving as a scribe to document services personally performed by Joel Bradshaw MD based on data collection and the provider's statements to me.     Lori Gupta MS3     Provider Disclosure:   The documentation recorded by the scribe accurately reflects the services I personally performed and the decisions made by me.    Staff Physician:  I was present with the medical  student who participated in the service and in the documentation of the note. I have verified the history and personally performed the physical exam and medical decision making. I agree with the assessment and plan of care as documented in the note. The procedure(s) was(were) performed by myself.    Joel Bradshaw MD  Pronouns: he/him/his    Department of Dermatology  [facility] [place]: Phone: [phone], Fax:[phone]  [facility]: Phone: [phone] Fax: [phone]  ____________________________________________    CC: Skin Check (Full body skin check - pt would like to follow up on spots treated from last visit)    HPI:  Ms. Kay Ceja is a(n) 52 year old female who presents today as a return patient for FBSE. Last seen by myself on 3/1/22, at which time patient was started on tretinoin 0.025% cream. Additionally, patient underwent cryotherapy for treatment of AKs on the right forehead, left medial brow, and dorsal bridge of nose, and flat warts on the dorsal PIPs.    Today, she has a spot of concern on her back and forehead. She notices that these spots are itchy and will sometimes bleed if she scratches them. She denies any other spots that have been changing in color, size, or texture. She denies any spots that are slow to heal or bleed, other than those previously mentioned. She wears sunscreen regularly. She has no family history of skin cancer. She has had two NMSC, in 2012 and 2014. She has had previous bx with moderate dysplasia.     She also notices that her warts previously treated with cryotherapy have persisted and is interested in repeat cryotherapy today.     She reports that her acne is well controlled with the tretinoin 0.025% cream and requests a refill.     Patient is otherwise feeling well, without additional skin concerns.    Labs Reviewed:  N/A    Physical  Exam:  Vitals: There were no vitals taken for this visit.  SKIN: Full skin, which includes the head/face, both arms, chest, back, abdomen,both legs, genitalia and/or groin buttocks, digits and/or nails, was examined.  -On back, brown papule with stuck on appearance. Erythematous base and signs of excoriation.  -On left medial eyebrow and forehead,  Flesh colored papules with rough texture and stuck on appearance.   - On right shoulder, pigmented macule with papillar appearance on dermoscopy.    - On left lower thigh, pigmented macule with asymmetry and variations in pigmentation.   - There are waxy stuck on tan to brown papules on the back and chest.   - Multiple regular brown pigmented macules and papules are identified on the trunk and extremities.   - Scattered brown macules on sun exposed areas..   - No other lesions of concern on areas examined.     Medications:  Current Outpatient Medications   Medication     Calcium Carbonate-Vit D-Min (CALCIUM 1200 PO)     ibuprofen (ADVIL/MOTRIN) 200 MG tablet     Multiple Vitamins-Minerals (MULTIVITAMIN & MINERAL PO)     SUMAtriptan (IMITREX) 50 MG tablet     tretinoin (RETIN-A) 0.025 % external cream     acetaZOLAMIDE (DIAMOX) 125 MG tablet     methocarbamol (ROBAXIN) 750 MG tablet     No current facility-administered medications for this visit.     Facility-Administered Medications Ordered in Other Visits   Medication     lactated ringers infusion     lactated ringers infusion     lidocaine (LMX4) kit     lidocaine (LMX4) kit     lidocaine 1 % 0.1-1 mL     lidocaine 1 % 0.1-1 mL     ondansetron (ZOFRAN) injection 4 mg     sodium chloride (PF) 0.9% PF flush 3 mL     sodium chloride (PF) 0.9% PF flush 3 mL     sodium chloride (PF) 0.9% PF flush 3 mL     sodium chloride (PF) 0.9% PF flush 3 mL      Past Medical History:   Patient Active Problem List   Diagnosis     Anxiety     Amenorrhea     Osteopenia     Senile sebaceous gland hyperplasia     Atypical nevi     Basal cell  carcinoma of right forehead s/p mms 10-13-14     Pain in joint, pelvic region and thigh     Pain in right ankle     Peroneal tendinitis     Traumatic pneumothorax, initial encounter     Past Medical History:   Diagnosis Date     Anxiety      Basal cell carcinoma      Insomnia      Migraines      Osteopenia     T score -1.9 hip and back 1/19/11     Vasomotor rhinitis     uses nasalcort

## 2022-09-22 NOTE — ANESTHESIA PREPROCEDURE EVALUATION
Anesthesia Pre-Procedure Evaluation    Patient: Kay Cjea   MRN: [id number] : 1970        Procedure : Procedure(s):  COLONOSCOPY          Past Medical History:   Diagnosis Date     Anxiety      Basal cell carcinoma      Insomnia      Migraines      Osteopenia     T score -1.9 hip and back 11     Vasomotor rhinitis     uses nasalcort      Past Surgical History:   Procedure Laterality Date     basal cell ca excision       BIOPSY  2012    skin: moles and basal cell asmt     DENTAL SURGERY  2020    Dental implant     IR CHEST TUBE PLACEMENT NON-TUNNELED RIGHT  10/12/2021     MOHS MICROGRAPHIC PROCEDURE        No Known Allergies   Social History     Tobacco Use     Smoking status: Former Smoker     Packs/day: 0.20     Years: 5.00     Pack years: 1.00     Types: Cigarettes     Quit date: 1999     Years since quittin.0     Smokeless tobacco: Never Used   Substance Use Topics     Alcohol use: No      Wt Readings from Last 1 Encounters:   22 47.6 kg (105 lb)        Anesthesia Evaluation            ROS/MED HX  ENT/Pulmonary:     (+) tobacco use, Past use,     Neurologic:  - neg neurologic ROS     Cardiovascular:  - neg cardiovascular ROS     METS/Exercise Tolerance:     Hematologic:  - neg hematologic  ROS     Musculoskeletal:  - neg musculoskeletal ROS     GI/Hepatic:  - neg GI/hepatic ROS     Renal/Genitourinary:  - neg Renal ROS     Endo:  - neg endo ROS     Psychiatric/Substance Use:     (+) psychiatric history anxiety     Infectious Disease:  - neg infectious disease ROS     Malignancy:  - neg malignancy ROS     Other:  - neg other ROS          Physical Exam    Airway  airway exam normal           Respiratory Devices and Support         Dental  no notable dental history         Cardiovascular   cardiovascular exam normal          Pulmonary   pulmonary exam normal                OUTSIDE LABS:  CBC:   Lab Results   Component Value Date    WBC 7.9 10/12/2021    WBC 5.9 10/11/2021    HGB  11.7 10/12/2021    HGB 13.0 10/11/2021    HCT 36.8 10/12/2021    HCT 40.4 10/11/2021     10/12/2021     10/11/2021     BMP:   Lab Results   Component Value Date     10/12/2021     10/11/2021    POTASSIUM 3.7 10/12/2021    POTASSIUM 3.8 10/11/2021    CHLORIDE 107 10/12/2021    CHLORIDE 103 10/11/2021    CO2 23 10/12/2021    CO2 32 10/11/2021    BUN 10 10/12/2021    BUN 11 10/11/2021    CR 0.62 10/12/2021    CR 0.61 10/11/2021     (H) 10/12/2021    GLC 97 10/11/2021     COAGS:   Lab Results   Component Value Date    PTT 26 10/11/2021    INR 0.88 10/11/2021     POC:   Lab Results   Component Value Date    HCG Negative 09/22/2022     HEPATIC:   Lab Results   Component Value Date    ALBUMIN 4.6 03/16/2012    PROTTOTAL 7.7 03/16/2012    ALT 48 09/25/2015    AST 35 09/25/2015    ALKPHOS 54 03/16/2012    BILITOTAL 0.8 03/16/2012     OTHER:   Lab Results   Component Value Date    A1C 5.5 04/09/2015    DONALD 9.0 10/12/2021    PHOS 4.0 10/12/2021    MAG 1.9 10/12/2021    TSH 1.36 09/28/2020    CRP <2.9 09/25/2015    SED 9 06/29/2020       Anesthesia Plan    ASA Status:  2   NPO Status:  NPO Appropriate    Anesthesia Type: MAC.     - Reason for MAC: immobility needed   Induction: Intravenous.   Maintenance: TIVA.        Consents    Anesthesia Plan(s) and associated risks, benefits, and realistic alternatives discussed. Questions answered and patient/representative(s) expressed understanding.    - Discussed:     - Discussed with:  Patient      - Extended Intubation/Ventilatory Support Discussed: No.      - Patient is DNR/DNI Status: No    Use of blood products discussed: No .     Postoperative Care       PONV prophylaxis: Background Propofol Infusion     Comments:                Curtis Cowan MD

## 2022-09-26 LAB
PATH REPORT.COMMENTS IMP SPEC: ABNORMAL
PATH REPORT.COMMENTS IMP SPEC: ABNORMAL
PATH REPORT.COMMENTS IMP SPEC: YES
PATH REPORT.FINAL DX SPEC: ABNORMAL
PATH REPORT.GROSS SPEC: ABNORMAL
PATH REPORT.MICROSCOPIC SPEC OTHER STN: ABNORMAL
PATH REPORT.RELEVANT HX SPEC: ABNORMAL

## 2022-09-27 ENCOUNTER — TELEPHONE (OUTPATIENT)
Dept: DERMATOLOGY | Facility: CLINIC | Age: 52
End: 2022-09-27

## 2022-09-27 DIAGNOSIS — D03.9 MELANOMA IN SITU (H): Primary | ICD-10-CM

## 2022-09-27 NOTE — TELEPHONE ENCOUNTER
Called patient to discuss results; she expressed understanding and agreed to plan. Could we place derm surg referral and schedule for excision x 1 for lentigo maligna on the R shoulder. Also follow-up with me in 6 months for another skin check. Thanks!

## 2022-09-27 NOTE — RESULT ENCOUNTER NOTE
Called patient to discuss results; she expressed understanding and agreed to plan. Could we place derm surg referral and schedule for excision x 1 for lentigo maligna on the R shoulder. Also follow-up with me in 6 months for another skin check. Thanks!    Joel Bradshaw MD  Pronouns: he/him/his    Department of Dermatology  [facility]: Phone: [phone], Fax:[phone]  [facility]: Phone: [phone] Fax: [phone]

## 2022-09-28 ENCOUNTER — TELEPHONE (OUTPATIENT)
Dept: DERMATOLOGY | Facility: CLINIC | Age: 52
End: 2022-09-28

## 2022-09-28 NOTE — TELEPHONE ENCOUNTER
Left patient a voicemail to schedule a consult & mohs for Melanoma In Situ, Right Shoulder with Dr. Rojas. Provided my direct phone number.    Stephanie Lewis on 9/28/2022 at 9:46 AM

## 2022-09-28 NOTE — TELEPHONE ENCOUNTER
Called patient to schedule surgery with Dr. Rojas    Date of Surgery: TBD    Surgery type: mohs vs excision    Consult scheduled: Yes    Has patient had mohs before? No    Additional comments: will need a melanoma slot once patient has a consult      Stephanie Lewis on 9/28/2022 at 10:41 AM

## 2022-10-06 ENCOUNTER — ANCILLARY PROCEDURE (OUTPATIENT)
Dept: BONE DENSITY | Facility: CLINIC | Age: 52
End: 2022-10-06
Attending: FAMILY MEDICINE
Payer: COMMERCIAL

## 2022-10-06 DIAGNOSIS — M85.89 OSTEOPENIA OF MULTIPLE SITES: ICD-10-CM

## 2022-10-06 PROCEDURE — 77080 DXA BONE DENSITY AXIAL: CPT | Performed by: INTERNAL MEDICINE

## 2022-10-12 ENCOUNTER — PRE VISIT (OUTPATIENT)
Dept: DERMATOLOGY | Facility: CLINIC | Age: 52
End: 2022-10-12

## 2022-10-12 ENCOUNTER — VIRTUAL VISIT (OUTPATIENT)
Dept: DERMATOLOGY | Facility: CLINIC | Age: 52
End: 2022-10-12
Payer: COMMERCIAL

## 2022-10-12 DIAGNOSIS — D03.61 MELANOMA IN SITU OF RIGHT UPPER EXTREMITY INCLUDING SHOULDER (H): Primary | ICD-10-CM

## 2022-10-12 PROCEDURE — 99213 OFFICE O/P EST LOW 20 MIN: CPT | Mod: GC | Performed by: DERMATOLOGY

## 2022-10-12 ASSESSMENT — PAIN SCALES - GENERAL: PAINLEVEL: NO PAIN (0)

## 2022-10-12 NOTE — LETTER
10/12/2022       RE: Kay Ceja  [address]  [place] [address]     Dear Colleague,    Thank you for referring your patient, Kay Ceja, to the [facility]MAT[facility] [place] [facility]. Please see a copy of my visit note below.    Dermatologic Surgery Clinic Note - Teledermatology Visit    Oct 12, 2022  Start time: 2:35 p.m.  End time: 2:45 p.m.    Dermatology Problem List:  1. Melanoma in situ- lentigo maligna type, R shoulder, s/p bbx 9/22/22  2. History of NMSC:              - BCC, R superior forehead s/p MMS 10/2014              - BCC, R [employer], s/p MMS in [place] in 2012  3. Multiple (>100) atypical nevi on trunk and extremities. Prior bxs with moderate dysplasia. Most recent 9/22/22  4. AKs: LN2, field therapy states no reaction (per patient), but also documented as improved after field therapy with 5-FU 1-2x/week. S/P cryotherapy 3/1/22.   5. Flat warts, dorsal PIPs. Cryo 3/14/16, 3/1/22, 9/22/22  6. Acne: tretinoin 0.025%  7. Sebaceous hyperplasia. tretinoin 0.025% cream.  8. 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 Resolved with TMC 0.1% cream    Subjective: The patient is a 52 year old woman who presents today for newly diagnosed melanoma.    Skin cancer(s): Melanoma in situ  Location(s): Right shoulder  Associated symptoms: none  Onset: within last 1 year    No other associated symptoms, modifying factors, or prior treatments, except when noted above. The patient denies any constitutional symptoms, lymphadenopathy, unintentional weight loss or decreased appetite. No other skin concerns today.    Objective:   Skin: Focused examination of the R shoulder within the teledermatology photograph(s) on 9/22/22 was performed.   - 2 cm irregular patch with ill-defined pigment present on R shoulder    Assessment and Plan:      1. Plan for Mohs micrographic surgery for skin cancer(s) above:  - We  discussed the nature of the diagnosis/condition above. We discussed the treatment options, including the risks benefits and expectations of these options. We recommend micrographic surgery as the most effective and most tissue sparing option for treatment, and the patient agrees to proceed with this.  The patient is aware of the risks, benefits and expectations of this procedure. The patient will be scheduled for this procedure, if not already done so.  - We anticipate the following closure type: Primary closure    The patient was discussed with and evaluated by attending physician, Lio Rojas MD.    Oz Loredo MD  Dermatology, PGY-5  Mohs surgery fellow    Scribe Disclosure:  I, Mal Duncan, am serving as a scribe to document services personally performed by Lio Rojas MD based on data collection and the provider's statements to me.     Attending Attestation  I attest that the Fellow recorded the interview and exam that I personally performed.  I have reviewed the note and edited it as necessary.    Lio Rojas M.D.  Professor  Director of Dermatologic Surgery  Department of Dermatology  [facility]

## 2022-10-12 NOTE — PROGRESS NOTES
Dermatologic Surgery Clinic Note - Teledermatology Visit    Oct 12, 2022  Start time: 2:35 p.m.  End time: 2:45 p.m.    Dermatology Problem List:  1. Melanoma in situ- lentigo maligna type, R shoulder, s/p bbx 9/22/22  2. History of NMSC:              - BCC, R superior forehead s/p MMS 10/2014              - BCC, R [employer], s/p MMS in [place] in 2012  3. Multiple (>100) atypical nevi on trunk and extremities. Prior bxs with moderate dysplasia. Most recent 9/22/22  4. AKs: LN2, field therapy states no reaction (per patient), but also documented as improved after field therapy with 5-FU 1-2x/week. S/P cryotherapy 3/1/22.   5. Flat warts, dorsal PIPs. Cryo 3/14/16, 3/1/22, 9/22/22  6. Acne: tretinoin 0.025%  7. Sebaceous hyperplasia. tretinoin 0.025% cream.  8. 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 Resolved with TMC 0.1% cream    Subjective: The patient is a 52 year old woman who presents today for newly diagnosed melanoma.    Skin cancer(s): Melanoma in situ  Location(s): Right shoulder  Associated symptoms: none  Onset: within last 1 year    No other associated symptoms, modifying factors, or prior treatments, except when noted above. The patient denies any constitutional symptoms, lymphadenopathy, unintentional weight loss or decreased appetite. No other skin concerns today.    Objective:   Skin: Focused examination of the R shoulder within the teledermatology photograph(s) on 9/22/22 was performed.   - 2 cm irregular patch with ill-defined pigment present on R shoulder    Assessment and Plan:      1. Plan for Mohs micrographic surgery for skin cancer(s) above:  - We discussed the nature of the diagnosis/condition above. We discussed the treatment options, including the risks benefits and expectations of these options. We recommend micrographic surgery as the most effective and most tissue sparing option for treatment, and the patient agrees to proceed with this.  The patient is  aware of the risks, benefits and expectations of this procedure. The patient will be scheduled for this procedure, if not already done so.  - We anticipate the following closure type: Primary closure    The patient was discussed with and evaluated by attending physician, Lio Rojas MD.    Oz Loredo MD  Dermatology, PGY-5  Mohs surgery fellow    Scribe Disclosure:  I, Mal Duncan, am serving as a scribe to document services personally performed by Lio Rojas MD based on data collection and the provider's statements to me.     Attending Attestation  I attest that the Fellow recorded the interview and exam that I personally performed.  I have reviewed the note and edited it as necessary.    Lio Rojas M.D.  Professor  Director of Dermatologic Surgery  Department of Dermatology  [facility]

## 2022-10-12 NOTE — NURSING NOTE
Chief Complaint   Patient presents with     Derm Problem     Patient is here today to have a telephone visit regarding right shoulder melanoma in situ.     Megan FLOREZ RN

## 2022-10-17 ENCOUNTER — TELEPHONE (OUTPATIENT)
Dept: DERMATOLOGY | Facility: CLINIC | Age: 52
End: 2022-10-17

## 2022-10-17 NOTE — TELEPHONE ENCOUNTER
[facility] Call Center    Phone Message    May a detailed message be left on voicemail: yes     Reason for Call: Other: Patient had a tele appointment with Dr. Rojas on 10/12/2022 and was told she would be scheduled for a MOHS procedure but she has not heard from anyone re when the procedure will be.    Please call back to discuss.    Thank you    Action Taken: Message routed to:  [facility] ([facility] Derm Surg    Travel Screening: Not Applicable

## 2022-10-18 NOTE — TELEPHONE ENCOUNTER
Patient called back and stated she will be out of town from 10/28-11/17. Scheduled for 11/21    Stephanie Lewis, Surgery Scheduler 10/18/2022 at 10:12 AM

## 2022-10-25 ENCOUNTER — NURSE TRIAGE (OUTPATIENT)
Dept: NURSING | Facility: CLINIC | Age: 52
End: 2022-10-25

## 2022-10-25 ENCOUNTER — OFFICE VISIT (OUTPATIENT)
Dept: URGENT CARE | Facility: URGENT CARE | Age: 52
End: 2022-10-25
Payer: COMMERCIAL

## 2022-10-25 ENCOUNTER — TELEPHONE (OUTPATIENT)
Dept: OBGYN | Facility: CLINIC | Age: 52
End: 2022-10-25

## 2022-10-25 VITALS
SYSTOLIC BLOOD PRESSURE: 108 MMHG | HEART RATE: 64 BPM | TEMPERATURE: 97.9 F | OXYGEN SATURATION: 100 % | DIASTOLIC BLOOD PRESSURE: 76 MMHG

## 2022-10-25 DIAGNOSIS — G43.011 INTRACTABLE MIGRAINE WITHOUT AURA AND WITH STATUS MIGRAINOSUS: Primary | ICD-10-CM

## 2022-10-25 DIAGNOSIS — R11.0 NAUSEA: ICD-10-CM

## 2022-10-25 PROBLEM — M84.373A STRESS FRACTURE OF ANKLE: Status: ACTIVE | Noted: 2020-05-01

## 2022-10-25 PROBLEM — R60.9 EDEMA: Status: ACTIVE | Noted: 2019-09-01

## 2022-10-25 PROCEDURE — 96372 THER/PROPH/DIAG INJ SC/IM: CPT | Performed by: NURSE PRACTITIONER

## 2022-10-25 PROCEDURE — 99214 OFFICE O/P EST MOD 30 MIN: CPT | Mod: 25 | Performed by: NURSE PRACTITIONER

## 2022-10-25 RX ORDER — KETOROLAC TROMETHAMINE 30 MG/ML
30 INJECTION, SOLUTION INTRAMUSCULAR; INTRAVENOUS ONCE
Status: COMPLETED | OUTPATIENT
Start: 2022-10-25 | End: 2022-10-25

## 2022-10-25 RX ORDER — ONDANSETRON 4 MG/1
4 TABLET, ORALLY DISINTEGRATING ORAL EVERY 8 HOURS PRN
Qty: 10 TABLET | Refills: 0 | Status: SHIPPED | OUTPATIENT
Start: 2022-10-25

## 2022-10-25 RX ORDER — ONDANSETRON 4 MG/1
4 TABLET, ORALLY DISINTEGRATING ORAL ONCE
Status: COMPLETED | OUTPATIENT
Start: 2022-10-25 | End: 2022-10-25

## 2022-10-25 RX ADMIN — ONDANSETRON 4 MG: 4 TABLET, ORALLY DISINTEGRATING ORAL at 16:13

## 2022-10-25 RX ADMIN — KETOROLAC TROMETHAMINE 30 MG: 30 INJECTION, SOLUTION INTRAMUSCULAR; INTRAVENOUS at 16:13

## 2022-10-25 NOTE — TELEPHONE ENCOUNTER
"Nurse Triage SBAR    Is this a 2nd Level Triage? YES, LICENSED PRACTITIONER REVIEW IS REQUIRED    Situation: Patient calling about a migraine that she developed yesterday that is not responding to Imitrex. Protocol recommends callback by PCP within 1 hour Consent: not needed    Background: Patient has taken Imitrex for migraine for the last 2 years. Took a dose yesterday with no improvement. Vomited last night and was able to fall asleep. Migraine improved overnight, but not resolved. Woke up this morning and took another dose of Imitrex, but migraine still not resolved. Was encouraged to go to ED but is asking if Urgent care would be another option or if there is another medication she could try.    Assessment:   Nausea and vomiting  Sipping fluids, but not much  Rates pain 6/10 today  Yesterday 9/10 - \"worst pain of life\"  No fever  No dizziness or vision changes      Protocol Recommended Disposition:   Call back by PCP within 1 hour    Recommendation: Advised patient that message will be forwarded to provider and to wait for a call back within an hour. Call back if no call received. Care advice given. Patient verbalized understanding and agreed with plan.     Routed message to provider.    Does the patient meet one of the following criteria for ADS visit consideration? 16+ years old, with an [facility] PCP     TIP  Providers, please consider if this condition is appropriate for management at one of our Acute and Diagnostic Services sites.     If patient is a good candidate, please use dotphrase <dot>triageresponse and select Refer to ADS to document.     Smitha Pineda RN [facility] 10/25/2022 12:00 PM    Reason for Disposition    SEVERE headache (e.g., excruciating) and has had severe headaches before    Additional Information    Negative: Difficult to awaken or acting confused (e.g., disoriented, slurred speech)    Negative: Weakness of the face, arm or leg on one side of the body and new-onset    " Negative: Numbness of the face, arm or leg on one side of the body and new-onset    Negative: Loss of speech or garbled speech and new-onset    Negative: Passed out (i.e., fainted, collapsed and was not responding)    Negative: Sounds like a life-threatening emergency to the triager    Negative: Followed a head injury within last 3 days    Negative: Traumatic Brain Injury (TBI) is suspected    Negative: Sinus pain of forehead and yellow or green nasal discharge    Negative: Pregnant    Negative: Unable to walk without falling    Negative: Stiff neck (can't touch chin to chest)    Negative: Possibility of carbon monoxide exposure    Negative: SEVERE headache, states 'worst headache' of life    Negative: SEVERE headache, sudden-onset (i.e., reaching maximum intensity within seconds to 1 hour)    Negative: Severe pain in one eye    Negative: Loss of vision or double vision  (Exception: Same as prior migraines.)    Negative: Patient sounds very sick or weak to the triager    Negative: Fever > 103 F (39.4 C)    Negative: Fever > 100.0 F (37.8 C) and has diabetes mellitus or a weak immune system (e.g., HIV positive, cancer chemotherapy, organ transplant, splenectomy, chronic steroids)    Protocols used: HEADACHE-A-OH

## 2022-10-25 NOTE — PROGRESS NOTES
Chief Complaint   Patient presents with     Headache     Pt has been experiencing a migraine since early Monday morning, pt took 2 doses of imitrex did not work, vomiting on Monday, has not improved      SUBJECTIVE:  Kay Ceja is a 52 year old female who presents to the clinic today with with a migraine for 2 days.  This does feel like her typical migraine left-sided over her eye with some neck tension photophobia nausea and vomiting.  However, her Imitrex did not work like it typically has over the years.  The pain is 7 out of 10 right now but she declines severity warranting the ER.  No confusion nuchal rigidity fever sweats trouble walking talking speaking facial asymmetry weakness.  Known triggers include chemical scents and stress.    Past Medical History:   Diagnosis Date     Anxiety      Basal cell carcinoma      Insomnia      Migraines      Osteopenia     T score -1.9 hip and back 1/19/11     Vasomotor rhinitis     uses nasalcort     Calcium Carbonate-Vit D-Min (CALCIUM 1200 PO), Take 1 tablet by mouth daily Reported on 3/23/2017  ibuprofen (ADVIL/MOTRIN) 200 MG tablet, Take 600 mg by mouth every 8 hours as needed for mild pain or moderate pain  Multiple Vitamins-Minerals (MULTIVITAMIN & MINERAL PO), Take 1 tablet by mouth every day  SUMAtriptan (IMITREX) 50 MG tablet, Take 1 tablet (50 mg) by mouth at onset of headache for migraine for migraine. May repeat in 2 hours. Max 4 tablets/24 hours. For additional refills, please keep 7-25-22 clinic appt.  tretinoin (RETIN-A) 0.025 % external cream, Apply a pea-size amount to entire face nightly at bedtime.  acetaZOLAMIDE (DIAMOX) 125 MG tablet, Take 125mg bid starting day before altitude and then for 2-4 days while at altitude (Patient not taking: Reported on 9/22/2022)  methocarbamol (ROBAXIN) 750 MG tablet, Take 1 tablet (750 mg) by mouth 3 times daily (Patient not taking: Reported on 11/3/2021)    No current facility-administered medications on file prior to  visit.    Social History     Tobacco Use     Smoking status: Former     Packs/day: 0.20     Years: 5.00     Pack years: 1.00     Types: Cigarettes     Quit date: 1999     Years since quittin.1     Smokeless tobacco: Never   Substance Use Topics     Alcohol use: No     No Known Allergies    Review of Systems   All systems negative except for those listed above in HPI.    EXAM:   /76 (BP Location: Right arm, Patient Position: Sitting, Cuff Size: Adult Regular)   Pulse 64   Temp 97.9  F (36.6  C) (Oral)   SpO2 100%     Physical Exam  Vitals reviewed.   Constitutional:       Appearance: Normal appearance.   HENT:      Head: Normocephalic and atraumatic.      Nose: Nose normal.   Eyes:      General: No scleral icterus.     Extraocular Movements: Extraocular movements intact.      Conjunctiva/sclera: Conjunctivae normal.      Pupils: Pupils are equal, round, and reactive to light.   Cardiovascular:      Rate and Rhythm: Normal rate.   Pulmonary:      Effort: Pulmonary effort is normal.   Musculoskeletal:         General: Normal range of motion.   Skin:     General: Skin is warm and dry.      Coloration: Skin is not pale.      Findings: No rash.   Neurological:      General: No focal deficit present.      Mental Status: She is alert and oriented to person, place, and time.      Cranial Nerves: No cranial nerve deficit (normal except photophobia).      Sensory: No sensory deficit.      Motor: No weakness.      Coordination: Coordination normal.      Gait: Gait normal.      Deep Tendon Reflexes: Reflexes normal.   Psychiatric:         Mood and Affect: Mood normal.         Behavior: Behavior normal.       ASSESSMENT:    ICD-10-CM    1. Intractable migraine without aura and with status migrainosus  G43.011 ketorolac (TORADOL) injection 30 mg     ondansetron (ZOFRAN ODT) ODT tab 4 mg      2. Nausea  R11.0 ondansetron (ZOFRAN ODT) 4 MG ODT tab        PLAN:    Toradol and Zofran given in clinic with some  improvement  Recommend pushing fluids and taking a Benadryl and nap when she gets home  Neuro exam was unremarkable except for photophobia  If any severe worsening headache any confusion fever nuchal rigidity vomiting vomiting worsens needs to go to the ER neuro exam was unremarkable  Explained that urgent care is limited without IV fluids or advanced imaging    Follow up with primary care provider with any problems, questions or concerns or if symptoms worsen or fail to improve. Patient agreed to plan and verbalized understanding.    Ely Kwon, REANY-BC  [facility] [place]

## 2022-10-25 NOTE — TELEPHONE ENCOUNTER
Kay called to say that she developed a migraine yesterday.  Normally treats with imitrex, and this is typically effective.    She took first dose of imitrex yesterday without improvement.  Migraine got a little better overnight, but did not resolve.    Repeated dose of imitrex this AM and still no relief.  Experiencing severe headache and vomiting    Encouraged eval and possible treatment in ED     She would also like message to Dr Miles, to see if she has any alternative medication recommendations

## 2022-11-08 DIAGNOSIS — G43.009 MIGRAINE WITHOUT AURA AND WITHOUT STATUS MIGRAINOSUS, NOT INTRACTABLE: ICD-10-CM

## 2022-11-11 RX ORDER — SUMATRIPTAN 50 MG/1
50 TABLET, FILM COATED ORAL
Qty: 18 TABLET | Refills: 4 | Status: SHIPPED | OUTPATIENT
Start: 2022-11-11 | End: 2023-05-09

## 2022-11-11 NOTE — TELEPHONE ENCOUNTER
SUMATRIPTAN SUCC 50 MG TABLET  Last Written Prescription Date:   8/29/2022  Last Fill Quantity: 18,   # refills: 1  Last Office Visit :  7/25/2022  Future Office visit: None  18 Tabs, 4 Refills sent to maya Heath RN  Central Triage Red Flags/Med Refills

## 2022-11-21 ENCOUNTER — OFFICE VISIT (OUTPATIENT)
Dept: DERMATOLOGY | Facility: CLINIC | Age: 52
End: 2022-11-21
Attending: DERMATOLOGY
Payer: COMMERCIAL

## 2022-11-21 VITALS — DIASTOLIC BLOOD PRESSURE: 70 MMHG | SYSTOLIC BLOOD PRESSURE: 105 MMHG | HEART RATE: 71 BPM

## 2022-11-21 DIAGNOSIS — D03.61 MELANOMA IN SITU OF RIGHT UPPER EXTREMITY INCLUDING SHOULDER (H): Primary | ICD-10-CM

## 2022-11-21 PROCEDURE — 88341 IMHCHEM/IMCYTCHM EA ADD ANTB: CPT | Mod: TC | Performed by: DERMATOLOGY

## 2022-11-21 PROCEDURE — 88305 TISSUE EXAM BY PATHOLOGIST: CPT | Mod: 26 | Performed by: DERMATOLOGY

## 2022-11-21 PROCEDURE — 12032 INTMD RPR S/A/T/EXT 2.6-7.5: CPT | Mod: GC | Performed by: DERMATOLOGY

## 2022-11-21 PROCEDURE — 88341 IMHCHEM/IMCYTCHM EA ADD ANTB: CPT | Mod: 26 | Performed by: DERMATOLOGY

## 2022-11-21 PROCEDURE — 17313 MOHS 1 STAGE T/A/L: CPT | Mod: GC | Performed by: DERMATOLOGY

## 2022-11-21 PROCEDURE — 88342 IMHCHEM/IMCYTCHM 1ST ANTB: CPT | Mod: 26 | Performed by: DERMATOLOGY

## 2022-11-21 ASSESSMENT — PAIN SCALES - GENERAL: PAINLEVEL: NO PAIN (0)

## 2022-11-21 NOTE — NURSING NOTE
Chief Complaint   Patient presents with     Derm Problem     Patient is here today for Mohs regarding melanoma in situ of right shoulder.     Megan FLOREZ RN

## 2022-11-21 NOTE — PROGRESS NOTES
[facility] Mohs Surgery Procedure Note    Case #: 1  Date of Service:  Nov 21, 2022  Surgery: Mohs micrographic surgery (MMS)  Staff surgeon: Lio Rojas MD  Fellow surgeon: Oz Loredo MD  Resident surgeon: Caridad Sierra MD  Nurse: Gina Maloney CMA    Tumor Type: Melanoma in situ (MIS)  Location: right shoulder  Derm-Path Accession #: [id number]    Mohs Accession #:   Pre-Op Size: 1.0 cm x 2.1 cm  Final Defect Size: 2.8 cm x 3.4 cm  Number of Mohs stages: 1  Level of Defect: Fat  Local anesthetic: 15 mL 1% lidocaine with epinephrine  Repair Type: Intermediate repair  Repair Size: 4.3 cm  Suture Material: 4-0 Monocryl    Procedure:    Stage I  We discussed the principles of treatment and most likely complications including scarring, bleeding, infection, swelling, pain, crusting, nerve damage, large wound,  incomplete excision, wound dehiscence,  nerve damage, recurrence, and a second procedure may be recommended to obtain the best cosmetic or functional result.    Informed consent was obtained and the patient underwent the procedure as follows:  The patient was placed supine on the operating table.  The cancer was identified, outlined with a marker, and verified by the patient.  The entire surgical field was prepped with chlorhexidine.  The surgical site was anesthetized using lidocaine with epinephrine.    The area of clinically apparent tumor was excised and sent for permanent sections to rule out invasive melanoma. The peripheral rim of tissue was then surgically excised using a #15 blade and was then transferred onto a specimen sheet maintaining the orientation of the specimen. Hemostasis was obtained using bipolar electrocoagulation. The wound site was then covered with a dressing while the tissue samples were processed for examination.    The excised tissue was transported to the Mohs histology laboratory maintaining the tissue orientation.  The tissue specimen was relaxed so that  the entire surgical margin was in a a single horizontal plane for sectioning and inked for precise mapping.  A precise reference map was drawn to reflect the sectioning of the specimen, colored inking of the margins, and orientation on the patient. The tissue was processed using horizontal sectioning of the base and continuous peripheral margins. Vertical, bread loafed sections were obtained from the central portion of the lesion, prior to being sent for permament sections. A control biopsy at the right trapezius was taken to compare the density and periodicity of melanocytes along the dermal-epidermal junction.     The tissue sections were stained with Hematoxylin and Eosin (H&E), as well as Melanoma antigen recognized by T cells or Melan-A (MART-1) stain. The histopathologic sections were reviewed in conjunction with the reference map.     Total blocks: 3   Total slides:  13    Within the central portion of the lesion, there was increased density and periodicity of atypical-appearing melanocytes with areas of confluence at the epidermis, consistent with diagnosis above.    Was the skin lesion clear at this stage?: Yes, the skin lesion was determined clear at periphery at this stage: There was a relatively normal periodicity and density of melanocytes along the dermal-epidermal junction noted at the peripheral margins, therefore Mohs surgery was complete.    REPAIR: An intermediate layered linear closure was selected as the procedure which would maximally preserve both function and cosmesis.    After the excision of the tumor, the area was carefully undermined. Hemostasis was obtained with electrocoagulation.  Closure was oriented so that the wound was parallel to the patient's relaxed skin tension lines.   The subcutaneous and dermal layers were then closed with 4-0 Monocryl sutures. The epidermis was then carefully approximated along the length of the wound using 4-0 Monocryl subcuticular sutures.     Estimated  blood loss was less than 10 ml for all surgical sites. A sterile pressure dressing was applied and wound care instructions, with a written handout, were given. The patient was [facility] from the [facility] alert and ambulatory.    Follow-up for suture removal: Not applicable as only dissolving sutures used    Lio Rojas MD was immediately available for the entire surgery and was physicially present for the key portions of the procedure.    Dr. Oz Loredo (Mohs micrographic surgery fellow) performed the Mohs micrographic surgery and reconstruction under the direct supervision of Lio Rojas MD, who was present for the entire micrographic surgery and key portions of the reconstruction, and always immediately available.    Oz Loredo MD  Dermatology, PGY-5  Mohs surgery fellow    Scribe Disclosure:  I, Alfredito Reyes, am serving as a scribe to document services personally performed by Lio Rojas MD based on data collection and the provider's statements to me.       Attending attestation:  I was present for key elements of the procedure and immediately available for all other portions of the procedure.  I have reviewed the note and edited it as necessary.    Lio Rojas M.D.  Professor  Director of Dermatologic Surgery  Department of Dermatology  [facility]    Dermatology Surgery Clinic  [facility]  [address] [place] [address]

## 2022-11-21 NOTE — PATIENT INSTRUCTIONS

## 2022-11-21 NOTE — LETTER
11/21/2022       RE: Kay Ceja  [address]  [facility] [address]     Dear Colleague,    Thank you for referring your patient, Kay Ceja, to the [facility]MATOLOG[facility]IC [place] at [facility]. Please see a copy of my visit note below.    [facility] Mohs Surgery Procedure Note    Case #: 1  Date of Service:  Nov 21, 2022  Surgery: Mohs micrographic surgery (MMS)  Staff surgeon: Lio Rojas MD  Fellow surgeon: Oz Loredo MD  Resident surgeon: Caridad Sierra MD  Nurse: Gina Maloney CMA    Tumor Type: Melanoma in situ (MIS)  Location: right shoulder  Derm-Path Accession #: [id number]    Mohs Accession #:   Pre-Op Size: 1.0 cm x 2.1 cm  Final Defect Size: 2.8 cm x 3.4 cm  Number of Mohs stages: 1  Level of Defect: Fat  Local anesthetic: 15 mL 1% lidocaine with epinephrine  Repair Type: Intermediate repair  Repair Size: 4.3 cm  Suture Material: 4-0 Monocryl    Procedure:    Stage I  We discussed the principles of treatment and most likely complications including scarring, bleeding, infection, swelling, pain, crusting, nerve damage, large wound,  incomplete excision, wound dehiscence,  nerve damage, recurrence, and a second procedure may be recommended to obtain the best cosmetic or functional result.    Informed consent was obtained and the patient underwent the procedure as follows:  The patient was placed supine on the operating table.  The cancer was identified, outlined with a marker, and verified by the patient.  The entire surgical field was prepped with chlorhexidine.  The surgical site was anesthetized using lidocaine with epinephrine.    The area of clinically apparent tumor was excised and sent for permanent sections to rule out invasive melanoma. The peripheral rim of tissue was then surgically excised using a #15 blade and was then transferred onto a specimen sheet maintaining the orientation of the  specimen. Hemostasis was obtained using bipolar electrocoagulation. The wound site was then covered with a dressing while the tissue samples were processed for examination.    The excised tissue was transported to the Mohs histology laboratory maintaining the tissue orientation.  The tissue specimen was relaxed so that the entire surgical margin was in a a single horizontal plane for sectioning and inked for precise mapping.  A precise reference map was drawn to reflect the sectioning of the specimen, colored inking of the margins, and orientation on the patient. The tissue was processed using horizontal sectioning of the base and continuous peripheral margins. Vertical, bread loafed sections were obtained from the central portion of the lesion, prior to being sent for permament sections. A control biopsy at the right trapezius was taken to compare the density and periodicity of melanocytes along the dermal-epidermal junction.     The tissue sections were stained with Hematoxylin and Eosin (H&E), as well as Melanoma antigen recognized by T cells or Melan-A (MART-1) stain. The histopathologic sections were reviewed in conjunction with the reference map.     Total blocks: 3   Total slides:  13    Within the central portion of the lesion, there was increased density and periodicity of atypical-appearing melanocytes with areas of confluence at the epidermis, consistent with diagnosis above.    Was the skin lesion clear at this stage?: Yes, the skin lesion was determined clear at periphery at this stage: There was a relatively normal periodicity and density of melanocytes along the dermal-epidermal junction noted at the peripheral margins, therefore Mohs surgery was complete.    REPAIR: An intermediate layered linear closure was selected as the procedure which would maximally preserve both function and cosmesis.    After the excision of the tumor, the area was carefully undermined. Hemostasis was obtained with  electrocoagulation.  Closure was oriented so that the wound was parallel to the patient's relaxed skin tension lines.   The subcutaneous and dermal layers were then closed with 4-0 Monocryl sutures. The epidermis was then carefully approximated along the length of the wound using 4-0 Monocryl subcuticular sutures.     Estimated blood loss was less than 10 ml for all surgical sites. A sterile pressure dressing was applied and wound care instructions, with a written handout, were given. The patient was discharged from the [facility] alert and ambulatory.    Follow-up for suture removal: Not applicable as only dissolving sutures used    Lio Rojas MD was immediately available for the entire surgery and was physicially present for the key portions of the procedure.    Dr. Oz Loredo (Mohs micrographic surgery fellow) performed the Mohs micrographic surgery and reconstruction under the direct supervision of Lio Rojas MD, who was present for the entire micrographic surgery and key portions of the reconstruction, and always immediately available.    Oz Loredo MD  Dermatology, PGY-5  Mohs surgery fellow    Scribe Disclosure:  I, Alfredito Reyes, am serving as a scribe to document services personally performed by Lio Rojas MD based on data collection and the provider's statements to me.       Attending attestation:  I was present for key elements of the procedure and immediately available for all other portions of the procedure.  I have reviewed the note and edited it as necessary.    Lio Rojas M.D.  Professor  Director of Dermatologic Surgery  Department of Dermatology  [facility]    Dermatology Surgery Clinic  [facility] [place] [id number]

## 2022-11-25 LAB
PATH REPORT.COMMENTS IMP SPEC: NORMAL
PATH REPORT.FINAL DX SPEC: NORMAL
PATH REPORT.GROSS SPEC: NORMAL
PATH REPORT.MICROSCOPIC SPEC OTHER STN: NORMAL
PATH REPORT.RELEVANT HX SPEC: NORMAL

## 2022-12-03 NOTE — PROGRESS NOTES
Assessment & Plan     Intractable chronic migraine without aura and without status migrainosus  This is a 52-year-old female with previous history of migraines in her 20s and now recurrence of migraines in the last 2 years.  She has had in the last month a persistent daily low-grade migraine.  She was seen in urgent care end of Oct and given ketorolac which helped some but  still has a low-grade headache.  It is not complex. Neuro exam is normal.  She  continues to use Imitrex but  feels that the HA  has not gone away.  Question is whether she should be on prophylaxis.  We will give her a migraine cocktail of 10 mg Reglan, 800 mg ibuprofen, 1000 mg Tylenol, Imitrex, Benadryl 50 mg, hydration and rest.  Encouraged to use this 2 times a week for just 1 week to see if we can break the headache.  Also gave her a referral to the headache clinic to see if she might benefit from prophylaxis.  - Adult Neurology  Referral  - metoclopramide (REGLAN) 10 MG tablet  Dispense: 4 tablet; Refill: 0    0Return in about 4 weeks (around 1/2/2023).    Carmella Miles MD PhD  [facility]    Time note (e5, 40'): The total time (on the date of service) for this service was 63 minutes, including discussion/face-to-face, chart review, interpretation not otherwise reported, documentation, and updating of the computerized record.      Estefany Villanueva is a 52 year old, presenting for the following health issues:  Headache    HPI seen 7/2022 for a wellness exam  Hx of  Headaches including migraines in her 20's.  2 yrs ago had an oral surgery and then with menopause migraines came back.  Has been using oral imitrex and has worked.  Will feel like a stress HA and then locates to the left side - then gets nausea and light sensitivity and poor balance - no aura -  No leg or ar numbness.  End of Oct got worse and  seen in Urgent Care - had an injection. - Toradol    - helped some but still has  "concern on a daily basis and has low grade HA. No daily prophylaxis.  No HRT.  LMP was 9/2021.  Gets some hot flashes.  Main trigger is stress.  Most of Nov was at a retreat and now back at work -  She is a psycho therapist.  She has been taking imitrex for low grade HA  - tends to feel not back to normal - still has some unsteadiness  And low grade HA       Review of Systems endorses ringing in ears and the rest of the complete ROS including Constitutional, HEENT, cardiovascular, pulmonary, GI, , musculoskeletal, neuro, skin, endocrine and psych systems are negative, except as otherwise noted.      Objective    BP 95/63 (BP Location: Left arm, Patient Position: Sitting, Cuff Size: Adult Regular)   Pulse 70   Ht 1.626 m (5' 4\")   Wt 48.5 kg (107 lb)   BMI 18.37 kg/m    Body mass index is 18.37 kg/m .  Physical Exam   GENERAL: healthy, alert and no distress  NECK: no adenopathy, no asymmetry, masses, or scars and thyroid normal to palpation  RESP: lungs clear to auscultation - no rales, rhonchi or wheezes  CV: regular rate and rhythm, normal S1 S2, no S3 or S4, no murmur, click or rub, no peripheral edema and peripheral pulses strong  MS: no gross musculoskeletal defects noted, no edema  SKIN: no suspicious lesions or rashes  NEURO: Normal strength and tone, sensory exam grossly normal, mentation intact, speech normal, cranial nerves 2-12 intact, DTR's normal and symmetric 2/4, gait normal including heel/toe/tandem walking and F-N and H-S intact    Carmella Miles MD, PhD               "

## 2022-12-05 ENCOUNTER — OFFICE VISIT (OUTPATIENT)
Dept: FAMILY MEDICINE | Facility: CLINIC | Age: 52
End: 2022-12-05
Attending: FAMILY MEDICINE
Payer: COMMERCIAL

## 2022-12-05 VITALS
BODY MASS INDEX: 18.27 KG/M2 | DIASTOLIC BLOOD PRESSURE: 63 MMHG | WEIGHT: 107 LBS | SYSTOLIC BLOOD PRESSURE: 95 MMHG | HEART RATE: 70 BPM | HEIGHT: 64 IN

## 2022-12-05 DIAGNOSIS — G43.719 INTRACTABLE CHRONIC MIGRAINE WITHOUT AURA AND WITHOUT STATUS MIGRAINOSUS: Primary | ICD-10-CM

## 2022-12-05 PROCEDURE — G0463 HOSPITAL OUTPT CLINIC VISIT: HCPCS

## 2022-12-05 PROCEDURE — 99215 OFFICE O/P EST HI 40 MIN: CPT | Performed by: FAMILY MEDICINE

## 2022-12-05 RX ORDER — METOCLOPRAMIDE 10 MG/1
TABLET ORAL
Qty: 4 TABLET | Refills: 0 | Status: SHIPPED | OUTPATIENT
Start: 2022-12-05 | End: 2023-05-09

## 2022-12-05 ASSESSMENT — ANXIETY QUESTIONNAIRES
8. IF YOU CHECKED OFF ANY PROBLEMS, HOW DIFFICULT HAVE THESE MADE IT FOR YOU TO DO YOUR WORK, TAKE CARE OF THINGS AT HOME, OR GET ALONG WITH OTHER PEOPLE?: SOMEWHAT DIFFICULT
1. FEELING NERVOUS, ANXIOUS, OR ON EDGE: SEVERAL DAYS
4. TROUBLE RELAXING: SEVERAL DAYS
GAD7 TOTAL SCORE: 5
GAD7 TOTAL SCORE: 5
IF YOU CHECKED OFF ANY PROBLEMS ON THIS QUESTIONNAIRE, HOW DIFFICULT HAVE THESE PROBLEMS MADE IT FOR YOU TO DO YOUR WORK, TAKE CARE OF THINGS AT HOME, OR GET ALONG WITH OTHER PEOPLE: SOMEWHAT DIFFICULT
7. FEELING AFRAID AS IF SOMETHING AWFUL MIGHT HAPPEN: NOT AT ALL
6. BECOMING EASILY ANNOYED OR IRRITABLE: NOT AT ALL
2. NOT BEING ABLE TO STOP OR CONTROL WORRYING: SEVERAL DAYS
3. WORRYING TOO MUCH ABOUT DIFFERENT THINGS: SEVERAL DAYS
5. BEING SO RESTLESS THAT IT IS HARD TO SIT STILL: SEVERAL DAYS
7. FEELING AFRAID AS IF SOMETHING AWFUL MIGHT HAPPEN: NOT AT ALL

## 2022-12-05 NOTE — NURSING NOTE
Chief Complaint   Patient presents with     Consult     Migraines, dizziness, sensitive to smell and light , also have nausea

## 2022-12-05 NOTE — PATIENT INSTRUCTIONS
Neurology appt for headache clinic  Try headache cocktail  Take 10 mg of reglan +800mg ibuprofen + 1000mg tylenol + imitrex + 50 mg benadryl and lots of hydration and rest - try this and do it 2x/wk for 1 wk - see if it will break the headache.      Reglan is better than zofran for migraines

## 2023-01-15 ENCOUNTER — HEALTH MAINTENANCE LETTER (OUTPATIENT)
Age: 53
End: 2023-01-15

## 2023-02-16 NOTE — TELEPHONE ENCOUNTER
FUTURE VISIT INFORMATION      FUTURE VISIT INFORMATION:    Date: 5/9/2023    Time: 830am    [place] [facility] [place] INFORMATION:    Referring provider:  Dr. Miles     Referring providers clinic:  [facility]    Reason for visit/diagnosis  Migraines     RECORDS REQUESTED FROM:       Clinic name Comments Records Status Imaging Status   Internal Dr. Miles-12/5/2022    CT Head-10/13/2021 Epic PACS

## 2023-04-04 ENCOUNTER — OFFICE VISIT (OUTPATIENT)
Dept: DERMATOLOGY | Facility: CLINIC | Age: 53
End: 2023-04-04
Payer: COMMERCIAL

## 2023-04-04 DIAGNOSIS — B07.9 VERRUCA VULGARIS: ICD-10-CM

## 2023-04-04 DIAGNOSIS — Z86.006 HX OF MELANOMA IN SITU: ICD-10-CM

## 2023-04-04 DIAGNOSIS — D48.9 NEOPLASM OF UNCERTAIN BEHAVIOR: Primary | ICD-10-CM

## 2023-04-04 DIAGNOSIS — D22.9 MULTIPLE BENIGN NEVI: ICD-10-CM

## 2023-04-04 DIAGNOSIS — Z85.828 HISTORY OF NONMELANOMA SKIN CANCER: ICD-10-CM

## 2023-04-04 DIAGNOSIS — Z87.898 HISTORY OF ATYPICAL NEVUS: ICD-10-CM

## 2023-04-04 DIAGNOSIS — L82.1 SEBORRHEIC KERATOSIS: ICD-10-CM

## 2023-04-04 DIAGNOSIS — L70.0 ACNE VULGARIS: ICD-10-CM

## 2023-04-04 DIAGNOSIS — L81.4 SOLAR LENTIGO: ICD-10-CM

## 2023-04-04 DIAGNOSIS — L57.0 ACTINIC KERATOSIS: ICD-10-CM

## 2023-04-04 PROCEDURE — 99213 OFFICE O/P EST LOW 20 MIN: CPT | Mod: 25 | Performed by: DERMATOLOGY

## 2023-04-04 PROCEDURE — 17000 DESTRUCT PREMALG LESION: CPT | Mod: XS | Performed by: DERMATOLOGY

## 2023-04-04 PROCEDURE — 88341 IMHCHEM/IMCYTCHM EA ADD ANTB: CPT | Mod: TC | Performed by: DERMATOLOGY

## 2023-04-04 PROCEDURE — 88341 IMHCHEM/IMCYTCHM EA ADD ANTB: CPT | Mod: 26 | Performed by: DERMATOLOGY

## 2023-04-04 PROCEDURE — 11103 TANGNTL BX SKIN EA SEP/ADDL: CPT | Mod: XS | Performed by: DERMATOLOGY

## 2023-04-04 PROCEDURE — 88342 IMHCHEM/IMCYTCHM 1ST ANTB: CPT | Mod: 26 | Performed by: DERMATOLOGY

## 2023-04-04 PROCEDURE — 88305 TISSUE EXAM BY PATHOLOGIST: CPT | Mod: 26 | Performed by: DERMATOLOGY

## 2023-04-04 PROCEDURE — 11102 TANGNTL BX SKIN SINGLE LES: CPT | Mod: XS | Performed by: DERMATOLOGY

## 2023-04-04 PROCEDURE — 17003 DESTRUCT PREMALG LES 2-14: CPT | Mod: XS | Performed by: DERMATOLOGY

## 2023-04-04 PROCEDURE — 17110 DESTRUCTION B9 LES UP TO 14: CPT | Mod: GC | Performed by: DERMATOLOGY

## 2023-04-04 ASSESSMENT — PAIN SCALES - GENERAL: PAINLEVEL: NO PAIN (0)

## 2023-04-04 NOTE — PROGRESS NOTES
[facility] Dermatology Note  Encounter Date: Apr 4, 2023  Office Visit     Dermatology Problem List:  # Neoplasm of uncertain behavior  - Scalp, s/p shave bx 4/4/23, r/o SK vs benign nevus  - Right back, s/p shave bx 4/4/23, r/o atypical nevus vs melanoma  1. History of NMSC:              - BCC, R superior forehead s/p MMS 10/2014              - BCC, R [employer], s/p MMS in [place] in 2012   - MIS, R shoulder, s/p MMS 11/21/2022  2. Multiple (>100) atypical nevi on trunk and extremities. Prior bxs with moderate dysplasia.  3. AKs: LN2, field therapy states no reaction (per patient), but also documented as improved after field therapy with 5-FU 1-2x/week. S/P cryotherapy 3/1/22, 4/4/23  4. Flat warts, dorsal PIPs. Cryo 3/14/16, 3/1/22, 9/22/22  5. Acne: tretinoin 0.025%  6. Sebaceous hyperplasia. tretinoin 0.025% cream.  7. 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 Resolved with TMC 0.1% cream  8. Compound dysplastic nevus, right leg.   ____________________________________________    Assessment & Plan:    # Neoplasm of uncertain behavior, Right Back  6mm pigmented macule with branching pigment network. Will biopsy to rule out melanoma vs atypical nevus  - Shave biopsy (see procedure note below)    # Neoplasm of uncertain behavior, Scalp  Patient notes scaly scalp lesion that often bleeds and is irritating. Suspect SK vs inflamed benign nevus, recommend shave biopsy as below  - Shave biopsy (see procedure note below)    # Actinic Keratosis  Etiology and treatment options reviewed. Will treat AK on right temple and left upper cutaneous lip with cryotherapy.   - Cryotherapy (see procedure note below)    # Verruca Vulgaris, R 2nd DIP  Counseled on the nature of this skin condition and treatment options.  - Previous cryotherapy 3/14/16, 3/1/22, 4/4/23  - Cryotherapy performed today to right 2nd finger DIP, see procedure note below.     # Hx NMSC. NERD.   # Hx atypical nevi.  NERD.   # Hx MIS. NERD.   - ABCDs of melanoma were discussed and self skin checks were advised.  - Sun precaution was advised including the use of sun screens of SPF 30 or higher, sun protective clothing, and avoidance of tanning beds.     # Benign lesions: Multiple benign nevi, solar lentigos, seborrheic keratoses. Explained to patient benign nature of lesion. No treatment is necessary at this time unless the lesion changes or becomes symptomatic.   - ABCDs of melanoma were discussed and self skin checks were advised.  - Sun precaution was advised including the use of sun screens of SPF 30 or higher, sun protective clothing, and avoidance of tanning beds.    Procedures Performed:   - Cryotherapy procedure note, location(s): Right 2nd DIP. After verbal consent and discussion of risks and benefits including, but not limited to, dyspigmentation/scar, blister, and pain, One lesion(s) was(were) treated with 1-2 mm freeze border for 1-2 cycles with liquid nitrogen. Post cryotherapy instructions were provided.  - Cryotherapy procedure note, location(s): Right temple, Left upper cutaneous lip actinic keratosis. After verbal consent and discussion of risks and benefits including, but not limited to, dyspigmentation/scar, blister, and pain, One lesion(s) was(were) treated with 1-2 mm freeze border for 1-2 cycles with liquid nitrogen. Post cryotherapy instructions were provided.  - Shave biopsy: Location(s) Scalp, Right Back.  After discussion of benefits and risks including but not limited to bleeding/bruising, pain/swelling, infection, scar, incomplete removal, nerve damage/numbness, recurrence, and non-diagnostic biopsy, written consent, verbal consent and photographs were obtained. Time-out was performed. The area was cleaned with isopropyl alcohol. 0.5mL of 1% lidocaine with epinephrine was injected to obtain adequate anesthesia of each lesion. Shave biopsy was performed. Hemostasis was achieved with aluminium chloride.  Vaseline and a sterile dressing were applied. The patient tolerated the procedure and no complications were noted. The patient was provided with verbal and written post care instructions.     Follow-up: 6 month(s) in-person, or earlier for new or changing lesions    Staff and Scribe:     Scribe Disclosure:  I, Jd Jewell, am serving as a scribe to document services personally performed by Joel Bradshaw MD based on data collection and the provider's statements to me.     Michel White MD  Internal Medicine - Dermatology (PGY-2)     Provider Disclosure:   The documentation recorded by the scribe accurately reflects the services I personally performed and the decisions made by me.    Staff Physician Comments:   I saw and evaluated the patient with the resident and I agree with the assessment and plan.  I was present for the entire minor procedure, key portions of the above major procedure and examination.    Joel Bradshaw MD  Pronouns: he/him/his    Department of Dermatology  [facility]: Phone: [phone], Fax:[phone]  [facility]: Phone: [phone] Fax: [phone]  ____________________________________________    CC: Skin Check (Areas of concern include face and R shoulder)    HPI:  Ms. Kay Ceja is a(n) 52 year old female who presents today as a return patient for skin check.  Patient has history of nonmelanoma skin cancer which was excised.  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  Patient notes that the surgical site has been overall healing well.  Her primary concerns are certain lesions on her face that she moderate, she is unsure whether these are skin cancers are benign.  Patient also has a spot on her parietal scalp that often becomes irritated and scabbed over.  If she pulls on the scabs, it  starts to bleed.  Patient also notes a wart on her right second finger DIP joint.  Previously treated with cryotherapy, but remains persistent.  Patient is open to additional rounds of cryotherapy today.  Patient denies any fevers, chills, unexpected weight loss.  She does not note any other new, painful, growing, or bleeding lesions of concern.    Labs Reviewed:  N/A    Physical Exam:  Vitals: There were no vitals taken for this visit.  SKIN: Full skin, which includes the head/face, both arms, chest, back, abdomen,both legs, genitalia and/or groin buttocks, digits and/or nails, was examined.  - On back, brown papule with stuck on appearance. Erythematous base and signs of excoriation.  -On left medial eyebrow and forehead,  Flesh colored papules with rough texture and stuck on appearance.   - There are waxy stuck on tan to brown papules on the back and chest.   - Multiple regular brown pigmented macules and papules are identified on the trunk and extremities.   - Scattered brown macules on sun exposed areas  -On the right back there is a 6 mm pigmented macule with darker pigmentation compared to the surrounding nevi, in addition to radially projecting pigment network.  - On the parietal scalp there is a ~4 mm skin colored papule with secondary scale.  No erosion or ulceration noted.  - On the right second finger DIP joint, there is a ~5 mm skin colored verrucous papule, nontender to palpation  - On the right temple and left upper cutaneous lip, there are 2 gritty pink papules.  - No other lesions of concern on areas examined.     Medications:  Current Outpatient Medications   Medication     Calcium Carbonate-Vit D-Min (CALCIUM 1200 PO)     ibuprofen (ADVIL/MOTRIN) 200 MG tablet     metoclopramide (REGLAN) 10 MG tablet     Multiple Vitamins-Minerals (MULTIVITAMIN & MINERAL PO)     ondansetron (ZOFRAN ODT) 4 MG ODT tab     SUMAtriptan (IMITREX) 50 MG tablet     tretinoin (RETIN-A) 0.025 % external cream      acetaZOLAMIDE (DIAMOX) 125 MG tablet     No current facility-administered medications for this visit.      Past Medical History:   Patient Active Problem List   Diagnosis     Anxiety     Amenorrhea     Osteopenia     Senile sebaceous gland hyperplasia     Atypical nevi     Basal cell carcinoma of right forehead s/p mms 10-13-14     Pain in joint, pelvic region and thigh     Pain in right ankle     Peroneal tendinitis     Traumatic pneumothorax, initial encounter     Edema     Stress fracture of ankle     Past Medical History:   Diagnosis Date     Anxiety      Basal cell carcinoma      Insomnia      Migraines      Osteopenia     T score -1.9 hip and back 1/19/11     Vasomotor rhinitis     uses nasalcort

## 2023-04-04 NOTE — LETTER
4/4/2023       RE: Kay Ceja  [address]  [facility] [address]     Dear Colleague,    Thank you for referring your patient, Kay Ceja, to the [facility] [place] [facility]. Please see a copy of my visit note below.    [facility] Dermatology Note  Encounter Date: Apr 4, 2023  Office Visit     Dermatology Problem List:  # Neoplasm of uncertain behavior  - Scalp, s/p shave bx 4/4/23, r/o SK vs benign nevus  - Right back, s/p shave bx 4/4/23, r/o atypical nevus vs melanoma  1. History of NMSC:              - BCC, R superior forehead s/p MMS 10/2014              - BCC, R [employer], s/p MMS in [place] in 2012   - MIS, R shoulder, s/p MMS 11/21/2022  2. Multiple (>100) atypical nevi on trunk and extremities. Prior bxs with moderate dysplasia.  3. AKs: LN2, field therapy states no reaction (per patient), but also documented as improved after field therapy with 5-FU 1-2x/week. S/P cryotherapy 3/1/22, 4/4/23  4. Flat warts, dorsal PIPs. Cryo 3/14/16, 3/1/22, 9/22/22  5. Acne: tretinoin 0.025%  6. Sebaceous hyperplasia. tretinoin 0.025% cream.  7. 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 Resolved with TMC 0.1% cream  8. Compound dysplastic nevus, right leg.   ____________________________________________    Assessment & Plan:    # Neoplasm of uncertain behavior, Right Back  6mm pigmented macule with branching pigment network. Will biopsy to rule out melanoma vs atypical nevus  - Shave biopsy (see procedure note below)    # Neoplasm of uncertain behavior, Scalp  Patient notes scaly scalp lesion that often bleeds and is irritating. Suspect SK vs inflamed benign nevus, recommend shave biopsy as below  - Shave biopsy (see procedure note below)    # Actinic Keratosis  Etiology and treatment options reviewed. Will treat AK on right temple and left upper cutaneous lip with cryotherapy.   -  Cryotherapy (see procedure note below)    # Verruca Vulgaris, R 2nd DIP  Counseled on the nature of this skin condition and treatment options.  - Previous cryotherapy 3/14/16, 3/1/22, 4/4/23  - Cryotherapy performed today to right 2nd finger DIP, see procedure note below.     # Hx NMSC. NERD.   # Hx atypical nevi. NERD.   # Hx MIS. NERD.   - ABCDs of melanoma were discussed and self skin checks were advised.  - Sun precaution was advised including the use of sun screens of SPF 30 or higher, sun protective clothing, and avoidance of tanning beds.     # Benign lesions: Multiple benign nevi, solar lentigos, seborrheic keratoses. Explained to patient benign nature of lesion. No treatment is necessary at this time unless the lesion changes or becomes symptomatic.   - ABCDs of melanoma were discussed and self skin checks were advised.  - Sun precaution was advised including the use of sun screens of SPF 30 or higher, sun protective clothing, and avoidance of tanning beds.    Procedures Performed:   - Cryotherapy procedure note, location(s): Right 2nd DIP. After verbal consent and discussion of risks and benefits including, but not limited to, dyspigmentation/scar, blister, and pain, One lesion(s) was(were) treated with 1-2 mm freeze border for 1-2 cycles with liquid nitrogen. Post cryotherapy instructions were provided.  - Cryotherapy procedure note, location(s): Right temple, Left upper cutaneous lip actinic keratosis. After verbal consent and discussion of risks and benefits including, but not limited to, dyspigmentation/scar, blister, and pain, One lesion(s) was(were) treated with 1-2 mm freeze border for 1-2 cycles with liquid nitrogen. Post cryotherapy instructions were provided.  - Shave biopsy: Location(s) Scalp, Right Back.  After discussion of benefits and risks including but not limited to bleeding/bruising, pain/swelling, infection, scar, incomplete removal, nerve damage/numbness, recurrence, and non-diagnostic  biopsy, written consent, verbal consent and photographs were obtained. Time-out was performed. The area was cleaned with isopropyl alcohol. 0.5mL of 1% lidocaine with epinephrine was injected to obtain adequate anesthesia of each lesion. Shave biopsy was performed. Hemostasis was achieved with aluminium chloride. Vaseline and a sterile dressing were applied. The patient tolerated the procedure and no complications were noted. The patient was provided with verbal and written post care instructions.     Follow-up: 6 month(s) in-person, or earlier for new or changing lesions    Staff and Scribe:     Scribe Disclosure:  I, Jd Jewell, am serving as a scribe to document services personally performed by Joel Bradshaw MD based on data collection and the provider's statements to me.     Michel White MD  Internal Medicine - Dermatology (PGY-2)     Provider Disclosure:   The documentation recorded by the scribe accurately reflects the services I personally performed and the decisions made by me.    Staff Physician Comments:   I saw and evaluated the patient with the resident and I agree with the assessment and plan.  I was present for the entire minor procedure, key portions of the above major procedure and examination.    Joel Bradshaw MD  Pronouns: he/him/his    Department of Dermatology  [facility]: Phone: [phone], Fax:[phone]  [facility]: Phone: [phone] Fax: [phone]  ____________________________________________    CC: Skin Check (Areas of concern include face and R shoulder)    HPI:  Ms. Kay Ceja is a(n) 52 year old female who presents today as a return patient for skin check.  Patient has history of nonmelanoma skin cancer which was excised.  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  Patient notes  that the surgical site has been overall healing well.  Her primary concerns are certain lesions on her face that she moderate, she is unsure whether these are skin cancers are benign.  Patient also has a spot on her parietal scalp that often becomes irritated and scabbed over.  If she pulls on the scabs, it starts to bleed.  Patient also notes a wart on her right second finger DIP joint.  Previously treated with cryotherapy, but remains persistent.  Patient is open to additional rounds of cryotherapy today.  Patient denies any fevers, chills, unexpected weight loss.  She does not note any other new, painful, growing, or bleeding lesions of concern.    Labs Reviewed:  N/A    Physical Exam:  Vitals: There were no vitals taken for this visit.  SKIN: Full skin, which includes the head/face, both arms, chest, back, abdomen,both legs, genitalia and/or groin buttocks, digits and/or nails, was examined.  - On back, brown papule with stuck on appearance. Erythematous base and signs of excoriation.  -On left medial eyebrow and forehead,  Flesh colored papules with rough texture and stuck on appearance.   - There are waxy stuck on tan to brown papules on the back and chest.   - Multiple regular brown pigmented macules and papules are identified on the trunk and extremities.   - Scattered brown macules on sun exposed areas  -On the right back there is a 6 mm pigmented macule with darker pigmentation compared to the surrounding nevi, in addition to radially projecting pigment network.  - On the parietal scalp there is a ~4 mm skin colored papule with secondary scale.  No erosion or ulceration noted.  - On the right second finger DIP joint, there is a ~5 mm skin colored verrucous papule, nontender to palpation  - On the right temple and left upper cutaneous lip, there are 2 gritty pink papules.  - No other lesions of concern on areas examined.     Medications:  Current Outpatient Medications   Medication    Calcium Carbonate-Vit  D-Min (CALCIUM 1200 PO)    ibuprofen (ADVIL/MOTRIN) 200 MG tablet    metoclopramide (REGLAN) 10 MG tablet    Multiple Vitamins-Minerals (MULTIVITAMIN & MINERAL PO)    ondansetron (ZOFRAN ODT) 4 MG ODT tab    SUMAtriptan (IMITREX) 50 MG tablet    tretinoin (RETIN-A) 0.025 % external cream    acetaZOLAMIDE (DIAMOX) 125 MG tablet     No current facility-administered medications for this visit.      Past Medical History:   Patient Active Problem List   Diagnosis    Anxiety    Amenorrhea    Osteopenia    Senile sebaceous gland hyperplasia    Atypical nevi    Basal cell carcinoma of right forehead s/p mms 10-13-14    Pain in joint, pelvic region and thigh    Pain in right ankle    Peroneal tendinitis    Traumatic pneumothorax, initial encounter    Edema    Stress fracture of ankle     Past Medical History:   Diagnosis Date    Anxiety     Basal cell carcinoma     Insomnia     Migraines     Osteopenia     T score -1.9 hip and back 1/19/11    Vasomotor rhinitis     uses nasalcort

## 2023-04-04 NOTE — NURSING NOTE
Chief Complaint   Patient presents with     Skin Check     Areas of concern include face and R shoulder     Miguel Samuels, EMT

## 2023-04-04 NOTE — PATIENT INSTRUCTIONS
Plan:  - we will contact you regarding the biopsy results  - Follow up in 6 months for your skin exam    Wound Care After a Biopsy    What is a skin biopsy?  A skin biopsy allows the doctor to examine a very small piece of tissue under the microscope to determine the diagnosis and the best treatment for the skin condition. A local anesthetic (numbing medicine)  is injected with a very small needle into the skin area to be tested. A small piece of skin is taken from the area. Sometimes a suture (stitch) is used.     What are the risks of a skin biopsy?  I will experience scar, bleeding, swelling, pain, crusting and redness. I may experience incomplete removal or recurrence. Risks of this procedure are excessive bleeding, bruising, infection, nerve damage, numbness, thick (hypertrophic or keloidal) scar and non-diagnostic biopsy.    How should I care for my wound for the first 24 hours?  Keep the wound dry and covered for 24 hours  If it bleeds, hold direct pressure on the area for 15 minutes. If bleeding does not stop then go to the emergency room  Avoid strenuous exercise the first 1-2 days or as your doctor instructs you    How should I care for the wound after 24 hours?  After 24 hours, remove the bandage  You may bathe or shower as normal  If you had a scalp biopsy, you can shampoo as usual and can use shower water to clean the biopsy site daily  Clean the wound twice a day with gentle soap and water  Do not scrub, be gentle  Apply white petroleum/Vaseline after cleaning the wound with a cotton swab or a clean finger, and keep the site covered with a Bandaid /bandage. Bandages are not necessary with a scalp biopsy  If you are unable to cover the site with a Bandaid /bandage, re-apply ointment 2-3 times a day to keep the site moist. Moisture will help with healing  Avoid strenuous activity for first 1-2 days  Avoid lakes, rivers, pools, and oceans until the stitches are removed or the site is healed    How do I  clean my wound?  Wash hands thoroughly with soap or use hand  before all wound care  Clean the wound with gentle soap and water  Apply white petroleum/Vaseline  to wound after it is clean  Replace the Bandaid /bandage to keep the wound covered for the first few days or as instructed by your doctor  If you had a scalp biopsy, warm shower water to the area on a daily basis should suffice    What should I use to clean my wound?   Cotton-tipped applicators (Qtips )  White petroleum jelly (Vaseline ). Use a clean new container and use Q-tips to apply.  Bandaids   as needed  Gentle soap     How should I care for my wound long term?  Do not get your wound dirty  Keep up with wound care for one week or until the area is healed.  A small scab will form and fall off by itself when the area is completely healed. The area will be red and will become pink in color as it heals. Sun protection is very important for how your scar will turn out. Sunscreen with an SPF 30 or greater is recommended once the area is healed.  You should have some soreness but it should be mild and slowly go away over several days. Talk to your doctor about using tylenol for pain,    When should I call my doctor?  If you have increased:   Pain or swelling  Pus or drainage (clear or slightly yellow drainage is ok)  Temperature over 100F  Spreading redness or warmth around wound    When will I hear about my results?  The biopsy results can take 2 weeks to come back.  Your results will automatically release to [employer] before your provider has even reviewed them.  The clinic will call you with the results, send you a [employer] message, or have you schedule a follow-up clinic or phone time to discuss the results.  Contact our clinics if you do not hear from us in 2 weeks.    Who should I call with questions?  [facility]: [phone]  [facility]: [phone]  For urgent needs outside of  business hours call the [facility] at [phone] and ask for the dermatology resident on call      Cryotherapy    What is it?  Use of a very cold liquid, such as liquid nitrogen, to freeze and destroy abnormal skin cells that need to be removed    What should I expect?  Tenderness and redness  A small blister that might grow and fill with dark purple blood. There may be crusting.  More than one treatment may be needed if the lesions do not go away.    How do I care for the treated area?  Gently wash the area with your hands when bathing.  Use a thin layer of Vaseline to help with healing. You may use a Band-Aid.   The area should heal within 7-10 days and may leave behind a pink or lighter color.   Do not use an antibiotic or Neosporin ointment.   You may take acetaminophen (Tylenol) for pain.     Call your doctor if you have:  Severe pain  Signs of infection (warmth, redness, cloudy yellow drainage, and or a bad smell)  Questions or concerns    Who should I call with questions?      [facility]: [phone]      [facility]: [phone]      For urgent needs outside of business hours call the [facility] at [phone] and ask for the dermatology resident on call        Patient Education     Checking for Skin Cancer  You can find cancer early by checking your skin each month. There are 3 kinds of skin cancer. They are melanoma, basal cell carcinoma, and squamous cell carcinoma. Doing monthly skin checks is the best way to find new marks or skin changes. Follow the instructions below for checking your skin.   The ABCDEs of checking moles for melanoma   Check your moles or growths for signs of melanoma using ABCDE:   Asymmetry: the sides of the mole or growth don t match  Border: the edges are ragged, notched, or blurred  Color: the color within the mole or growth varies  Diameter: the mole or growth is larger than 6 mm (size of a pencil  eraser)  Evolving: the size, shape, or color of the mole or growth is changing (evolving is not shown in the images below)    Checking for other types of skin cancer  Basal cell carcinoma or squamous cell carcinoma have symptoms such as:     A spot or mole that looks different from all other marks on your skin  Changes in how an area feels, such as itching, tenderness, or pain  Changes in the skin's surface, such as oozing, bleeding, or scaliness  A sore that does not heal  New swelling or redness beyond the border of a mole    Who s at risk?  Anyone can get skin cancer. But you are at greater risk if you have:   Fair skin, light-colored hair, or light-colored eyes  Many moles or abnormal moles on your skin  A history of sunburns from sunlight or tanning beds  A family history of skin cancer  A history of exposure to radiation or chemicals  A weakened immune system  If you have had skin cancer in the past, you are at risk for recurring skin cancer.   How to check your skin  Do your monthly skin checkups in front of a full-length mirror. Check all parts of your body, including your:   Head (ears, face, neck, and scalp)  Torso (front, back, and sides)  Arms (tops, undersides, upper, and lower armpits)  Hands (palms, backs, and fingers, including under the nails)  Buttocks and genitals  Legs (front, back, and sides)  Feet (tops, soles, toes, including under the nails, and between toes)  If you have a lot of moles, take digital photos of them each month. Make sure to take photos both up close and from a distance. These can help you see if any moles change over time.   Most skin changes are not cancer. But if you see any changes in your skin, call your doctor right away. Only he or she can diagnose a problem. If you have skin cancer, seeing your doctor can be the first step toward getting the treatment that could save your life.   [employer] last reviewed this educational content on 4/1/2019 2000-2020 [employer]. [address] [place] [address]. All rights reserved. This information is not intended as a substitute for professional medical care. Always follow your healthcare professional's instructions.       When should I call my doctor?  If you are worsening or not improving, please, contact us or seek urgent care as noted below.     Who should I call with questions (adults)?  [facility] (adult and pediatric): [phone]  [facility] (adult): [phone]  For urgent needs outside of business hours call the [facility] at [phone] and ask for the dermatology resident on call to be paged  If this is a medical emergency and you are unable to reach an ER, Call [phone]    Who should I call with questions (pediatric)?  [facility]- Pediatric Dermatology  Dr. Kristal Nicholas, Dr. Chris Genao, Dr. Marietta Alvarez, DANIEL Joe, Dr. Esha Hutchinson, Dr. Kat Gómez & Dr. Andre Dickinson  Non-urgent nurse triage line; [phone]- Bindu and Alee FERRER Care Coordinators   Maribel (/Complex ) [phone]    If you need a prescription refill, please contact your pharmacy. Refills are approved or denied by our Physicians during normal business hours, Monday through Fridays  Per office policy, refills will not be granted if you have not been seen within the past year (or sooner depending on your child's condition)    Scheduling Information:  Pediatric Appointment Scheduling and Call Center [phone]  Radiology Scheduling- [phone]  Sedation Unit Scheduling- [phone]  [place] Scheduling- General [phone]; Pediatric Dermatology [phone]  Main  Services: [phone]  German: [phone]  Thai: [phone]  Hmong/Leopoldo/Kenney: [phone]  Preadmission Nursing Department Fax Number: [phone] (Fax all pre-operative paperwork to this number)    For  urgent matters arising during evenings, weekends, or holidays that cannot wait for normal business hours please call [phone] and ask for the dermatology resident on call to be paged.

## 2023-04-04 NOTE — NURSING NOTE
Lidocaine-epinephrine 1-1:634555 % injection   1.5mL once for one use, starting 4/4/2023 ending 4/4/2023,  2mL disp, R-0, injection  Injected by Dr. White

## 2023-04-07 ENCOUNTER — TELEPHONE (OUTPATIENT)
Dept: DERMATOLOGY | Facility: CLINIC | Age: 53
End: 2023-04-07
Payer: COMMERCIAL

## 2023-04-07 NOTE — TELEPHONE ENCOUNTER
Erendira.1st attempt informing patient to call [phone] to schedule 6 month follow up with Dr. Bradshaw  in Dermatology.

## 2023-04-13 ASSESSMENT — ANXIETY QUESTIONNAIRES
2. NOT BEING ABLE TO STOP OR CONTROL WORRYING: SEVERAL DAYS
GAD7 TOTAL SCORE: 5
7. FEELING AFRAID AS IF SOMETHING AWFUL MIGHT HAPPEN: SEVERAL DAYS
5. BEING SO RESTLESS THAT IT IS HARD TO SIT STILL: NOT AT ALL
1. FEELING NERVOUS, ANXIOUS, OR ON EDGE: SEVERAL DAYS
4. TROUBLE RELAXING: SEVERAL DAYS
GAD7 TOTAL SCORE: 5
8. IF YOU CHECKED OFF ANY PROBLEMS, HOW DIFFICULT HAVE THESE MADE IT FOR YOU TO DO YOUR WORK, TAKE CARE OF THINGS AT HOME, OR GET ALONG WITH OTHER PEOPLE?: SOMEWHAT DIFFICULT
3. WORRYING TOO MUCH ABOUT DIFFERENT THINGS: NOT AT ALL
6. BECOMING EASILY ANNOYED OR IRRITABLE: SEVERAL DAYS
IF YOU CHECKED OFF ANY PROBLEMS ON THIS QUESTIONNAIRE, HOW DIFFICULT HAVE THESE PROBLEMS MADE IT FOR YOU TO DO YOUR WORK, TAKE CARE OF THINGS AT HOME, OR GET ALONG WITH OTHER PEOPLE: SOMEWHAT DIFFICULT
7. FEELING AFRAID AS IF SOMETHING AWFUL MIGHT HAPPEN: SEVERAL DAYS

## 2023-04-14 NOTE — RESULT ENCOUNTER NOTE
Mychart sent.   Can we place derm surg referral and schedule for excision x 1 for severely atypical mole.   Otherwise, follow-up in 6 months for a FBSE.   Thanks!    Joel Bradshaw MD  Pronouns: he/him/his    Department of Dermatology  [facility]: Phone: [phone], Fax:[phone]  [facility]: Phone: [phone] Fax: [phone]

## 2023-04-15 NOTE — PROGRESS NOTES
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  Treat conservatively   - diclofenac (VOLTAREN) 1 % topical gel  Dispense: 50 g; Refill: 3  Warm soaks for stiffness       Return if symptoms worsen or fail to improve.    Carmella Miles MD PhD  [facility]  Time note (e4, 30'): The total of my time (on the date of service) for this service was 31 minutes, including discussion/face-to-face, chart review, interpretation not otherwise reported, documentation, and updating of the computerized record.      Estefany Villanueva is a 52 year old, presenting for the following health issues:  Pain finger right hand     HPI  51 yo female with discomfort right hand fifth finger for about a year.  She is right handed  - no injury.  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      Objective    /74   Pulse 64   There is no height or weight on file to calculate BMI.  Physical Exam   GENERAL: healthy, alert and no distress  MS: 5th finger right hand - minimal swelling PIP and DIP joints - full ROM - no swelling and good ROM both hands  Motor 5/5 bilateral hands.    SKIN: no suspicious lesions or rashes  PSYCH: mentation appears normal, affect normal/bright  Carmella Miles MD, PhD

## 2023-04-17 ENCOUNTER — TELEPHONE (OUTPATIENT)
Dept: DERMATOLOGY | Facility: CLINIC | Age: 53
End: 2023-04-17
Payer: COMMERCIAL

## 2023-04-17 ENCOUNTER — OFFICE VISIT (OUTPATIENT)
Dept: FAMILY MEDICINE | Facility: CLINIC | Age: 53
End: 2023-04-17
Attending: FAMILY MEDICINE
Payer: COMMERCIAL

## 2023-04-17 VITALS — HEART RATE: 64 BPM | SYSTOLIC BLOOD PRESSURE: 113 MMHG | DIASTOLIC BLOOD PRESSURE: 74 MMHG

## 2023-04-17 DIAGNOSIS — M19.041 PRIMARY OSTEOARTHRITIS OF RIGHT HAND: Primary | ICD-10-CM

## 2023-04-17 DIAGNOSIS — D22.9 ATYPICAL MOLE: Primary | ICD-10-CM

## 2023-04-17 PROBLEM — G43.909 MIGRAINE HEADACHE: Status: ACTIVE | Noted: 2023-04-17

## 2023-04-17 PROCEDURE — G0463 HOSPITAL OUTPT CLINIC VISIT: HCPCS | Performed by: FAMILY MEDICINE

## 2023-04-17 PROCEDURE — 99214 OFFICE O/P EST MOD 30 MIN: CPT | Performed by: FAMILY MEDICINE

## 2023-04-17 ASSESSMENT — ANXIETY QUESTIONNAIRES
GAD7 TOTAL SCORE: 5
6. BECOMING EASILY ANNOYED OR IRRITABLE: SEVERAL DAYS
5. BEING SO RESTLESS THAT IT IS HARD TO SIT STILL: NOT AT ALL
7. FEELING AFRAID AS IF SOMETHING AWFUL MIGHT HAPPEN: SEVERAL DAYS
3. WORRYING TOO MUCH ABOUT DIFFERENT THINGS: NOT AT ALL
GAD7 TOTAL SCORE: 5
2. NOT BEING ABLE TO STOP OR CONTROL WORRYING: SEVERAL DAYS
1. FEELING NERVOUS, ANXIOUS, OR ON EDGE: SEVERAL DAYS

## 2023-04-17 ASSESSMENT — PATIENT HEALTH QUESTIONNAIRE - PHQ9: 5. POOR APPETITE OR OVEREATING: SEVERAL DAYS

## 2023-04-17 NOTE — PATIENT INSTRUCTIONS
Try voltaren gel  2-4 times/day prn symptoms.    Warm water soaks help  Activity with the hand may exacerbate it.

## 2023-04-17 NOTE — TELEPHONE ENCOUNTER
Mychart sent.   Can we place derm surg referral and schedule for excision x 1 for severely atypical mole.   Otherwise, follow-up in 6 months for a FBSE.   Thanks!     Joel Bradshaw MD   Pronouns: he/him/his      Department of Dermatology   [facility]: Phone: [phone], Fax:[phone]   [facility]: Phone: [phone] Fax: [phone]

## 2023-04-18 ENCOUNTER — TELEPHONE (OUTPATIENT)
Dept: DERMATOLOGY | Facility: CLINIC | Age: 53
End: 2023-04-18
Payer: COMMERCIAL

## 2023-04-18 NOTE — TELEPHONE ENCOUNTER
Left vm with direct ph#.     Excision-Atypical Mole-Right Back. Return pt.     Lauryn Ramey, Procedure  4/18/2023 9:35 AM

## 2023-04-19 NOTE — TELEPHONE ENCOUNTER
Pt called in and scheduled an excision with Dr. Rivers    Date of Surgery: 06/21    Surgery type: excision    Consult scheduled: Not Applicable    Has patient had mohs with us before? Yes    Additional comments: pt requested first available with either  And at either office. Return pt of Dr. Nirali Ramey on 4/19/2023 at 8:44 AM

## 2023-05-06 ASSESSMENT — HEADACHE IMPACT TEST (HIT 6)
HOW OFTEN DID HEADACHS LIMIT CONCENTRATION ON WORK OR DAILY ACTIVITY: NEVER
HOW OFTEN HAVE YOU FELT TOO TIRED TO WORK BECAUSE OF YOUR HEADACHES: NEVER
HOW OFTEN HAVE YOU FELT FED UP OR IRRITATED BECAUSE OF YOUR HEADACHES: NEVER
WHEN YOU HAVE HEADACHES HOW OFTEN IS THE PAIN SEVERE: SOMETIMES
WHEN YOU HAVE A HEADACHE HOW OFTEN DO YOU WISH YOU COULD LIE DOWN: SOMETIMES
HIT6 TOTAL SCORE: 48
HOW OFTEN DO HEADACHES LIMIT YOUR DAILY ACTIVITIES: SOMETIMES

## 2023-05-09 ENCOUNTER — OFFICE VISIT (OUTPATIENT)
Dept: NEUROLOGY | Facility: CLINIC | Age: 53
End: 2023-05-09
Attending: FAMILY MEDICINE
Payer: COMMERCIAL

## 2023-05-09 ENCOUNTER — PRE VISIT (OUTPATIENT)
Dept: NEUROLOGY | Facility: CLINIC | Age: 53
End: 2023-05-09

## 2023-05-09 VITALS
RESPIRATION RATE: 16 BRPM | HEART RATE: 65 BPM | DIASTOLIC BLOOD PRESSURE: 65 MMHG | SYSTOLIC BLOOD PRESSURE: 100 MMHG | OXYGEN SATURATION: 99 %

## 2023-05-09 DIAGNOSIS — R26.89 BALANCE PROBLEMS: Primary | ICD-10-CM

## 2023-05-09 DIAGNOSIS — G43.009 MIGRAINE WITHOUT AURA AND WITHOUT STATUS MIGRAINOSUS, NOT INTRACTABLE: ICD-10-CM

## 2023-05-09 DIAGNOSIS — G43.719 INTRACTABLE CHRONIC MIGRAINE WITHOUT AURA AND WITHOUT STATUS MIGRAINOSUS: ICD-10-CM

## 2023-05-09 PROCEDURE — 99205 OFFICE O/P NEW HI 60 MIN: CPT | Performed by: NURSE PRACTITIONER

## 2023-05-09 RX ORDER — NAPROXEN 500 MG/1
500 TABLET ORAL EVERY 12 HOURS PRN
Qty: 30 TABLET | Refills: 3 | Status: SHIPPED | OUTPATIENT
Start: 2023-05-09 | End: 2024-02-07

## 2023-05-09 RX ORDER — SUMATRIPTAN 50 MG/1
50-100 TABLET, FILM COATED ORAL
Qty: 12 TABLET | Refills: 9 | Status: SHIPPED | OUTPATIENT
Start: 2023-05-09 | End: 2024-01-19

## 2023-05-09 ASSESSMENT — PAIN SCALES - GENERAL: PAINLEVEL: NO PAIN (0)
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
## 2023-05-09 NOTE — PATIENT INSTRUCTIONS
Plan:  Stay hydrated  Migraine prevention -vit B2 200 mg twice daily OTC for at least 3 month if tolerated   Rescue treatment -sumatriptan  mg at migriane onset. Limit use to no more than 9 days per month.   May try naproxen 500 mg every 12 hours if needed with food if tolerated in combination with sumatriptan for migraine rescue treatment.   Ondansetron as needed   Follow up in 3-6 months or sooner if needed        Balance problems and difficulties with tandam gait on the exam. Balance problems since childhood and stable. Mother complains of balance problems.   Plan:  Brain MRI for difficulties with tandam gait   Vit B12 and MMA   Balance PT for balance evaluation and  assessment   If getting worse -would suggest general neurology referral
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
## 2023-05-12 DIAGNOSIS — G43.009 MIGRAINE WITHOUT AURA AND WITHOUT STATUS MIGRAINOSUS, NOT INTRACTABLE: ICD-10-CM

## 2023-05-12 RX ORDER — SUMATRIPTAN 50 MG/1
TABLET, FILM COATED ORAL
Qty: 18 TABLET | Refills: 4 | OUTPATIENT
Start: 2023-05-12

## 2023-06-09 ENCOUNTER — TELEPHONE (OUTPATIENT)
Dept: DERMATOLOGY | Facility: CLINIC | Age: 53
End: 2023-06-09
Payer: COMMERCIAL

## 2023-06-09 NOTE — TELEPHONE ENCOUNTER
I left a message for patient to call [facility].    Kay is scheduled for an excision on 6/21/23.  Please complete previsit checklist.  Haven Miles RN

## 2023-06-12 NOTE — TELEPHONE ENCOUNTER
Writer called pt, no answer. Left message for pt to return our call at [phone].      Samra Lofton RN on 6/12/2023 at 9:02 AM

## 2023-06-12 NOTE — TELEPHONE ENCOUNTER
Excision/Mohs previsit information                                                    Diagnosis: severe atypia  Site(s): right back    Over the counter Chlorhexidine surgical soap to wash all skin below the belly button twice before surgery should be recommended for the following:  - Surgical sites below the waist  - Immunosuppressed  - Previous surgical site infection  - Anticipated wound care challenges    Medication & Allergy Information                                                      Review and update allergy and medication list.    Do you take the following medications:    Coumadin, Eliquis, Pradaxa, Xarelto:  NO   -If on Coumadin, INR should be checked within 7 days of surgery.  Range should be 3.5 or less or within therapeutic range.    Past Medical History                                                    Do you currently or have you previously had any of the following conditions:      Hepatitis:  NO    HIV/AIDS:  NO    Prolonged bleeding or bleeding disorder:  NO    Pacemaker or Defibrillator:  NO.      History of artificial or heart valve replacement:  NO    Endocarditis (inflammation of the inner lining of the heart's chambers and valves):  NO    Have you ever had a prosthetic joint infection:  NO    Pregnant or Breastfeeding:  N/A    Mobility device (wheelchair, transfer difficulty): NO    Important Reminders:                                                        Ok to take all of their medications as prescribed    Patients can eat, no need to be fasting    Patient will not be able to get the site wet for 48 hrs    No submerging wound in standing water (lake, pool, bathtub, hot tub) for 2 weeks    No physical activity for 48 hrs (further restrictions will be discussed by MD at time of visit)    Haven Miles RN

## 2023-06-13 ENCOUNTER — TELEPHONE (OUTPATIENT)
Dept: DERMATOLOGY | Facility: CLINIC | Age: 53
End: 2023-06-13
Payer: COMMERCIAL

## 2023-06-13 NOTE — TELEPHONE ENCOUNTER
Lvm my chart msg for patient to rescheduled the  following:    Appointment type: Return  Provider:   Return date: 10-11-23  Specialty phone number: [phone]

## 2023-06-18 ENCOUNTER — MYC MEDICAL ADVICE (OUTPATIENT)
Dept: FAMILY MEDICINE | Facility: CLINIC | Age: 53
End: 2023-06-18
Payer: COMMERCIAL

## 2023-06-21 ENCOUNTER — OFFICE VISIT (OUTPATIENT)
Dept: DERMATOLOGY | Facility: CLINIC | Age: 53
End: 2023-06-21
Payer: COMMERCIAL

## 2023-06-21 VITALS — DIASTOLIC BLOOD PRESSURE: 64 MMHG | HEART RATE: 64 BPM | SYSTOLIC BLOOD PRESSURE: 101 MMHG | OXYGEN SATURATION: 100 %

## 2023-06-21 DIAGNOSIS — D22.9 ATYPICAL MOLE: ICD-10-CM

## 2023-06-21 PROCEDURE — 12032 INTMD RPR S/A/T/EXT 2.6-7.5: CPT | Performed by: DERMATOLOGY

## 2023-06-21 PROCEDURE — 11402 EXC TR-EXT B9+MARG 1.1-2 CM: CPT | Performed by: DERMATOLOGY

## 2023-06-21 PROCEDURE — 88305 TISSUE EXAM BY PATHOLOGIST: CPT | Performed by: DERMATOLOGY

## 2023-06-21 NOTE — PROGRESS NOTES
DERMATOLOGY EXCISION PROCEDURE NOTE    Dermatology Problem List:  # Neoplasm of uncertain behavior  - Scalp, s/p shave bx 4/4/23, r/o SK vs benign nevus  - Right back, s/p shave bx 4/4/23, r/o atypical nevus vs melanoma    1. Melanoma in situ, R shoulder, s/p MMS 11/21/22    2. History of NMSC:              - BCC, R superior forehead s/p MMS 10/2014              - BCC, R [employer], s/p MMS in [place] in 2012        3. Multiple (>100) atypical nevi on trunk and extremities. Prior bxs with moderate dysplasia.  - Compound dysplastic nevus Sev atypia, R back, s/p excision 6/21/23    4. AKs: LN2, field therapy states no reaction (per patient), but also documented as improved after field therapy with 5-FU 1-2x/week. S/P cryotherapy 3/1/22, 4/4/23  5. Flat warts, dorsal PIPs. Cryo 3/14/16, 3/1/22, 9/22/22  6. Acne: tretinoin 0.025%  7. Sebaceous hyperplasia. tretinoin 0.025% cream.  8. 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 Resolved with TMC 0.1% cream  9. Compound dysplastic nevus, right leg.   ____________________________________________    NAME OF PROCEDURE: Excision intermediate layered linear closure  Staff surgeon: Dr. Huang Rivers   Resident: none  Scrub Nurse: Megan Carbajal, ELLY    PRE-OPERATIVE DIAGNOSIS:  Dysplastic nevus  POST-OPERATIVE DIAGNOSIS: Same   LOCATION: Right Back  FINAL EXCISION SIZE(DEFECT SIZE): 1.9 X 1.7 cm  MARGIN: 0.5 cm  FINAL REPAIR LENGTH: 5.0 cm   ANESTHESIA: 9ml 1% lidocaine with 1:100,000 epinephrine    INDICATIONS: This patient presented with a 0.9 X 0.7cm Dysplastic nevus. Excision was indicated. We discussed the principles of treatment and most likely complications including scarring, bleeding, infection, incomplete excision, wound dehiscence, pain, nerve damage, and recurrence. Informed consent was obtained and the patient underwent the procedure as follows:    PROCEDURE: The patient was taken to the operative suite. Time-out was performed.  The treatment  area was anesthetized with 1% lidocaine with epinephrine. The area was prepped with Chlorhexidine and rinsed with sterile saline and draped with sterile towels. The lesion was delineated and excised down to subcutaneous fat in a elliptical manner. Hemostasis was obtained by electrocoagulation.     REPAIR: An intermediate layered linear closure was selected as the procedure which would maximally preserve both function and cosmesis.    After the excision of the tumor, the area was carefully  undermined. Hemostasis was obtained with Heat Cautery electrocoagulation.  Closure was oriented so that the wound was in the patient's natural skin tension lines. The subcutaneous and dermal layers were then closed with 4-0 monocryl sutures. The epidermis was then carefully approximated along the length of the wound using 4-0 monocryl  running subcuticular sutures.     Estimated blood loss was less than 10 ml for all surgical sites. A sterile pressure dressing was applied and wound care instructions, with a written handout, were given. The patient was [facility] alert and ambulatory.    The patient elected for pathology results to automatically release and understands that the clinical staff will contact them as soon as possible to notify them of the results.        Dr. Rivers was immediately available for the entire surgery and was physicially present for the key portions of the procedure.    Anatomic Pathology Results: pending    Clinical Follow-Up: PRN wound care, follow up with gen derm in October 2023.     Staff Involved:  Medical Student /Staff     Provider Disclosure:   The documentation recorded by the scribe accurately reflects the services I personally performed and the decisions made by me. The senior medical student assisted with the procedure.     Huang Rivers DO    Department of Dermatology  [facility]:  Phone: [phone], Fax:[phone]  [facility]: Phone: [phone], Fax: [phone]

## 2023-06-21 NOTE — NURSING NOTE
The following medication was given:     MEDICATION:  Lidocaine with epinephrine 1% 1:[id number]  ROUTE: SQ  SITE: see procedure note  DOSE: 9ml  LOT #: [id number]  : [employer]  EXPIRATION DATE: 12/31/2024  NDC#: 02494-222-93  Was there drug waste? no  Multi-dose vial: Yes    Megan Carbajal  June 21, 2023          Mastisol, Steri-Strips, Tegaderm and pressure dressing applied to excision site on Right back.  Wound care instructions reviewed with patient and AVS provided.  Patient verbalized understanding.  Patient will follow up for suture removal: N/A.  No further questions or concerns at this time.

## 2023-06-21 NOTE — PATIENT INSTRUCTIONS
Excision Wound Care Instructions with Steri-Strips    Possible complications of any surgical procedure are bleeding, infection, scarring, alteration in skin color and sensation, muscle weakness in the area, wound dehiscence or seperation, or recurrence of the lesion or disease. On occasion, after healing, a secondary procedure or revision may be recommended in order to obtain the best cosmetic or functional result.     After your surgery, a pressure bandage will be placed over the area that has sutures. This will help prevent bleeding. Please, follow these instructions as they will help you to prevent complications as your wound heals.    For the First 48 hours After Surgery:    Leave the pressure bandage on and keep it dry. If it should come loose, you may retape it, but do not take it off.    Relax and take it easy. Do not do any vigorous exercise, heavy lifting, or bending forward. This could cause the wound to bleed.    Post-operative pain is usually mild. You may alternate between 1000 mg of Tylenol (acetaminophen) and 400 mg of Ibuprofen every 4 hours.  Do not take more than 4,000 mg of acetaminophen and more than 3200 mg of Ibuprofen in a 24 hr period.  Avoid alcohol and vitamin E as these may increase your tendency to bleed.    You may put an ice pack around the bandaged area for 20 minutes every 2-3 hours. This may help reduce swelling, bruising, and pain. Make sure the ice pack is waterproof so that the pressure bandage does not get wet.     If your bandage is saturated with blood apply firm pressure over the bandage with a washcloth for 15 minutes. If bleeding continues after applying pressure for 15 minutes then go to the nearest emergency room.      48 Hours After Surgery:    Remove outer white bandage down to clear plastic film (Tegaderm).  You may notice dark brown, dried blood under the Tegaderm.  This is normal.    Leave the clear plastic film (Tegaderm) on for up to 2 weeks, as long as it is  intact.  If it falls off prior to 2 weeks follow daily wound care below.  If it stays intact for the full 2 weeks, then remove and treat as normal, healthy skin.      Daily Wound Care (if Tegaderm and Steri-Strips fall off prior to 2 weeks):    Wash wound with a mild soap and water.  Use caution when washing the wound, be gentle and do not let the forceful shower stream hit the wound directly. DO NOT WASH WITH HYDROGEN PEROXIDE AS THIS MIGHT CAUSE THE STITCHES TO DISSOLVE FASTER THAN WHAT WE WANT.    Pat dry.    Apply Vaseline (from a new container or tube) over the suture line with a Q-tip until it is completely healed. It is very important to keep the wound continuously moist, as wounds heal best in a moist environment.    Keep the site covered until it's healed.  You can cover it with a Telfa (non-stick) dressing and tape or a band-aid until healed with normal, healthy skin.      Call Us If:    You have pain that is not controlled with Tylenol/Ibuprofen.    You have signs or symptoms of an infection, such as: fever over 100 degrees F, redness, warmth, or foul-smelling or yellow/creamy drainage from the wound.      Who should I call with questions?  [facility]: [phone]  [facility]: [phone]  For urgent needs outside of business hours call the [facility] at [phone] and ask for the dermatology resident on call

## 2023-06-21 NOTE — NURSING NOTE
Kay Ceja's goals for this visit include:   Chief Complaint   Patient presents with     mohs     Patient here for  Excision x1 - R back severe atypia        She requests these members of her care team be copied on today's visit information:     PCP: Carmella Miles    Referring Provider:  Joel Bradshaw MD  [address] [address]    /64   Pulse 64   SpO2 100%     Do you need any medication refills at today's visit? Zaida Carbajal EMT

## 2023-06-21 NOTE — LETTER
6/21/2023         RE: Kay eCja  [address]  [facility] [address]        Dear Colleague,    Thank you for referring your patient, Kay Ceja, to the [facility]. Please see a copy of my visit note below.    DERMATOLOGY EXCISION PROCEDURE NOTE    Dermatology Problem List:  # Neoplasm of uncertain behavior  - Scalp, s/p shave bx 4/4/23, r/o SK vs benign nevus  - Right back, s/p shave bx 4/4/23, r/o atypical nevus vs melanoma    1. Melanoma in situ, R shoulder, s/p MMS 11/21/22    2. History of NMSC:              - BCC, R superior forehead s/p MMS 10/2014              - BCC, R L[employer], s/p MMS in [place] in 2012        3. Multiple (>100) atypical nevi on trunk and extremities. Prior bxs with moderate dysplasia.  - Compound dysplastic nevus Sev atypia, R back, s/p excision 6/21/23    4. AKs: LN2, field therapy states no reaction (per patient), but also documented as improved after field therapy with 5-FU 1-2x/week. S/P cryotherapy 3/1/22, 4/4/23  5. Flat warts, dorsal PIPs. Cryo 3/14/16, 3/1/22, 9/22/22  6. Acne: tretinoin 0.025%  7. Sebaceous hyperplasia. tretinoin 0.025% cream.  8. 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 Resolved with TMC 0.1% cream  9. Compound dysplastic nevus, right leg.   ____________________________________________    NAME OF PROCEDURE: Excision intermediate layered linear closure  Staff surgeon: Dr. Huang Rivers   Resident: none  Scrub Nurse: Megan Carbajal, ELLY    PRE-OPERATIVE DIAGNOSIS:  Dysplastic nevus  POST-OPERATIVE DIAGNOSIS: Same   LOCATION: Right Back  FINAL EXCISION SIZE(DEFECT SIZE): 1.9 X 1.7 cm  MARGIN: 0.5 cm  FINAL REPAIR LENGTH: 5.0 cm   ANESTHESIA: 9ml 1% lidocaine with 1:100,000 epinephrine    INDICATIONS: This patient presented with a 0.9 X 0.7cm Dysplastic nevus. Excision was indicated. We discussed the principles of treatment and most likely complications including scarring, bleeding, infection, incomplete  excision, wound dehiscence, pain, nerve damage, and recurrence. Informed consent was obtained and the patient underwent the procedure as follows:    PROCEDURE: The patient was taken to the operative suite. Time-out was performed.  The treatment area was anesthetized with 1% lidocaine with epinephrine. The area was prepped with Chlorhexidine and rinsed with sterile saline and draped with sterile towels. The lesion was delineated and excised down to subcutaneous fat in a elliptical manner. Hemostasis was obtained by electrocoagulation.     REPAIR: An intermediate layered linear closure was selected as the procedure which would maximally preserve both function and cosmesis.    After the excision of the tumor, the area was carefully  undermined. Hemostasis was obtained with Heat Cautery electrocoagulation.  Closure was oriented so that the wound was in the patient's natural skin tension lines. The subcutaneous and dermal layers were then closed with 4-0 monocryl sutures. The epidermis was then carefully approximated along the length of the wound using 4-0 monocryl  running subcuticular sutures.     Estimated blood loss was less than 10 ml for all surgical sites. A sterile pressure dressing was applied and wound care instructions, with a written handout, were given. The patient was discharged from the [facility] alert and ambulatory.    The patient elected for pathology results to automatically release and understands that the clinical staff will contact them as soon as possible to notify them of the results.        Dr. Rivers was immediately available for the entire surgery and was physicially present for the key portions of the procedure.    Anatomic Pathology Results: pending    Clinical Follow-Up: PRN wound care, follow up with gen derm in October 2023.     Staff Involved:  Medical Student /Staff     Provider Disclosure:   The documentation recorded by the scribe accurately reflects the services I  personally performed and the decisions made by me. The senior medical student assisted with the procedure.     Huang Rivers DO    Department of Dermatology  [facility]: Phone: [phone], Fax:[phone]  [facility]: Phone: [phone], Fax: [phone]      Again, thank you for allowing me to participate in the care of your patient.        Sincerely,        Huang Rivers MD

## 2023-06-23 ENCOUNTER — TELEPHONE (OUTPATIENT)
Dept: DERMATOLOGY | Facility: CLINIC | Age: 53
End: 2023-06-23
Payer: COMMERCIAL

## 2023-06-23 NOTE — TELEPHONE ENCOUNTER
----- Message from Huang Rivers MD sent at 6/21/2023  6:23 PM CDT -----  Regarding: gen derm follow up  Hi everyone,   It looks like this patient's October appointment with Dr. Bradshaw has been cancelled. Can I have her schedule with another gen derm provider? Thank you.

## 2023-06-23 NOTE — TELEPHONE ENCOUNTER
Have reached out to patient multiple times to reschedule appointment for skin check in October due to Dr. Bradshaw leaving clinic.      Left a detailed message regarding rescheduling skin check appointment.

## 2023-06-24 LAB
PATH REPORT.COMMENTS IMP SPEC: NORMAL
PATH REPORT.COMMENTS IMP SPEC: NORMAL
PATH REPORT.FINAL DX SPEC: NORMAL
PATH REPORT.GROSS SPEC: NORMAL
PATH REPORT.MICROSCOPIC SPEC OTHER STN: NORMAL
PATH REPORT.RELEVANT HX SPEC: NORMAL

## 2023-06-27 ENCOUNTER — LAB (OUTPATIENT)
Dept: LAB | Facility: CLINIC | Age: 53
End: 2023-06-27
Payer: COMMERCIAL

## 2023-06-27 ENCOUNTER — ANCILLARY PROCEDURE (OUTPATIENT)
Dept: MRI IMAGING | Facility: CLINIC | Age: 53
End: 2023-06-27
Attending: NURSE PRACTITIONER
Payer: COMMERCIAL

## 2023-06-27 DIAGNOSIS — R26.89 BALANCE PROBLEMS: ICD-10-CM

## 2023-06-27 LAB — VIT B12 SERPL-MCNC: 1570 PG/ML (ref 232–1245)

## 2023-06-27 PROCEDURE — 83921 ORGANIC ACID SINGLE QUANT: CPT | Performed by: NURSE PRACTITIONER

## 2023-06-27 PROCEDURE — 82607 VITAMIN B-12: CPT | Performed by: NURSE PRACTITIONER

## 2023-06-27 PROCEDURE — 36415 COLL VENOUS BLD VENIPUNCTURE: CPT | Performed by: PATHOLOGY

## 2023-06-27 PROCEDURE — 70553 MRI BRAIN STEM W/O & W/DYE: CPT | Mod: GC | Performed by: RADIOLOGY

## 2023-06-27 PROCEDURE — A9585 GADOBUTROL INJECTION: HCPCS | Mod: JZ | Performed by: RADIOLOGY

## 2023-06-27 PROCEDURE — 99000 SPECIMEN HANDLING OFFICE-LAB: CPT | Performed by: PATHOLOGY

## 2023-06-27 RX ORDER — GADOBUTROL 604.72 MG/ML
7.5 INJECTION INTRAVENOUS ONCE
Status: COMPLETED | OUTPATIENT
Start: 2023-06-27 | End: 2023-06-27

## 2023-06-27 RX ADMIN — GADOBUTROL 5.5 ML: 604.72 INJECTION INTRAVENOUS at 15:05

## 2023-06-27 NOTE — DISCHARGE INSTRUCTIONS
MRI Contrast Discharge Instructions    The IV contrast you received today will pass out of your body in your  urine. This will happen in the next 24 hours. You will not feel this process.  Your urine will not change color.    Drink at least 4 extra glasses of water or juice today (unless your doctor  has restricted your fluids). This reduces the stress on your kidneys.  You may take your regular medicines.    If you are on dialysis: It is best to have dialysis today.    If you have a reaction: Most reactions happen right away. If you have  any new symptoms after leaving the hospital (such as hives or swelling),  call your hospital at the correct number below. Or call your family doctor.  If you have breathing distress or wheezing, call 911.    Special instructions: ***    I have read and understand the above information.    Signature:______________________________________ Date:___________    Staff:__________________________________________ Date:___________     Time:__________    [place] [facility] Departments:    ___[place]: [phone]  ___[facility]: [phone]  ___[place]: [phone] ___[facility]: [phone]  ___[facility]: [phone]  ___[facility]: [phone]  ___[phone]  ___[facility]: [phone]  ___[phone]

## 2023-06-27 NOTE — TELEPHONE ENCOUNTER
[place] sent to pt about rescheduling. Keep in pool until pt reads message and gets scheduled.    Samra Lofton RN on 6/27/2023 at 8:36 AM

## 2023-06-27 NOTE — RESULT ENCOUNTER NOTE
Sandeep Villanueva,   Your brain MRI images/results reviewed. No acute abnormal findings at this time to explain your symptoms. Follow up as discussed.   Take care,  Laurel

## 2023-06-29 LAB — METHYLMALONATE SERPL-SCNC: 0.16 UMOL/L (ref 0–0.4)

## 2023-06-29 NOTE — TELEPHONE ENCOUNTER
Pt read [employer] Mobile message but did not respond. Closing encounter.    Samra Lofton RN on 6/29/2023 at 9:14 AM

## 2023-07-05 ENCOUNTER — THERAPY VISIT (OUTPATIENT)
Dept: PHYSICAL THERAPY | Facility: CLINIC | Age: 53
End: 2023-07-05
Attending: NURSE PRACTITIONER
Payer: COMMERCIAL

## 2023-07-05 DIAGNOSIS — R26.89 BALANCE PROBLEMS: ICD-10-CM

## 2023-07-05 PROCEDURE — 97161 PT EVAL LOW COMPLEX 20 MIN: CPT | Mod: GP | Performed by: PHYSICAL THERAPIST

## 2023-07-05 NOTE — PROGRESS NOTES
PHYSICAL THERAPY EVALUATION  Type of Visit: Evaluation    See electronic medical record for Abuse and Falls Screening details.    Subjective      Presenting condition or subjective complaint: referral for balance tx.  Reports fall one year ago while backpacking in [place].  She fell forward without warning and does not know why.   Reports being unable to perform tandem gait during Neurology visit.  She thinks these may be isolated incidents as she is quite active and her problems do not appear to be consistent.     Prior diagnostic imaging/testing results: MRI  of head, negative for pathology.   Prior therapy history for the same diagnosis, illness or injury: No      Living Environment  Social support: With a significant other or spouse   Type of home: House   Stairs to enter the home: Yes   Is there a railing: Yes   Ramp: No   Stairs inside the home: Yes 5 Is there a railing: Yes   Help at home: None  Equipment owned:       Employment: Yes x  Hobbies/Interests: x    Patient goals for therapy: consider specific balance exercises if there is a problem     Objective   BALANCE:   SPECIAL TESTS  Functional Gait Assessment (FGA) TOTAL SCORE: (MAXIMUM SCORE 30): 29 29/30; WNL       25' Walk Test   13 steps, 7 seconds                         Single Leg Stance Right (sec) 30   Single Leg Stance Left (sec) 30   Modified CTSIB Conditions (sec) Cond 1: 30  Cond 2: 30  Cond 4: 30  Cond 5 : 30    WNL       SENSATION: LE Sensation WNL    COORDINATION: heel > shin, finger > nose intact B  MUSCLE TONE: WNL    Assessment & Plan   CLINICAL IMPRESSIONS   Medical Diagnosis: Balance problems    Treatment Diagnosis: subjective balance problem   Impression/Assessment: Patient is a 52 year old female with subjective complaints.   Her balance testing was WNL today and difficulties she had during Neurology visit appeared resolved today.  No further OP PT indicated.      Clinical Decision Making (Complexity):   Clinical Presentation:  Stable/Uncomplicated  Clinical Presentation Rationale: based on medical and personal factors listed in PT evaluation  Clinical Decision Making (Complexity): Low complexity    PLAN OF CARE  Treatment Interventions:  Evaluation only.       Frequency of Treatment: Evaluation only  Duration of Treatment: Evaluation only    Risks and benefits of evaluation/treatment have been explained.   Patient/Family/caregiver agrees with Plan of Care.     Evaluation Time:     PT Eval, Low Complexity Minutes (04674): 35      Signing Clinician: Jonathan Qureshi PT

## 2023-07-13 ENCOUNTER — MYC MEDICAL ADVICE (OUTPATIENT)
Dept: FAMILY MEDICINE | Facility: CLINIC | Age: 53
End: 2023-07-13
Payer: COMMERCIAL

## 2023-07-13 DIAGNOSIS — T70.20XA EFFECTS OF HIGH ALTITUDE, INITIAL ENCOUNTER: ICD-10-CM

## 2023-07-14 RX ORDER — ACETAZOLAMIDE 125 MG/1
TABLET ORAL
Qty: 30 TABLET | Refills: 1 | Status: SHIPPED | OUTPATIENT
Start: 2023-07-14

## 2023-07-31 NOTE — RESULT ENCOUNTER NOTE
Sandeep Villanueva,  Your vit B12 results did not show deficiency. It shows you are good on vitamin B12 now.   Let me know if you have any questions.   Take care,  Laurel

## 2023-09-30 ENCOUNTER — HEALTH MAINTENANCE LETTER (OUTPATIENT)
Age: 53
End: 2023-09-30

## 2023-10-03 ENCOUNTER — TELEPHONE (OUTPATIENT)
Dept: DERMATOLOGY | Facility: CLINIC | Age: 53
End: 2023-10-03
Payer: COMMERCIAL

## 2023-10-03 NOTE — TELEPHONE ENCOUNTER
Left Voicemail (2nd Attempt) informing pt the following has been cancelled:    Appointment type: Return  Provider: Dr. Toth  Return date: 12/12/23  Specialty phone number: [phone]

## 2023-10-31 ASSESSMENT — HEADACHE IMPACT TEST (HIT 6)
HOW OFTEN HAVE YOU FELT FED UP OR IRRITATED BECAUSE OF YOUR HEADACHES: RARELY
WHEN YOU HAVE A HEADACHE HOW OFTEN DO YOU WISH YOU COULD LIE DOWN: VERY OFTEN
HOW OFTEN DID HEADACHS LIMIT CONCENTRATION ON WORK OR DAILY ACTIVITY: RARELY
HIT6 TOTAL SCORE: 53
HOW OFTEN HAVE YOU FELT TOO TIRED TO WORK BECAUSE OF YOUR HEADACHES: RARELY
WHEN YOU HAVE HEADACHES HOW OFTEN IS THE PAIN SEVERE: SOMETIMES
HOW OFTEN DO HEADACHES LIMIT YOUR DAILY ACTIVITIES: RARELY

## 2023-11-07 ENCOUNTER — OFFICE VISIT (OUTPATIENT)
Dept: NEUROLOGY | Facility: CLINIC | Age: 53
End: 2023-11-07
Payer: COMMERCIAL

## 2023-11-07 ENCOUNTER — MYC MEDICAL ADVICE (OUTPATIENT)
Dept: NEUROLOGY | Facility: CLINIC | Age: 53
End: 2023-11-07

## 2023-11-07 VITALS
OXYGEN SATURATION: 94 % | WEIGHT: 108.1 LBS | DIASTOLIC BLOOD PRESSURE: 74 MMHG | BODY MASS INDEX: 18.56 KG/M2 | HEART RATE: 68 BPM | SYSTOLIC BLOOD PRESSURE: 111 MMHG

## 2023-11-07 DIAGNOSIS — G43.009 MIGRAINE WITHOUT AURA AND WITHOUT STATUS MIGRAINOSUS, NOT INTRACTABLE: Primary | ICD-10-CM

## 2023-11-07 PROCEDURE — 99213 OFFICE O/P EST LOW 20 MIN: CPT | Performed by: NURSE PRACTITIONER

## 2023-11-07 RX ORDER — NARATRIPTAN 2.5 MG/1
2.5 TABLET ORAL
Qty: 12 TABLET | Refills: 11 | Status: SHIPPED | OUTPATIENT
Start: 2023-11-07

## 2023-11-07 ASSESSMENT — PAIN SCALES - GENERAL: PAINLEVEL: MILD PAIN (2)

## 2023-11-07 NOTE — LETTER
"11/7/2023       RE: Kay Ceja  [address]  [place] [address]     Dear Colleague,    Thank you for referring your patient, Kay Ceja, to the [facility] [place] [facility]. Please see a copy of my visit note below.      Subjective  Kay is a 53 year old, presenting for the following health issues:  RECHECK (Return headache)    Total headache days 20 days per month and takes sumatriptan 5 days out of 7 days per week. Not severe because does not let them to progress to severe. Pressure around head and shoulders and a little bit dizzy and it feels like a \"snow ball\"  Last visit in May 2023-headaches were 7/month. Now headaches more often.     Plan:  Stop sumatriptan  Rescue treatment -a trial of naratriptan but limit use of naratriptan to no more than 9 days per month   We could try -nurtec or ubrelvy as needed if triptans seem to be less effective  Milder-moderate -naproxen as needed but limit use to no more than 14 days per month  Headache prevention options:  Amitriptyline or nortriptyline or gabapentin   Vit B2 -200 mg twice daily for 3 month OTC if tolerated and Magnesium 500 mg at bedtime OTC if tolerated -both may cause GI symptoms-diarrhea, constipation, indigestion feeling   CoQ10 100 mg three times daily OTC  Acupuncture  Biofeedback, muscle/breathing relaxation   PT with myofascial release and/or craniosacral therapy   Antimigraine devices-Cefaly, Nerivio, Relivion   Follow up -send a message what did you decide to do      SH:  Psychotherapist at [facility]     EXAM: MR BRAIN W/O & W CONTRAST  6/27/2023 3:15 PM      HISTORY: Balance problems        COMPARISON: Head CT 10/13/2021     TECHNIQUE: Sagittal T1-weighted, coronal T2-weighted, axial T2 FLAIR,  axial susceptibility weighted, and axial diffusion-weighted with ADC  map images of the brain were obtained without intravenous contrast.  After the administration of " intravenous contrast axial and coronal  fat-suppressed T1-weighted weighted images were obtained     CONTRAST: 5.5mL Gadavist.     FINDINGS:  No intracranial hemorrhage or mass effect. No hydrocephalus. Patent  major intracranial vascular flow voids. No abnormal restricted  diffusion. No abnormal enhancement. Few punctate T2 hyperintense foci  in the cerebral hemispheric white matter, not disproportionate to age.  Normal orbits. Paranasal sinuses and mastoid air cells are clear.                                                                   IMPRESSION:  1. No MR finding to account for symptoms.  2. Few scattered lesions in the white matter of the cerebral  hemispheres, favoring sequela of chronic small vessel ischemic change.     Objective   /74 (BP Location: Right arm, Patient Position: Sitting, Cuff Size: Adult Small)   Pulse 68   Wt 49 kg (108 lb 1.6 oz)   SpO2 94%   BMI 18.56 kg/m    Body mass index is 18.56 kg/m .  Physical Exam   Patient is alert and no in apparent acute distress,  mentation appears normal, judgement and insight intact, normal speech, no weakness observed.     I discussed all my recommendations with Kay Ceja who verbalizes understanding and comfortable with the plan.      21 minutes spent on the date of the encounter doing video access, results review,  meds review, treatment plan, documentation and further activities as noted above      Again, thank you for allowing me to participate in the care of your patient.      Sincerely,    KESHIA Davila CNP

## 2023-11-07 NOTE — TELEPHONE ENCOUNTER
Left Voicemail (1st Attempt) and Sent Mychart (1st Attempt) for the patient to call back and schedule the following:    Appointment type: Return Headache  Provider: Laurel Napoles  Return date: 02/07/24 or near  Specialty phone number: [phone]  Additional appointment(s) needed: N/A  Additonal Notes: OK to be video visit or in person    Joanne Crawford on 11/7/2023 at 2:32 PM

## 2023-11-07 NOTE — PROGRESS NOTES
"  Subjective   Kay is a 53 year old, presenting for the following health issues:  RECHECK (Return headache)    Total headache days 20 days per month and takes sumatriptan 5 days out of 7 days per week. Not severe because does not let them to progress to severe. Pressure around head and shoulders and a little bit dizzy and it feels like a \"snow ball\"  Last visit in May 2023-headaches were 7/month. Now headaches more often.     Plan:  Stop sumatriptan  Rescue treatment -a trial of naratriptan but limit use of naratriptan to no more than 9 days per month   We could try -nurtec or ubrelvy as needed if triptans seem to be less effective  Milder-moderate -naproxen as needed but limit use to no more than 14 days per month  Headache prevention options:  Amitriptyline or nortriptyline or gabapentin   Vit B2 -200 mg twice daily for 3 month OTC if tolerated and Magnesium 500 mg at bedtime OTC if tolerated -both may cause GI symptoms-diarrhea, constipation, indigestion feeling   CoQ10 100 mg three times daily OTC  Acupuncture  Biofeedback, muscle/breathing relaxation   PT with myofascial release and/or craniosacral therapy   Antimigraine devices-Cefaly, Nerivio, Relivion   Follow up -send a message what did you decide to do      SH:  Psychotherapist at [facility]     EXAM: MR BRAIN W/O & W CONTRAST  6/27/2023 3:15 PM      HISTORY: Balance problems        COMPARISON: Head CT 10/13/2021     TECHNIQUE: Sagittal T1-weighted, coronal T2-weighted, axial T2 FLAIR,  axial susceptibility weighted, and axial diffusion-weighted with ADC  map images of the brain were obtained without intravenous contrast.  After the administration of intravenous contrast axial and coronal  fat-suppressed T1-weighted weighted images were obtained     CONTRAST: 5.5mL Gadavist.     FINDINGS:  No intracranial hemorrhage or mass effect. No hydrocephalus. Patent  major intracranial vascular flow voids. No abnormal restricted  diffusion. No abnormal " enhancement. Few punctate T2 hyperintense foci  in the cerebral hemispheric white matter, not disproportionate to age.  Normal orbits. Paranasal sinuses and mastoid air cells are clear.                                                                      IMPRESSION:  1. No MR finding to account for symptoms.  2. Few scattered lesions in the white matter of the cerebral  hemispheres, favoring sequela of chronic small vessel ischemic change.     Objective    /74 (BP Location: Right arm, Patient Position: Sitting, Cuff Size: Adult Small)   Pulse 68   Wt 49 kg (108 lb 1.6 oz)   SpO2 94%   BMI 18.56 kg/m    Body mass index is 18.56 kg/m .  Physical Exam   Patient is alert and no in apparent acute distress,  mentation appears normal, judgement and insight intact, normal speech, no weakness observed.     I discussed all my recommendations with Kay Ceja who verbalizes understanding and comfortable with the plan.      21 minutes spent on the date of the encounter doing video access, results review,  meds review, treatment plan, documentation and further activities as noted above    KESHIA Landis, CNP [facility]  Headache certified  [facility]

## 2023-11-07 NOTE — PATIENT INSTRUCTIONS
Plan:  Stop sumatriptan  Rescue treatment -a trial of naratriptan but limit use of naratriptan to no more than 9 days per month   We could try -nurtec or ubrelvy as needed if triptans seem to be less effective  Milder-moderate -naproxen as needed but limit use to no more than 14 days per month  Headache prevention options:  Amitriptyline or nortriptyline or gabapentin   Vit B2 -200 mg twice daily for 3 month OTC if tolerated and Magnesium 500 mg at bedtime OTC if tolerated -both may cause GI symptoms-diarrhea, constipation, indigestion feeling   CoQ10 100 mg three times daily OTC  Acupuncture  Biofeedback, muscle/breathing relaxation   PT with myofascial release and/or craniosacral therapy   Antimigraine devices-Cefaly, Nerivio, Relivion   Follow up -send a message what did you decide to do

## 2023-11-09 NOTE — TELEPHONE ENCOUNTER
Left Voicemail (2nd Attempt) for the patient to call back and schedule the following:    Appointment type: Return Headache  Provider: Laurel Napoles  Return date: 02/07/24 or near  Specialty phone number: [phone]  Additional appointment(s) needed: N/A  Additonal Notes: OK to be video visit or in person    Abhishek Lyle on 11/9/2023 at 5:40 PM

## 2023-12-04 DIAGNOSIS — M79.18 MYOFASCIAL PAIN: Primary | ICD-10-CM

## 2023-12-04 DIAGNOSIS — G43.719 INTRACTABLE CHRONIC MIGRAINE WITHOUT AURA AND WITHOUT STATUS MIGRAINOSUS: ICD-10-CM

## 2024-01-19 ENCOUNTER — OFFICE VISIT (OUTPATIENT)
Dept: DERMATOLOGY | Facility: CLINIC | Age: 54
End: 2024-01-19
Payer: COMMERCIAL

## 2024-01-19 DIAGNOSIS — Z85.828 HISTORY OF NONMELANOMA SKIN CANCER: ICD-10-CM

## 2024-01-19 DIAGNOSIS — Z87.898 HISTORY OF ATYPICAL NEVUS: ICD-10-CM

## 2024-01-19 DIAGNOSIS — D22.9 MULTIPLE BENIGN NEVI: ICD-10-CM

## 2024-01-19 DIAGNOSIS — D48.5 NEOPLASM OF UNCERTAIN BEHAVIOR OF SKIN: Primary | ICD-10-CM

## 2024-01-19 DIAGNOSIS — Z86.006 HX OF MELANOMA IN SITU: ICD-10-CM

## 2024-01-19 DIAGNOSIS — L57.0 ACTINIC KERATOSIS: ICD-10-CM

## 2024-01-19 DIAGNOSIS — L73.8 SENILE SEBACEOUS GLAND HYPERPLASIA: ICD-10-CM

## 2024-01-19 DIAGNOSIS — L82.0 INFLAMED SEBORRHEIC KERATOSIS: ICD-10-CM

## 2024-01-19 PROCEDURE — 17003 DESTRUCT PREMALG LES 2-14: CPT | Mod: XS | Performed by: DERMATOLOGY

## 2024-01-19 PROCEDURE — 99213 OFFICE O/P EST LOW 20 MIN: CPT | Mod: 25 | Performed by: DERMATOLOGY

## 2024-01-19 PROCEDURE — 88305 TISSUE EXAM BY PATHOLOGIST: CPT | Mod: 26 | Performed by: DERMATOLOGY

## 2024-01-19 PROCEDURE — 88360 TUMOR IMMUNOHISTOCHEM/MANUAL: CPT | Mod: 26 | Performed by: DERMATOLOGY

## 2024-01-19 PROCEDURE — 17000 DESTRUCT PREMALG LESION: CPT | Mod: XS | Performed by: DERMATOLOGY

## 2024-01-19 PROCEDURE — 17110 DESTRUCTION B9 LES UP TO 14: CPT | Mod: GC | Performed by: DERMATOLOGY

## 2024-01-19 PROCEDURE — 88342 IMHCHEM/IMCYTCHM 1ST ANTB: CPT | Mod: 26 | Performed by: DERMATOLOGY

## 2024-01-19 PROCEDURE — 11102 TANGNTL BX SKIN SINGLE LES: CPT | Mod: XS | Performed by: DERMATOLOGY

## 2024-01-19 PROCEDURE — 11103 TANGNTL BX SKIN EA SEP/ADDL: CPT | Mod: XS | Performed by: DERMATOLOGY

## 2024-01-19 PROCEDURE — 88341 IMHCHEM/IMCYTCHM EA ADD ANTB: CPT | Mod: TC | Performed by: DERMATOLOGY

## 2024-01-19 ASSESSMENT — PAIN SCALES - GENERAL: PAINLEVEL: NO PAIN (0)

## 2024-01-19 NOTE — NURSING NOTE
Dermatology Rooming Note    Kay Ceja's goals for this visit include:   Chief Complaint   Patient presents with    Skin Check     Here today for a skin check. No concerns.      Lucina Elizalde RN

## 2024-01-19 NOTE — PROGRESS NOTES
[facility] Dermatology Note  Encounter Date: Jan 19, 2024  Office Visit     Dermatology Problem List:  FBSE 1/19/24  1. Melanoma in situ, R shoulder, s/p MMS 11/21/22  2. History of NMSC:              - BCC, R superior forehead s/p MMS 10/2014              - BCC, R [employer], s/p MMS in [place] in 2012        3. Multiple (>100) atypical nevi on trunk and extremities. Prior bxs with moderate dysplasia.  - Compound dysplastic nevus Sev atypia, R back, s/p excision 6/21/23  - Collision mod DN and intradermal nevus, left leg, s/p bx 9/2022  - Intradermal melanocytic nevus, traumatized, scalp, s/p bx 04/04/23  4. AKs: LN2, field therapy states no reaction (per patient), but also documented as improved after field therapy with 5-FU 1-2x/week. S/P cryotherapy 3/1/22, 4/4/23  5. Flat warts, dorsal PIPs. Cryo 3/14/16, 3/1/22, 9/22/22  6. Acne: tretinoin 0.025%  7. Sebaceous hyperplasia. tretinoin 0.025% cream.  8. 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 Resolved with TMC 0.1% cream    ____________________________________________    Assessment & Plan:    # Neoplasms of uncertain behavior x3  - R posterior shoulder s/p shave bx 1/19/23, r/o BCC (see procedure note below)  - L lateral upper chest s/p shave bx 1/19/23, r/o melanoma (see procedure note below)  - R inner upper arm s/p shave bx 1/19/23, r/o melanoma (see procedure note below)    # Actinic keratoses  Superior border of L eyebrow x2, nose x3  Precancerous etiology reviewed.  - Cryotherapy performed (see procedure note)  - Sun protection reviewed with SPF30+ and sun protective clothing    # Sebaceous hyperplasia  L forehead, R cheek  Benign etiology reviewed. She desires treatment of these spots.   - Refer to cosmetic dermatology    # Inflamed seborrheic keratosis, ddx small wart  L lateral lower leg x1  Benign etiology reviewed. Opted for LN2 given inflamed/bothersome.  - Cryotherapy performed (see procedure  note)    # Hx of MIS.  # History of NMSC.   No evidence of recurrent disease.  - Continue photoprotection - recommend SPF 30 or higher with frequent reapplication  - Continue q6m skin exams until at least 11/2024  - Advised to monitor for changing, non-healing, bleeding, painful, changing, or otherwise symptomatic lesions    # Hx of atypical nevi  # Multiple benign nevi.   - Monitor for ABCDEs of melanoma   - Continue sun protection - recommend SPF 30 or higher with frequent application   - Return sooner if noticing changing or symptomatic lesions    Procedures Performed:   - Shave biopsy procedure note, location(s): R posterior shoulder, L lateral upper chest, R inner upper arm . After discussion of benefits and risks including but not limited to bleeding, infection, scar, incomplete removal, recurrence, and non-diagnostic biopsy, written consent and photographs were obtained. The area was cleaned with isopropyl alcohol. 0.5mL of 1% lidocaine with epinephrine was injected to obtain adequate anesthesia of lesion(s). Shave biopsy at site(s) performed. Hemostasis was achieved with aluminium chloride. Petrolatum ointment and a sterile dressing were applied. The patient tolerated the procedure and no complications were noted. The patient was provided with verbal and written post care instructions.   - Cryotherapy procedure note, location(s): L eyebrown and nose (AKs), L lateral lower leg (iSK). After verbal consent and discussion of risks and benefits including, but not limited to, dyspigmentation/scar, blister, and pain, x5 AKs and x1 iSK lesion(s) was(were) treated with 1-2 mm freeze border for 1-2 cycles with liquid nitrogen. Post cryotherapy instructions were provided.    Follow-up: 6 month(s) in-person, or earlier for new or changing lesions    Staff, Resident, and Scribe:     Mary Eason MD  Dermatology Resident PGY4    Staff Physician Comments:   I saw and evaluated the patient with the resident and I agree  with the assessment and plan.  I was present for the entire minor procedure, key portions of major procedure, and examination.    Lisy Toth MD    Department of Dermatology  [facility]: Phone: [phone], Fax: [phone]  1/23/2024     ____________________________________________    CC: Skin Check (Here today for a skin check. No concerns. )    HPI:  Ms. Kay Ceja is a(n) 53 year old female who presents today as a return patient for FBSE in the setting of history of MIS and NMSC. She was last seen 6m ago at which time a dysplastic nevus was biopsied and subsequently excised. Today she has several concerns as below:  - Rough spots above the L eyebrow that have been frozen before  - Some pink spots on the nose that have bled in the past  - A bump on the R posterior shoulder that has bled in the past  - A couple of dilated pores on the L forehead and on the R cheek  - An inflamed bump on the L lateral lower leg, she wonders if this is an SK    Denies any other new, changing, concerning spots. No f/c/weight loss.     Patient is otherwise feeling well, without additional skin concerns.    Labs Reviewed:  N/A    Physical Exam:  Vitals: There were no vitals taken for this visit.  SKIN: Full body skin exam excluding the genitals was performed including face, scalp, neck, ears, chest, back, bilateral arms, hands, bilateral legs, feet, and buttocks.   - On the superior border of the left eyebrow there are two subtle gritty pink papules  - Below the R eyelid there is a pedunculated flesh colored papule   - On the left lateral lower leg there is a waxy stuck on papule  - On the R posterior shoulder there is a ~0.4 cm shiny pink papule with arborizing vessels  - On the L lateral upper chest there is a 0.5 cm brown macule with scalloped/irregular borders and a few pigment globules centrally   - On the R inner upper arm there  is an irregularly shaped 0.8x0.6 cm light brown plaque with atypical pigment network  - There are a few hyperpigmented to pink ill defined macules on the nose  - A couple of yellow papules with central dells on the forehead, R cheek  - On the L lateral lower leg there is a small pink verrucous inflamed papule   - Many >100 regular brown to pink brown pigmented macules and papules/plaques are identified on the trunk and extremities. Largest lesions concentrated on the back/abdomen.   - Scattered brown macules on sun exposed areas.  - Waxy stuck on papules and plaques on trunk and extremities.   - No other lesions of concern on areas examined.     Medications:  Current Outpatient Medications   Medication    acetaZOLAMIDE (DIAMOX) 125 MG tablet    Calcium Carbonate-Vit D-Min (CALCIUM 1200 PO)    diclofenac (VOLTAREN) 1 % topical gel    Multiple Vitamins-Minerals (MULTIVITAMIN & MINERAL PO)    naproxen (NAPROSYN) 500 MG tablet    naratriptan (AMERGE) 2.5 MG tablet    ondansetron (ZOFRAN ODT) 4 MG ODT tab    tretinoin (RETIN-A) 0.025 % external cream     No current facility-administered medications for this visit.      Past Medical History:   Patient Active Problem List   Diagnosis    Anxiety    Amenorrhea    Osteopenia    Senile sebaceous gland hyperplasia    Atypical nevi    Basal cell carcinoma of right forehead s/p mms 10-13-14    Pain in joint, pelvic region and thigh    Pain in right ankle    Peroneal tendinitis    Traumatic pneumothorax, initial encounter    Edema    Stress fracture of ankle    Migraine headache     Past Medical History:   Diagnosis Date    Anxiety     Basal cell carcinoma     Insomnia     Migraines     Osteopenia     T score -1.9 hip and back 1/19/11    Vasomotor rhinitis     uses nasalcort        CC Referred Self, MD  No address on file on close of this encounter.

## 2024-01-19 NOTE — LETTER
1/19/2024       RE: Kay Ceja  [address]  [place] [address]     Dear Colleague,    Thank you for referring your patient, Kay Ceja, to the [facility] [place] [facility]. Please see a copy of my visit note below.    [facility] Dermatology Note  Encounter Date: Jan 19, 2024  Office Visit     Dermatology Problem List:  FBSE 1/19/24  1. Melanoma in situ, R shoulder, s/p MMS 11/21/22  2. History of NMSC:              - BCC, R superior forehead s/p MMS 10/2014              - BCC, R [employer], s/p MMS in [place] in 2012        3. Multiple (>100) atypical nevi on trunk and extremities. Prior bxs with moderate dysplasia.  - Compound dysplastic nevus Sev atypia, R back, s/p excision 6/21/23  - Collision mod DN and intradermal nevus, left leg, s/p bx 9/2022  - Intradermal melanocytic nevus, traumatized, scalp, s/p bx 04/04/23  4. AKs: LN2, field therapy states no reaction (per patient), but also documented as improved after field therapy with 5-FU 1-2x/week. S/P cryotherapy 3/1/22, 4/4/23  5. Flat warts, dorsal PIPs. Cryo 3/14/16, 3/1/22, 9/22/22  6. Acne: tretinoin 0.025%  7. Sebaceous hyperplasia. tretinoin 0.025% cream.  8. 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 Resolved with TMC 0.1% cream    ____________________________________________    Assessment & Plan:    # Neoplasms of uncertain behavior x3  - R posterior shoulder s/p shave bx 1/19/23, r/o BCC (see procedure note below)  - L lateral upper chest s/p shave bx 1/19/23, r/o melanoma (see procedure note below)  - R inner upper arm s/p shave bx 1/19/23, r/o melanoma (see procedure note below)    # Actinic keratoses  Superior border of L eyebrow x2, nose x3  Precancerous etiology reviewed.  - Cryotherapy performed (see procedure note)  - Sun protection reviewed with SPF30+ and sun protective clothing    # Sebaceous hyperplasia  L  forehead, R cheek  Benign etiology reviewed. She desires treatment of these spots.   - Refer to cosmetic dermatology    # Inflamed seborrheic keratosis, ddx small wart  L lateral lower leg x1  Benign etiology reviewed. Opted for LN2 given inflamed/bothersome.  - Cryotherapy performed (see procedure note)    # Hx of MIS.  # History of NMSC.   No evidence of recurrent disease.  - Continue photoprotection - recommend SPF 30 or higher with frequent reapplication  - Continue q6m skin exams until at least 11/2024  - Advised to monitor for changing, non-healing, bleeding, painful, changing, or otherwise symptomatic lesions    # Hx of atypical nevi  # Multiple benign nevi.   - Monitor for ABCDEs of melanoma   - Continue sun protection - recommend SPF 30 or higher with frequent application   - Return sooner if noticing changing or symptomatic lesions    Procedures Performed:   - Shave biopsy procedure note, location(s): R posterior shoulder, L lateral upper chest, R inner upper arm . After discussion of benefits and risks including but not limited to bleeding, infection, scar, incomplete removal, recurrence, and non-diagnostic biopsy, written consent and photographs were obtained. The area was cleaned with isopropyl alcohol. 0.5mL of 1% lidocaine with epinephrine was injected to obtain adequate anesthesia of lesion(s). Shave biopsy at site(s) performed. Hemostasis was achieved with aluminium chloride. Petrolatum ointment and a sterile dressing were applied. The patient tolerated the procedure and no complications were noted. The patient was provided with verbal and written post care instructions.   - Cryotherapy procedure note, location(s): L eyebrown and nose (AKs), L lateral lower leg (iSK). After verbal consent and discussion of risks and benefits including, but not limited to, dyspigmentation/scar, blister, and pain, x5 AKs and x1 iSK lesion(s) was(were) treated with 1-2 mm freeze border for 1-2 cycles with liquid  nitrogen. Post cryotherapy instructions were provided.    Follow-up: 6 month(s) in-person, or earlier for new or changing lesions    Staff, Resident, and Scribe:     Mary Eason MD  Dermatology Resident PGY4    Staff Physician Comments:   I saw and evaluated the patient with the resident and I agree with the assessment and plan.  I was present for the entire minor procedure, key portions of major procedure, and examination.    Lisy Toth MD    Department of Dermatology  [facility]: Phone: [phone], Fax: [phone]  1/23/2024     ____________________________________________    CC: Skin Check (Here today for a skin check. No concerns. )    HPI:  Ms. Kay Ceja is a(n) 53 year old female who presents today as a return patient for FBSE in the setting of history of MIS and NMSC. She was last seen 6m ago at which time a dysplastic nevus was biopsied and subsequently excised. Today she has several concerns as below:  - Rough spots above the L eyebrow that have been frozen before  - Some pink spots on the nose that have bled in the past  - A bump on the R posterior shoulder that has bled in the past  - A couple of dilated pores on the L forehead and on the R cheek  - An inflamed bump on the L lateral lower leg, she wonders if this is an SK    Denies any other new, changing, concerning spots. No f/c/weight loss.     Patient is otherwise feeling well, without additional skin concerns.    Labs Reviewed:  N/A    Physical Exam:  Vitals: There were no vitals taken for this visit.  SKIN: Full body skin exam excluding the genitals was performed including face, scalp, neck, ears, chest, back, bilateral arms, hands, bilateral legs, feet, and buttocks.   - On the superior border of the left eyebrow there are two subtle gritty pink papules  - Below the R eyelid there is a pedunculated flesh colored papule   - On the left  lateral lower leg there is a waxy stuck on papule  - On the R posterior shoulder there is a ~0.4 cm shiny pink papule with arborizing vessels  - On the L lateral upper chest there is a 0.5 cm brown macule with scalloped/irregular borders and a few pigment globules centrally   - On the R inner upper arm there is an irregularly shaped 0.8x0.6 cm light brown plaque with atypical pigment network  - There are a few hyperpigmented to pink ill defined macules on the nose  - A couple of yellow papules with central dells on the forehead, R cheek  - On the L lateral lower leg there is a small pink verrucous inflamed papule   - Many >100 regular brown to pink brown pigmented macules and papules/plaques are identified on the trunk and extremities. Largest lesions concentrated on the back/abdomen.   - Scattered brown macules on sun exposed areas.  - Waxy stuck on papules and plaques on trunk and extremities.   - No other lesions of concern on areas examined.     Medications:  Current Outpatient Medications   Medication    acetaZOLAMIDE (DIAMOX) 125 MG tablet    Calcium Carbonate-Vit D-Min (CALCIUM 1200 PO)    diclofenac (VOLTAREN) 1 % topical gel    Multiple Vitamins-Minerals (MULTIVITAMIN & MINERAL PO)    naproxen (NAPROSYN) 500 MG tablet    naratriptan (AMERGE) 2.5 MG tablet    ondansetron (ZOFRAN ODT) 4 MG ODT tab    tretinoin (RETIN-A) 0.025 % external cream     No current facility-administered medications for this visit.      Past Medical History:   Patient Active Problem List   Diagnosis    Anxiety    Amenorrhea    Osteopenia    Senile sebaceous gland hyperplasia    Atypical nevi    Basal cell carcinoma of right forehead s/p mms 10-13-14    Pain in joint, pelvic region and thigh    Pain in right ankle    Peroneal tendinitis    Traumatic pneumothorax, initial encounter    Edema    Stress fracture of ankle    Migraine headache     Past Medical History:   Diagnosis Date    Anxiety     Basal cell carcinoma     Insomnia      Migraines     Osteopenia     T score -1.9 hip and back 1/19/11    Vasomotor rhinitis     uses nasalcort        CC Referred Self, MD  No address on file on close of this encounter.

## 2024-01-19 NOTE — NURSING NOTE
Lidocaine-epinephrine 1-1:973210 % injection   3mL once for one use, starting 1/19/2024 ending 1/19/2024,  2mL disp, R-0, injection  Injected by Lucina Elizalde RN

## 2024-01-19 NOTE — PATIENT INSTRUCTIONS
We will call you with your results    Wound Care After a Biopsy    What is a skin biopsy?  A skin biopsy allows the doctor to examine a very small piece of tissue under the microscope to determine the diagnosis and the best treatment for the skin condition. A local anesthetic (numbing medicine) is injected with a very small needle into the skin area to be tested. A small piece of skin is taken from the area. Sometimes a suture (stitch) is used.     What are the risks of a skin biopsy?  I will experience scar, bleeding, swelling, pain, crusting and redness. I may experience incomplete removal or recurrence. Risks of this procedure are excessive bleeding, bruising, infection, nerve damage, numbness, thick (hypertrophic or keloidal) scar and non-diagnostic biopsy.    How should I care for my wound for the first 24 hours?  Keep the wound dry and covered for 24 hours  If it bleeds, hold direct pressure on the area for 15 minutes. If bleeding does not stop, call us or go to the emergency room  Avoid strenuous exercise the first 1-2 days or as your doctor instructs you    How should I care for the wound after 24 hours?  After 24 hours, remove the bandage  You may bathe or shower as normal  If you had a scalp biopsy, you can shampoo as usual and can use shower water to clean the biopsy site daily  Clean the wound once a day with gentle soap and water  Do not scrub, be gentle  Apply white petroleum/Vaseline after cleaning the wound with a cotton swab or a clean finger, and keep the site covered with a Bandaid /bandage. Bandages are not necessary with a scalp biopsy  If you are unable to cover the site with a Bandaid /bandage, re-apply ointment 2-3 times a day to keep the site moist. Moisture will help with healing  Avoid strenuous activity for first 1-2 days  Avoid lakes, rivers, pools, and oceans until the stitches are removed or the site is healed    How do I clean my wound?  Wash hands thoroughly with soap or use hand   before all wound care  Clean the wound with gentle soap and water  Apply white petroleum/Vaseline  to wound after it is clean  Replace the Bandaid /bandage to keep the wound covered for the first few days or as instructed by your doctor  If you had a scalp biopsy, warm shower water to the area on a daily basis should suffice    What should I use to clean my wound?   Cotton-tipped applicators (Qtips )  White petroleum jelly (Vaseline ). Use a clean new container and use Q-tips to apply.  Bandaids  as needed  Gentle soap     How should I care for my wound long term?  Do not get your wound dirty  Keep up with wound care for one week or until the area is healed.  A small scab will form and fall off by itself when the area is completely healed. The area will be red and will become pink in color as it heals. Sun protection is very important for how your scar will turn out. Sunscreen with an SPF 30 or greater is recommended once the area is healed.  You should have some soreness but it should be mild and slowly go away over several days. Talk to your doctor about using tylenol for pain,    When should I call my doctor?  If you have increased:   Pain or swelling  Pus or drainage (clear or slightly yellow drainage is ok)  Temperature over 100F  Spreading redness or warmth around wound    When will I hear about my results?  The biopsy results can take 2 weeks to come back.  Your results will automatically release to [employer] before your provider has even reviewed them.  The clinic will call you with the results, send you a ShareM[employer] message, or have you schedule a follow-up clinic or phone time to discuss the results.  Contact our clinics if you do not hear from us in 2 weeks.    Who should I call with questions?  [facility]: [phone]  [facility]: [phone]  For urgent needs outside of business hours call the [facility] at [phone] and  ask for the dermatology resident on call        Checking for Skin Cancer  You can help find cancer early by checking your skin each month. There are 3 main kinds of skin cancer: melanoma, basal cell carcinoma, and squamous cell carcinoma. Doing monthly skin checks is the best way to find new marks, sores, or skin changes. Follow these instructions for checking your skin.   The ABCDEs of checking moles for melanoma   Check your moles or growths for signs of melanoma using ABCDE:   Asymmetry: The sides of the mole or growth don t match.  Border: The edges are ragged, notched, or blurred.  Color: The color within the mole or growth varies. It could be black, brown, tan, white, or shades of red, gray, or blue.  Diameter: The mole or growth is larger than   inch or 6 mm (size of a pencil eraser).  Evolving: The size, shape, texture, or color of the mole or growth is changing.     ABCDE's of moles on light skin.        ABCDE's of moles on dark skin may be harder to identify.     Checking for other types of skin cancer  Basal cell carcinoma or squamous cell carcinoma cause symptoms like:     A spot or mole that looks different from all other marks on your skin  Changes in how an area feels, such as itching, tenderness, or pain  Changes in the skin's surface, such as oozing, bleeding, or scaliness  A sore that doesn't heal  New swelling, redness, or spread of color beyond the border of a mole    Who s at risk?  Anyone of any skin color can get skin cancer. But you're at greater risk if you have:   Fair skin that freckles easily and burns instead of tanning  Light-colored or red hair  Light-colored eyes  Many moles or abnormal moles on your skin  A long history of unprotected exposure to sunlight or tanning beds  A history of many blistering sunburns as a child or teen  A family history of skin cancer  Been exposed to radiation or chemicals  A weakened immune system  Been exposed to arsenic  If you've had skin cancer in the  past, you're at high risk of having it again.   How to check your skin  Do your monthly skin checkups in front of a full-length mirror. Use a room with good lighting so it's easier to see. Use a hand mirror to look at hard-to-see places like your buttocks and back. You can also have a trusted friend or family member help you with these checks. Check every part of your body, including your:   Head (ears, face, neck, and scalp)  Torso (front, back, sides, and under breasts)  Arms (tops, undersides, and armpits)  Hands (palms, backs, and fingers, including under the nails)  Lower back, buttocks, and genitals  Legs (front, back, and sides)  Feet (tops, soles, toes, including under the nails, and between toes)  Watch for new spots on your skin or a spot that's changing in color, shape, size.   If you have a lot of moles, take digital photos of them each month. Make sure to take photos both up close and from a distance. These can help you see if any moles change over time.   Know your skin  Most skin changes aren't cancer. But if you see any changes in your skin, call your healthcare provider right away. Only they can tell you if a change is a problem. If you have skin cancer, seeing your provider can be the first step to getting the treatment that could save your life.   SUPR last reviewed this educational content on 10/1/2021    2466-6441 [employer]. All rights reserved. This information is not intended as a substitute for professional medical care. Always follow your healthcare professional's instructions.      Cryotherapy    What is it?  Use of a very cold liquid, such as liquid nitrogen, to freeze and destroy abnormal skin cells that need to be removed    What should I expect?  Tenderness and redness  A small blister that might grow and fill with dark purple blood. There may be crusting.  More than one treatment may be needed if the lesions do not go away.    How do I care for the treated  area?  Gently wash the area with your hands when bathing.  Use a thin layer of Vaseline to help with healing. You may use a Band-Aid.   The area should heal within 7-10 days and may leave behind a pink or lighter color.   Do not use an antibiotic or Neosporin ointment.   You may take acetaminophen (Tylenol) for pain.     Call your doctor if you have:  Severe pain  Signs of infection (warmth, redness, cloudy yellow drainage, and or a bad smell)  Questions or concerns    Who should I call with questions?      [facility]: [phone]      [facility]: [phone]      For urgent needs outside of business hours call the [facility] at [phone] and ask for the dermatology resident on call

## 2024-02-01 ENCOUNTER — TELEPHONE (OUTPATIENT)
Dept: DERMATOLOGY | Facility: CLINIC | Age: 54
End: 2024-02-01
Payer: COMMERCIAL

## 2024-02-01 DIAGNOSIS — D22.9 ATYPICAL MOLE: Primary | ICD-10-CM

## 2024-02-01 NOTE — TELEPHONE ENCOUNTER
Left vm for pt to call back and schedule for Excision: B. Left lateral upper chest: Compound dysplastic nevus with moderate atypia .    Lauryn Ramey, Procedure  2/1/2024 10:34 AM

## 2024-02-05 NOTE — TELEPHONE ENCOUNTER
Called patient to schedule surgery with Dr. Rojas    Date of Surgery: 03/06    Surgery type: excision     Consult scheduled: Not Applicable    Has patient had mohs with us before? Not Applicable    Additional comments: mele Ramey on 2/5/2024 at 9:53 AM

## 2024-02-05 NOTE — TELEPHONE ENCOUNTER
Left vm for pt to call back and schedule for Excision: B. Left lateral upper chest: Compound dysplastic nevus with moderate atypia .      Lauryn Ramey, Procedure  2/5/2024 9:31 AM

## 2024-02-07 ENCOUNTER — VIRTUAL VISIT (OUTPATIENT)
Dept: NEUROLOGY | Facility: CLINIC | Age: 54
End: 2024-02-07
Payer: COMMERCIAL

## 2024-02-07 DIAGNOSIS — G43.009 MIGRAINE WITHOUT AURA AND WITHOUT STATUS MIGRAINOSUS, NOT INTRACTABLE: ICD-10-CM

## 2024-02-07 DIAGNOSIS — G43.709 CHRONIC MIGRAINE WITHOUT AURA WITHOUT STATUS MIGRAINOSUS, NOT INTRACTABLE: Primary | ICD-10-CM

## 2024-02-07 PROCEDURE — 99212 OFFICE O/P EST SF 10 MIN: CPT | Mod: 95 | Performed by: NURSE PRACTITIONER

## 2024-02-07 RX ORDER — NAPROXEN 500 MG/1
500 TABLET ORAL EVERY 12 HOURS PRN
Qty: 30 TABLET | Refills: 3 | Status: SHIPPED | OUTPATIENT
Start: 2024-02-07

## 2024-02-07 ASSESSMENT — HEADACHE IMPACT TEST (HIT 6)
HOW OFTEN HAVE YOU FELT TOO TIRED TO WORK BECAUSE OF YOUR HEADACHES: SOMETIMES
HOW OFTEN HAVE YOU FELT FED UP OR IRRITATED BECAUSE OF YOUR HEADACHES: SOMETIMES
WHEN YOU HAVE HEADACHES HOW OFTEN IS THE PAIN SEVERE: SOMETIMES
HOW OFTEN DID HEADACHS LIMIT CONCENTRATION ON WORK OR DAILY ACTIVITY: SOMETIMES
HOW OFTEN DO HEADACHES LIMIT YOUR DAILY ACTIVITIES: SOMETIMES
HIT6 TOTAL SCORE: 61
WHEN YOU HAVE A HEADACHE HOW OFTEN DO YOU WISH YOU COULD LIE DOWN: VERY OFTEN

## 2024-02-07 ASSESSMENT — MIGRAINE DISABILITY ASSESSMENT (MIDAS)
HOW MANY DAYS WAS YOUR PRODUCTIVITY CUT IN HALF BECAUSE OF HEADACHES: 5
TOTAL SCORE: 20
HOW MANY DAYS WAS HOUSEWORK PRODUCTIVITY CUT IN HALF DUE TO HEADACHES: 0
HOW MANY DAYS DID YOU MISS WORK OR SCHOOL BECAUSE OF HEADACHES: 2
HOW MANY DAYS DID YOU NOT DO HOUSEWORK BECAUSE OF HEADACHES: 8
ON A SCALE FROM 0-10 ON AVERAGE HOW PAINFUL WERE HEADACHES: 4
HOW OFTEN WERE SOCIAL ACTIVITIES MISSED DUE TO HEADACHES: 5
HOW MANY DAYS IN THE PAST 3 MONTHS HAVE YOU HAD A HEADACHE: 34

## 2024-02-07 NOTE — NURSING NOTE
Is the patient currently in the state of [place]? YES    Visit mode:VIDEO    If the visit is dropped, the patient can be reconnected by: TELEPHONE VISIT: Phone number:   Telephone Information:   Mobile [phone]       Will anyone else be joining the visit? NO  (If patient encounters technical issues they should call [phone])    How would you like to obtain your AVS? MyChart    Are changes needed to the allergy or medication list? Pt stated no med changes    Reason for visit: Video Visit (Migraine follow-up )    Kathleen WILSON

## 2024-02-07 NOTE — LETTER
2/7/2024       RE: Kay Ceja  [address]  [place] [address]       Dear Colleague,    Thank you for referring your patient, Kay Ceja, to the [facility] [place] [facility]. Please see a copy of my visit note below.    Kay is a 53 year old who is being evaluated via a billable video visit.        Subjective  Kay is a 53 year old, presenting for the following health issues:  Video Visit (Migraine follow-up )  Initial Headache Clinic visit 5/9/2023, see note for details  Was a little bit rough stopping sumatriptan and use naratriptan sparingly for a couple of weeks. After that headaches triggered by multiple factors -sleep, travel, perfumes  Migraine twice per week in the last 6 weeks and always starts with Aleve.   Started B2/magnesium/Coq10 and may be helpful.   Patient would like to continue with the same treatment for a little bit longer.   Did not explore devices   Muscle relaxation   Questions of Ajovy and eligibility -discussed     Plan:  No changes and no new medications today  Follow up in 3-4 months or sooner if needed       Objective   Vitals - Patient Reported  Pain Score: No Pain (0)    Physical Exam   Patient is alert and no in apparent acute distress,  mentation appears normal, judgement and insight intact, normal speech, no weakness observed    I discussed all my recommendations with Kay Ceja who verbalizes understanding and comfortable with the plan.      16 minutes spent on the date of the encounter doing video access, chart  review, meds review, treatment plan, documentation and further activities as noted above        Again, thank you for allowing me to participate in the care of your patient.      Sincerely,    KESHIA Davila CNP

## 2024-02-07 NOTE — PROGRESS NOTES
Kay is a 53 year old who is being evaluated via a billable video visit.        Subjective   Kay is a 53 year old, presenting for the following health issues:  Video Visit (Migraine follow-up )  Initial Headache Clinic visit 5/9/2023, see note for details  Was a little bit rough stopping sumatriptan and use naratriptan sparingly for a couple of weeks. After that headaches triggered by multiple factors -sleep, travel, perfumes  Migraine twice per week in the last 6 weeks and always starts with Aleve.   Started B2/magnesium/Coq10 and may be helpful.   Patient would like to continue with the same treatment for a little bit longer.   Did not explore devices   Muscle relaxation   Questions of Ajovy and eligibility -discussed     Plan:  No changes and no new medications today  Follow up in 3-4 months or sooner if needed       Objective    Vitals - Patient Reported  Pain Score: No Pain (0)    Physical Exam   Patient is alert and no in apparent acute distress,  mentation appears normal, judgement and insight intact, normal speech, no weakness observed    I discussed all my recommendations with Kay Ceja who verbalizes understanding and comfortable with the plan.      16 minutes spent on the date of the encounter doing video access, chart  review, meds review, treatment plan, documentation and further activities as noted above    KESHIA Landis, CNP [facility]  Headache certified  [facility]    Video-Visit Details    Type of service:  Video Visit   Originating Location (pt. Location): Home  Distant Location (provider location):  Off-site  Platform used for Video Visit: Bette

## 2024-02-17 ENCOUNTER — HEALTH MAINTENANCE LETTER (OUTPATIENT)
Age: 54
End: 2024-02-17

## 2024-03-05 NOTE — PROGRESS NOTES
DERMATOLOGY EXCISION PROCEDURE NOTE    Dermatology Problem List:  FBSE 1/19/24  1. Melanoma in situ, R shoulder, S/p MMS 11/21/22  2. History of NMSC:  - BCC, R superior forehead S/p MMS 10/2014  - BCC, R temple, S/p MMS in [place] in 2012  3. Multiple (>100) atypical nevi on trunk and extremities. Prior Bx with moderate dysplasia.  - Intradermal Melanocytic Nevus, right posterior shoulder, S/p shave 1/19/24  - Compound DN, mod atypia, left lateral upper chest, S/p shave 1/19/24, S/p excision 3/6/24  - Compound DN, right inner upper arm, S/p shave 1/19/24  - Compound dysplastic nevus, severe atypia, R back, S/p excision 6/21/23  - Collision mod DN and intradermal nevus, left leg, S/p Bx 9/2022  - Intradermal melanocytic nevus, traumatized, scalp, S/p Bx 04/04/23  4. AK's: LN2, field therapy states no reaction (per patient), but also documented as improved after field therapy with 5-FU 1-2 x/week. S/P cryotherapy 3/1/22, 4/4/23  5. Flat warts, dorsal PIPs. Cryo 3/14/16, 3/1/22, 9/22/22  6. Acne: tretinoin 0.025%  7. Sebaceous hyperplasia. tretinoin 0.025% cream.  8. 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 Resolved with TMC 0.1% cream    NAME OF PROCEDURE: Excision intermediate layered linear closure  Staff surgeon: Lio Rojas MD  Fellow surgeon: none    PRE-OPERATIVE DIAGNOSIS:  Dysplastic nevus  POST-OPERATIVE DIAGNOSIS: Same   LOCATION: left lateral upper chest  FINAL EXCISION SIZE(DEFECT SIZE): 1.3 x 1.2 cm  MARGIN: 0.3 cm  FINAL REPAIR LENGTH: 3.0 cm   ANESTHESIA: 1% lidocaine with 1:100,000 epinephrine    INDICATIONS: This patient presented with a 0.7 x 0.6 cm Dysplastic nevus. Excision was indicated. We discussed the principles of treatment and most likely complications including scarring, bleeding, infection, incomplete excision, wound dehiscence, pain, nerve damage, and recurrence. Informed consent was obtained and the patient underwent the procedure as follows:    PROCEDURE:  The patient was taken to the operative suite. Time-out was performed.  The treatment area was anesthetized with 1% lidocaine with epinephrine. The area was prepped with Chlorhexidine and rinsed with sterile saline and draped with sterile towels. The lesion was delineated and excised down to subcutaneous fat in a elliptical manner. Hemostasis was obtained by electrocoagulation.      REPAIR: An intermediate layered linear closure was selected as the procedure which would maximally preserve both function and cosmesis.     After the excision of the tumor, the area was carefully undermined. Hemostasis was obtained with electrocoagulation.  Closure was oriented so that the wound was in the patient's natural skin tension lines. The subcutaneous and dermal layers were then closed with 4-0 Monocryl buried vertical mattress sutures. The epidermis was then carefully approximated along the length of the wound using 4-0 Monocryl running subcuticular sutures.      Estimated blood loss was less than 10 ml for all surgical sites. A sterile pressure dressing was applied and wound care instructions, with a written handout, were given. The patient was [facility] alert and ambulatory.     Dr. Lio Rojas performed the procedure.     Anatomic Pathology Results: pending    Clinical Follow-Up:   Future Appointments   Date Time Provider Department Center   9/11/2024  7:30 AM Lisy Toth MD [facility]   9/11/2024  9:30 AM Hannah Randall MD [facility]     Staff Involved:   Scribe/Staff    Scribe Disclosure:   I, BRITTANI LIMON, am serving as a scribe; to document services personally performed by Lio Rojas MD -based on data collection and the provider's statements to me.       Attending attestation:  I was present for key elements of the procedure and immediately available for all other portions of the procedure.  I have reviewed the note and edited it as necessary.    Lio Rojas  M.D.  Professor  Director of Dermatologic Surgery  Department of Dermatology  [facility]    Dermatology Surgery Clinic  [facility] [address] [address]

## 2024-03-05 NOTE — PATIENT INSTRUCTIONS
Wound care    I will experience scar, bleeding, swelling, pain, crusting and redness. I may experience incomplete removal or recurrence. Risks are bleeding, bruising, swelling, infection, nerve damage, & a large wound. A second procedure may be recommended to obtain the best cosmetic or functional result.       A three month office visit with your Surgeon is recommended for scar evaluation. Please reach out sooner if you have concerns about you surgical site/wound.    Caring for your skin after surgery    After your surgery, a pressure bandage will be placed over the area that has stitches. This is important to prevent bleeding. Please follow these instructions over the next 1 to 2 weeks. Following this regimen will help to prevent complications as your wound heals.     For the first 48 hours after your surgery:    Leave the pressure dressing on and keep it dry. If it should come loose, you may re-tape it, but do not take it off.  Relax and take it easy. Do not do any vigorous exercise or heavy lifting. This could cause the wound to bleed.  Post-Operative pain is usually mild. If you are able to take tylenol, You may take plain or extra-strength Tylenol (acetaminophen) As directed on the bottle (do not take more than 4,000mg in one day). If you are able to take ibuprofen, you can alternate the tylenol and ibuprofen.   Avoid alcohol as this may increase your tendency to bleed.   You may put an ice pack around the bandaged area for 20 minutes at a time as needed. This may help reduce swelling, bruising, and pain. Make sure the ice pack is waterproof so that the pressure bandage doesn t get wet.  If the wound is on the face try to sleep with your head elevated. Either in a recliner or propped up in bed, this will decrease swelling around the eyes.   You may see a small amount of drainage or blood on your pressure bandage. This is normal. However:  If drainage or bleeding continues or saturates the bandage, you will  "need to apply firm pressure over the bandage with a piece of gauze for 15 minutes.  If bleeding continues after applying pressure for 15 minutes, apply an ice pack to the bandaged area for 15 minutes.  If bleeding still continues, call our office or go to the nearest emergency room.    Remove pressure dressing 48 hours after surgery:    Carefully remove the pressure bandage. If it seems sticky or too difficult to get off, you may need to soak it off in the shower.  After the pressure dressing is removed, you may shower and get the wound wet. However, Do Not let the forceful stream of the shower hit the wound directly.  Follow these wound care and dressing change instructions:   In the shower wash the surgical site last with its own separate wash cloth.  You may allow water to run over the site. Take a clean wash cloth wet with soapy warm water and gently pat the suture site to help remove any crust or drainage.   Do Not rub or scrub the site    After site is clean pat dry and apply a thin layer of Vaseline ointment  over the suture site with a cotton swab or clean finger.   Cover the suture site with Telfa (non-stick) dressing. You may tape a piece of gauze over the Telfa for extra protection if you wish.  Continue wound care at least once a day, twice if you are active or around a contaminated environment.  Continue daily wound care until your surgical site is completely healed.   Dissolving stitches, if you have been told your stitches are dissolving they should dissolve in one to one and a half week.      If you are able to take acetaminophen (\"Tylenol,\" etc.) and ibuprofen (\"Advil\" or \"Motrin,\" etc.), then you may STAGGER these medications by taking 400 mg of ibuprofen (usually two tabs) every 8 hours and 1,000 mg of acetaminophen (e.g., two tabs of extra-strength Tylenol) every 8 hours.    This means, for example, that you could take the followin,000 mg of Tylenol, followed 4 hours later by 400 mg " Ibuprofen, followed 4 hours later with 1,000 mg of Tylenol, followed 4 hours later by 400 mg Ibuprofen, followed 4 hours later with 1,000 mg of Tylenol, and so forth.     Essentially, you can take either 1,000 mg of Tylenol or 400 mg of ibuprofen in alternating fashion EVERY FOUR HOURS.    Do NOT exceed more than 4,000 mg of Tylenol or 3,200 mg of ibuprofen per 24 hours. If you are not able to take Tylenol or ibuprofen as above due to other health issues (or a physician has told you directly that you are not allowed to take one of them, say due to pre-existing severe liver or kidney issues), then disregard the above directions.    Scientific evidence supports that this combination/schedule of pain medications is just as effective, if not more effective, than taking a narcotic pain medicine.       Follow up will be a 3 month scar evaluation either in person or via a telephone visit with you sending in a photo via [employer]. Unless you have been told to follow up sooner or if you have concerns and would like to be see sooner. Please call or send us in a [employer]ikC[employer] message if possible and attach a photo.        What to expect:    The first couple of days your wound may be tender and may bleed slightly when doing wound care.  There may be swelling and bruising around the wound, especially if it is near the eyes. For your comfort, you may apply ice or cold compresses to the bruises after your have removed the pressure bandage.  The area around your wound may be numb for several weeks or even months.  You may experience periodic sharp pain or mild itching around the wound as it heals.   The suture line will look dark for a while but will lighten over time.       When to call us:    You have bleeding that will not stop after applying pressure and ice.  You have pain that is not controlled with Tylenol (acetaminophen.)  You have signs or symptoms of an infection such as:  Fever over 100 degrees Fahrenheit  Redness, warmth or  foul-smelling drainage from the wound  If you have any questions, or are not sure how to take care of the wound.    Phone numbers:    During business hours (M-F 8:00-4:30 p.m.)  [facility]-  [phone] Option 1 appt. desk  [phone]  Option 3 nurse triage line  ---------------------------------------------------------  Evenings/Weekends/Holidays  Hospital - [phone]   TTY for hearing cjwlfedk-[phone]  *Ask  to page dermatologist on-call  Emergency Acuh-[phone]  TTY for hearing impaired- [phone]

## 2024-03-06 ENCOUNTER — TELEPHONE (OUTPATIENT)
Dept: DERMATOLOGY | Facility: CLINIC | Age: 54
End: 2024-03-06

## 2024-03-06 ENCOUNTER — OFFICE VISIT (OUTPATIENT)
Dept: DERMATOLOGY | Facility: CLINIC | Age: 54
End: 2024-03-06
Payer: COMMERCIAL

## 2024-03-06 DIAGNOSIS — D23.5 DYSPLASTIC NEVUS OF TRUNK: Primary | ICD-10-CM

## 2024-03-06 PROCEDURE — 88305 TISSUE EXAM BY PATHOLOGIST: CPT | Mod: TC | Performed by: DERMATOLOGY

## 2024-03-06 PROCEDURE — 88305 TISSUE EXAM BY PATHOLOGIST: CPT | Mod: 26 | Performed by: DERMATOLOGY

## 2024-03-06 PROCEDURE — 12032 INTMD RPR S/A/T/EXT 2.6-7.5: CPT | Mod: GC | Performed by: DERMATOLOGY

## 2024-03-06 PROCEDURE — 11402 EXC TR-EXT B9+MARG 1.1-2 CM: CPT | Mod: GC | Performed by: DERMATOLOGY

## 2024-03-06 ASSESSMENT — PAIN SCALES - GENERAL: PAINLEVEL: NO PAIN (0)

## 2024-03-06 NOTE — NURSING NOTE
Chief Complaint   Patient presents with    excision      Left lateral upper chest      Gina LISA CMA

## 2024-03-06 NOTE — TELEPHONE ENCOUNTER
Follow up call attempted & left voicemail following excision procedure with Dr. Rojas.       Are you having pain?   Are you taking pain medication?   Are you applying ice?    Have you had any noticeable bleeding through the bandage?    Do you have any other concerns?        Please call [phone] if you have any questions or concerns.

## 2024-03-06 NOTE — LETTER
3/6/2024       RE: Kay Ceja  [address]  [facility] [address]     Dear Colleague,    Thank you for referring your patient, Kay Ceja, to the [facility] DERMATOLOGIC SURGERY CLINIC [place] at [facility]. Please see a copy of my visit note below.    DERMATOLOGY EXCISION PROCEDURE NOTE    Dermatology Problem List:  FBSE 1/19/24  1. Melanoma in situ, R shoulder, S/p MMS 11/21/22  2. History of NMSC:  - BCC, R superior forehead S/p MMS 10/2014  - BCC, R temple, S/p MMS in [place] in 2012  3. Multiple (>100) atypical nevi on trunk and extremities. Prior Bx with moderate dysplasia.  - Intradermal Melanocytic Nevus, right posterior shoulder, S/p shave 1/19/24  - Compound DN, mod atypia, left lateral upper chest, S/p shave 1/19/24, S/p excision 3/6/24  - Compound DN, right inner upper arm, S/p shave 1/19/24  - Compound dysplastic nevus, severe atypia, R back, S/p excision 6/21/23  - Collision mod DN and intradermal nevus, left leg, S/p Bx 9/2022  - Intradermal melanocytic nevus, traumatized, scalp, S/p Bx 04/04/23  4. AK's: LN2, field therapy states no reaction (per patient), but also documented as improved after field therapy with 5-FU 1-2 x/week. S/P cryotherapy 3/1/22, 4/4/23  5. Flat warts, dorsal PIPs. Cryo 3/14/16, 3/1/22, 9/22/22  6. Acne: tretinoin 0.025%  7. Sebaceous hyperplasia. tretinoin 0.025% cream.  8. 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 Resolved with TMC 0.1% cream    NAME OF PROCEDURE: Excision intermediate layered linear closure  Staff surgeon: Lio Rojas MD  Fellow surgeon: none    PRE-OPERATIVE DIAGNOSIS:  Dysplastic nevus  POST-OPERATIVE DIAGNOSIS: Same   LOCATION: left lateral upper chest  FINAL EXCISION SIZE(DEFECT SIZE): 1.3 x 1.2 cm  MARGIN: 0.3 cm  FINAL REPAIR LENGTH: 3.0 cm   ANESTHESIA: 1% lidocaine with 1:100,000 epinephrine    INDICATIONS: This patient presented with a 0.7 x 0.6 cm  Dysplastic nevus. Excision was indicated. We discussed the principles of treatment and most likely complications including scarring, bleeding, infection, incomplete excision, wound dehiscence, pain, nerve damage, and recurrence. Informed consent was obtained and the patient underwent the procedure as follows:    PROCEDURE: The patient was taken to the operative suite. Time-out was performed.  The treatment area was anesthetized with 1% lidocaine with epinephrine. The area was prepped with Chlorhexidine and rinsed with sterile saline and draped with sterile towels. The lesion was delineated and excised down to subcutaneous fat in a elliptical manner. Hemostasis was obtained by electrocoagulation.      REPAIR: An intermediate layered linear closure was selected as the procedure which would maximally preserve both function and cosmesis.     After the excision of the tumor, the area was carefully undermined. Hemostasis was obtained with electrocoagulation.  Closure was oriented so that the wound was in the patient's natural skin tension lines. The subcutaneous and dermal layers were then closed with 4-0 Monocryl buried vertical mattress sutures. The epidermis was then carefully approximated along the length of the wound using 4-0 Monocryl running subcuticular sutures.      Estimated blood loss was less than 10 ml for all surgical sites. A sterile pressure dressing was applied and wound care instructions, with a written handout, were given. The patient was discharged from the [facility] alert and ambulatory.     Dr. Lio Rojas performed the procedure.     Anatomic Pathology Results: pending    Clinical Follow-Up:   Future Appointments   Date Time Provider Department Center   9/11/2024  7:30 AM Lisy Toth MD [facility]   9/11/2024  9:30 AM Hannah Randall MD [facility]     Staff Involved:   Scribe/Staff    Scribe Disclosure:   BRITTANI GOLDSTEIN, am serving as a scribe; to document services  personally performed by Lio Rojas MD -based on data collection and the provider's statements to me.       Attending attestation:  I was present for key elements of the procedure and immediately available for all other portions of the procedure.  I have reviewed the note and edited it as necessary.    Lio Rojas M.D.  Professor  Director of Dermatologic Surgery  Department of Dermatology  [facility]    Dermatology Surgery Clinic  [facility] [place] [id number]

## 2024-04-03 ASSESSMENT — HEADACHE IMPACT TEST (HIT 6)
HIT6 TOTAL SCORE: 64
HOW OFTEN DO HEADACHES LIMIT YOUR DAILY ACTIVITIES: SOMETIMES
HOW OFTEN HAVE YOU FELT FED UP OR IRRITATED BECAUSE OF YOUR HEADACHES: VERY OFTEN
WHEN YOU HAVE A HEADACHE HOW OFTEN DO YOU WISH YOU COULD LIE DOWN: VERY OFTEN
HOW OFTEN DID HEADACHS LIMIT CONCENTRATION ON WORK OR DAILY ACTIVITY: VERY OFTEN
HOW OFTEN HAVE YOU FELT TOO TIRED TO WORK BECAUSE OF YOUR HEADACHES: VERY OFTEN
WHEN YOU HAVE HEADACHES HOW OFTEN IS THE PAIN SEVERE: SOMETIMES

## 2024-04-03 ASSESSMENT — MIGRAINE DISABILITY ASSESSMENT (MIDAS)
HOW MANY DAYS WAS YOUR PRODUCTIVITY CUT IN HALF BECAUSE OF HEADACHES: 5
HOW MANY DAYS IN THE PAST 3 MONTHS HAVE YOU HAD A HEADACHE: 37
HOW MANY DAYS DID YOU NOT DO HOUSEWORK BECAUSE OF HEADACHES: 5
HOW MANY DAYS WAS HOUSEWORK PRODUCTIVITY CUT IN HALF DUE TO HEADACHES: 12
TOTAL SCORE: 31
HOW OFTEN WERE SOCIAL ACTIVITIES MISSED DUE TO HEADACHES: 6
ON A SCALE FROM 0-10 ON AVERAGE HOW PAINFUL WERE HEADACHES: 6
HOW MANY DAYS DID YOU MISS WORK OR SCHOOL BECAUSE OF HEADACHES: 3

## 2024-04-04 ENCOUNTER — VIRTUAL VISIT (OUTPATIENT)
Dept: NEUROLOGY | Facility: CLINIC | Age: 54
End: 2024-04-04
Payer: COMMERCIAL

## 2024-04-04 DIAGNOSIS — G43.709 CHRONIC MIGRAINE WITHOUT AURA WITHOUT STATUS MIGRAINOSUS, NOT INTRACTABLE: Primary | ICD-10-CM

## 2024-04-04 PROCEDURE — 99213 OFFICE O/P EST LOW 20 MIN: CPT | Mod: 95 | Performed by: NURSE PRACTITIONER

## 2024-04-04 RX ORDER — TOPIRAMATE 25 MG/1
TABLET, FILM COATED ORAL
Qty: 90 TABLET | Refills: 5 | Status: SHIPPED | OUTPATIENT
Start: 2024-04-04 | End: 2024-08-21 | Stop reason: SINTOL

## 2024-04-04 NOTE — PATIENT INSTRUCTIONS
Plan:  Migraine headache prevention-a trial of topiramate 25 mg at bedtime for one week, then 50 mg at bedtime for one week, then take 75 mg (3 tabs) at bedtime if tolerated . Side effects-stay hydrated and drink at least 10+glasses of water to decrease risk of kidney stones, may cause tingling in the hands and feet, taste changes, glaucoma, nausea, weight stable or loss, mood changes.    Rescue treatment -naratriptan and naproxen as needed   Ondasetron(zofran) as needed for nausea   Follow up in 3 months or sooner if needed

## 2024-04-04 NOTE — PROGRESS NOTES
Kay is a 53 year old who is being evaluated via a billable video visit.      Subjective   Kay is a 53 year old, presenting for the following health issues:  Video Visit (Follow-up )  Last Headache Clinic visit in Feb 2024, see note for details.   Changes since last visit.   Naratriptan+naproxen  took on Saturday -helped with the pain but it did not resolved swaying symptoms and cannot lay down on the floor. Hiking is very important and has a month in the summer will go hiking.   In the past 7 days migraine not associated with a significant migraine pain but more with a motion/balance symptoms. Lately getting worse. No other new neurological symptoms reported.   Does not want to use any psychotropic medications -wellbutrin, luvax, prozac, zolft tried in the past and did not like how they make her feel. Family history of bipolar and saw family members detrimental effect from psychotropic meds. Would avoid BBB with history of depression and borderline low blood pressure  Discussed a trial of topiramate for migraine prevention.     Headache treatment tried  Sumatriptan, naratriptan, naproxen     Impression   Chronic migraines  Possible vestibular migraines      Treatment Plan:  Migraine headache prevention-a trial of topiramate 25 mg at bedtime for one week, then 50 mg at bedtime for one week, then take 75 mg (3 tabs) at bedtime if tolerated . Side effects-stay hydrated and drink at least 10+glasses of water to decrease risk of kidney stones, may cause tingling in the hands and feet, taste changes, glaucoma, nausea, weight stable or loss, mood changes.    Rescue treatment -naratriptan and naproxen as needed   Ondasetron(zofran) as needed for nausea   Follow up in 3 months or sooner if needed     DATA reviewed  EXAM: MR BRAIN W/O & W CONTRAST  6/27/2023 3:15 PM      HISTORY: Balance problems        COMPARISON: Head CT 10/13/2021     TECHNIQUE: Sagittal T1-weighted, coronal T2-weighted, axial T2 FLAIR,  axial  susceptibility weighted, and axial diffusion-weighted with ADC  map images of the brain were obtained without intravenous contrast.  After the administration of intravenous contrast axial and coronal  fat-suppressed T1-weighted weighted images were obtained     CONTRAST: 5.5mL Gadavist.     FINDINGS:  No intracranial hemorrhage or mass effect. No hydrocephalus. Patent  major intracranial vascular flow voids. No abnormal restricted  diffusion. No abnormal enhancement. Few punctate T2 hyperintense foci  in the cerebral hemispheric white matter, not disproportionate to age.  Normal orbits. Paranasal sinuses and mastoid air cells are clear.                                                                      IMPRESSION:  1. No MR finding to account for symptoms.  2. Few scattered lesions in the white matter of the cerebral  hemispheres, favoring sequela of chronic small vessel ischemic change.     CT HEAD W/O CONTRAST 10/13/2021 1:25 PM     History: Facial trauma   ICD-10:     Comparison: None     Technique: Using multidetector thin collimation helical acquisition  technique, axial, coronal and sagittal CT images from the skull base  to the vertex were obtained without intravenous contrast.   (topogram) image(s) also obtained and reviewed.     Findings: There is no intracranial hemorrhage, mass effect, or midline  shift. Gray/white matter differentiation in both cerebral hemispheres  is preserved. Ventricles are proportionate to the cerebral sulci. The  basal cisterns are clear. Mild hypoattenuation of the inferior  temporal lobe cortices, likely artifactual.     The bony calvaria and the bones of the skull base are normal. The  visualized portions of the paranasal sinuses and mastoid air cells are  clear.                                                                       Impression:  No acute intracranial pathology.      Objective    Vitals - Patient Reported  Pain Score: Mild Pain (2)  Pain Loc:  Head      Physical Exam   Patient is alert and no in apparent acute distress,  mentation appears normal, judgement and insight intact, normal speech, no weakness observed    I discussed all my recommendations with Kay Ceja who verbalizes understanding and comfortable with the plan.     24 minutes spent on the date of the encounter doing video access, chart  review,  results review,  meds review, treatment plan, documentation and further activities as noted above    KESHIA Landis, CNP [facility]  Headache certified  [facility]      Video-Visit Details    Type of service:  Video Visit   Originating Location (pt. Location): Home  Distant Location (provider location):  Off-site  Platform used for Video Visit: Bette

## 2024-04-04 NOTE — NURSING NOTE
Is the patient currently in the state of [place]? YES    Visit mode:VIDEO    If the visit is dropped, the patient can be reconnected by: TELEPHONE VISIT: Phone number:   Telephone Information:   Mobile [phone]       Will anyone else be joining the visit? NO  (If patient encounters technical issues they should call [phone]    How would you like to obtain your AVS? MyChart    Are changes needed to the allergy or medication list? Pt stated no med changes    Are refills needed on medications prescribed by this physician? NO    Reason for visit: Video Visit (Follow-up )    Kathleen WILSON

## 2024-04-04 NOTE — LETTER
4/4/2024       RE: Kay Ceja  [address]  [facility] [address]     Dear Colleague,    Thank you for referring your patient, Kay Ceja, to the [facility] NEUROLOGY CLINIC [place] at [facility]. Please see a copy of my visit note below.    Kay is a 53 year old who is being evaluated via a billable video visit.      Subjective  Kay is a 53 year old, presenting for the following health issues:  Video Visit (Follow-up )  Last Headache Clinic visit in Feb 2024, see note for details.   Changes since last visit.   Naratriptan+naproxen  took on Saturday -helped with the pain but it did not resolved swaying symptoms and cannot lay down on the floor. Hiking is very important and has a month in the summer will go hiking.   In the past 7 days migraine not associated with a significant migraine pain but more with a motion/balance symptoms. Lately getting worse. No other new neurological symptoms reported.   Does not want to use any psychotropic medications -wellbutrin, luvax, prozac, zolft tried in the past and did not like how they make her feel. Family history of bipolar and saw family members detrimental effect from psychotropic meds. Would avoid BBB with history of depression and borderline low blood pressure  Discussed a trial of topiramate for migraine prevention.     Headache treatment tried  Sumatriptan, naratriptan, naproxen     Impression   Chronic migraines  Possible vestibular migraines      Treatment Plan:  Migraine headache prevention-a trial of topiramate 25 mg at bedtime for one week, then 50 mg at bedtime for one week, then take 75 mg (3 tabs) at bedtime if tolerated . Side effects-stay hydrated and drink at least 10+glasses of water to decrease risk of kidney stones, may cause tingling in the hands and feet, taste changes, glaucoma, nausea, weight stable or loss, mood changes.    Rescue treatment -naratriptan and naproxen as needed    Ondasetron(zofran) as needed for nausea   Follow up in 3 months or sooner if needed     DATA reviewed  EXAM: MR BRAIN W/O & W CONTRAST  6/27/2023 3:15 PM      HISTORY: Balance problems        COMPARISON: Head CT 10/13/2021     TECHNIQUE: Sagittal T1-weighted, coronal T2-weighted, axial T2 FLAIR,  axial susceptibility weighted, and axial diffusion-weighted with ADC  map images of the brain were obtained without intravenous contrast.  After the administration of intravenous contrast axial and coronal  fat-suppressed T1-weighted weighted images were obtained     CONTRAST: 5.5mL Gadavist.     FINDINGS:  No intracranial hemorrhage or mass effect. No hydrocephalus. Patent  major intracranial vascular flow voids. No abnormal restricted  diffusion. No abnormal enhancement. Few punctate T2 hyperintense foci  in the cerebral hemispheric white matter, not disproportionate to age.  Normal orbits. Paranasal sinuses and mastoid air cells are clear.                                                                      IMPRESSION:  1. No MR finding to account for symptoms.  2. Few scattered lesions in the white matter of the cerebral  hemispheres, favoring sequela of chronic small vessel ischemic change.     CT HEAD W/O CONTRAST 10/13/2021 1:25 PM     History: Facial trauma   ICD-10:     Comparison: None     Technique: Using multidetector thin collimation helical acquisition  technique, axial, coronal and sagittal CT images from the skull base  to the vertex were obtained without intravenous contrast.   (topogram) image(s) also obtained and reviewed.     Findings: There is no intracranial hemorrhage, mass effect, or midline  shift. Gray/white matter differentiation in both cerebral hemispheres  is preserved. Ventricles are proportionate to the cerebral sulci. The  basal cisterns are clear. Mild hypoattenuation of the inferior  temporal lobe cortices, likely artifactual.     The bony calvaria and the bones of the skull base  are normal. The  visualized portions of the paranasal sinuses and mastoid air cells are  clear.                                                                       Impression:  No acute intracranial pathology.      Objective   Vitals - Patient Reported  Pain Score: Mild Pain (2)  Pain Loc: Head      Physical Exam   Patient is alert and no in apparent acute distress,  mentation appears normal, judgement and insight intact, normal speech, no weakness observed    I discussed all my recommendations with Kay Ceja who verbalizes understanding and comfortable with the plan.     24 minutes spent on the date of the encounter doing video access, chart  review,  results review,  meds review, treatment plan, documentation and further activities as noted above        Again, thank you for allowing me to participate in the care of your patient.      Sincerely,    KESHIA Davila CNP

## 2024-04-12 DIAGNOSIS — R42 VERTIGO: Primary | ICD-10-CM

## 2024-04-12 DIAGNOSIS — R26.89 BALANCE PROBLEMS: ICD-10-CM

## 2024-04-12 DIAGNOSIS — G43.709 CHRONIC MIGRAINE WITHOUT AURA WITHOUT STATUS MIGRAINOSUS, NOT INTRACTABLE: ICD-10-CM

## 2024-04-30 ENCOUNTER — THERAPY VISIT (OUTPATIENT)
Dept: PHYSICAL THERAPY | Facility: CLINIC | Age: 54
End: 2024-04-30
Attending: NURSE PRACTITIONER
Payer: COMMERCIAL

## 2024-04-30 DIAGNOSIS — G43.709 CHRONIC MIGRAINE WITHOUT AURA WITHOUT STATUS MIGRAINOSUS, NOT INTRACTABLE: ICD-10-CM

## 2024-04-30 DIAGNOSIS — R42 VERTIGO: ICD-10-CM

## 2024-04-30 DIAGNOSIS — R26.89 BALANCE PROBLEMS: ICD-10-CM

## 2024-04-30 PROCEDURE — 97162 PT EVAL MOD COMPLEX 30 MIN: CPT | Mod: GP | Performed by: PHYSICAL THERAPIST

## 2024-04-30 PROCEDURE — 97112 NEUROMUSCULAR REEDUCATION: CPT | Mod: GP | Performed by: PHYSICAL THERAPIST

## 2024-04-30 PROCEDURE — 97535 SELF CARE MNGMENT TRAINING: CPT | Mod: GP | Performed by: PHYSICAL THERAPIST

## 2024-04-30 NOTE — PATIENT INSTRUCTIONS
There can be many triggers for migraines, and it can be very helpful to keep a journal to track symptoms and potential triggers. Over time, as you get to know your own triggers, you will be better able to prevent migraines from occurring. Common migraine triggers include:    Diet   Neck Pain  Posture  Stress  Sleep  Vision  Environment--aromas, lighting, noise  Vestibular       To help manage symptoms:    Aerobic exercise: 20-30 minutes of light-moderate intensity aerobic exercise most days of the week (walking is great!)  Hydration: drink your body weight in ounces of water per day  Diet: Eat on a regular schedule and don't let yourself get too hungry. Stick to whole foods, limit processed foods and excess sugar  Sleep: Stick to a regular sleep schedule--going to bed at the same time every night (regardless of the day) and waking up at the same time

## 2024-04-30 NOTE — PROGRESS NOTES
PHYSICAL THERAPY EVALUATION  Type of Visit: Evaluation    See electronic medical record for Abuse and Falls Screening details.    Subjective       Presenting condition or subjective complaint: feeling of motion, swaying when still  She feels like she has had subtle balance/dizziness concerns for a number of years.  At times it feels like the world is moving. She has h/o migraines and has had an increase in frequency of migraines more recently. She tends to get migraines in clusters.  She has identified heavy fragrances as trigger, but is uncertain of other things. She does report a h/o motion sickness.    Date of onset: 04/12/24    Relevant medical history: Dizziness; History of fractures; Menopause; Migraines or headaches; Osteoporosis   Dates & types of surgery: Mohs    Prior diagnostic imaging/testing results: MRI     Prior therapy history for the same diagnosis, illness or injury: No      Prior Level of Function  Transfers: Independent  Ambulation: Independent  ADL: Independent  IADL: Driving, Work    Living Environment  Social support: With a significant other or spouse   Type of home: House   Stairs to enter the home: Yes 4 Is there a railing: Yes   Ramp: No   Stairs inside the home: Yes 8 Is there a railing: Yes   Help at home: None  Equipment owned:       Employment: Yes psychotherapist  Hobbies/Interests: backpacking, gardening, cycling    Patient goals for therapy: activities of motion, concentration; it's fatiguing    Pain assessment: Pain denied--reports some shoulder tension     Objective      Cognitive Status Examination  Orientation: Oriented to person, place and time   Level of Consciousness: Alert  Follows Commands and Answers Questions: 100% of the time  Personal Safety and Judgement: Intact  Memory: Intact    OBSERVATION: Patient is pleasant/cooperative. Arrives to appointment independently.   INTEGUMENTARY: Intact  POSTURE: WNL  PALPATION: N/T  RANGE OF MOTION: LE ROM WNL  UE ROM WNL  STRENGTH:  LE Strength WNL  UE Strength WNL    BED MOBILITY: Independent    TRANSFERS: Independent    WHEELCHAIR MOBILITY: N/A    GAIT:   Level of Vincent: WNL  Assistive Device(s): None  Gait Deviations: WNL  Gait Distance: Able to ambulate functional community distances independently  Stairs: Independent    BALANCE:  Score of 25/30 on the FGA does not indicate a fall risk, but is below age-matched norms.    SPECIAL TESTS  Functional Gait Assessment (FGA) TOTAL SCORE: (MAXIMUM SCORE 30): 25      Modified CTSIB Conditions (sec) Cond 1:  30  Cond 2:  30  Cond 3:  30  Cond 4 :  30--increased sway and report of increased sx       SENSATION: UE Sensation WNL, LE Sensation WNL    REFLEXES: WNL  COORDINATION: WNL  MUSCLE TONE: WNL       VESTIBULAR EVALUATION  ADDITIONAL HISTORY:  Description of symptoms: Constant dizziness; Off balance; Light-headedness  Dizzy attacks:   Start: chronic but worsened in past month along with migraines   Last attack: about 3 weeks ago and has not fully resolved   Frequency of occurrences: several times a week it worsens but s/s are present to some level daily   Length of attack: persist until I use migraine medicine which I am needing to refrain from  Difficulty hearing:    Noise in ears? Yes white noise  Alleviates symptoms: sumatriptan or naratriptan, sometimes rest  Worsens symptoms: needing to sit in somewhat fixed position and focus at work all day  Activities that bring on symptoms: Walking up or down stairs; Unstable surfaces; Walking in the dark       Pertinent visual history: None  Pertinent history of current vestibular problem: Migraines, Motion sickness  DHI: Total Score: 26    Cervicogenic Screen    Neck ROM Normal     Oculomotor Screen    Ocular ROM Normal   Smooth Pursuit Normal   Saccades Normal   VOR Abnormal vertical   VOR Cancellation Normal   Head Impulse Test Normal   Convergence Testing Normal        Infrared Goggle Exam Vestibular Suppressant in Last 24 Hours? No  Exam  Completed With: Infrared goggles   Spontaneous Nystagmus Negative   Gaze Evoked Nystagmus Negative    Left Right   Kopperl-Hallpike Negative Negative   HSCC Supine Roll Test Negative Negative          Dynamic Visual Acuity (DVA)    Static Acuity (LogMar) .1   Horizontal Head Movement at 1 Hz (LogMar)    Horizontal Head Movement at 2 Hz (LogMar) .2          Assessment & Plan   CLINICAL IMPRESSIONS  Medical Diagnosis: Vertigo; Chronic Migraine without aura without status migrainosus, not intractable; balance problems    Treatment Diagnosis: Dizziness   Impression/Assessment: Patient is a 53 year old female with complaints of dizziness and unsteadiness.  The following significant findings have been identified: Impaired balance, Instability, and Dizziness. These impairments interfere with their ability to perform recreational activities and community mobility  as compared to previous level of function. Path deviations while walking with head turns or eyes closed, and increased sway on condition 4 of the mCTSIB indicate difficulty using the vestibular system for balance. No clear indication of a peripheral vestibular deficit on exam today. History of frequent migraines, motion sickness, and intermittent symptoms of dizziness, may be consistent with vestibular migraines. Further VRT is indicated to improve symptoms so that she may return to her PLOF.    Clinical Decision Making (Complexity):  Clinical Presentation: Evolving/Changing  Clinical Presentation Rationale: based on medical and personal factors listed in PT evaluation  Clinical Decision Making (Complexity): Moderate complexity    PLAN OF CARE  Treatment Interventions:  Interventions: Gait Training, Manual Therapy, Neuromuscular Re-education, Therapeutic Activity, Therapeutic Exercise, Self-Care/Home Management    Long Term Goals     PT Goal 1  Goal Identifier: HEP  Goal Description: Patient will be independent with Home Exercise Program for continued improvements in  health and wellness upon d/c from therapy  Target Date: 06/24/24  PT Goal 2  Goal Identifier: FGA  Goal Description: Patient will improve score on FGA to 30/30 to indicate improved safety with community mobility  Target Date: 06/24/24  PT Goal 3  Goal Identifier: DHI  Goal Description: Patient will improve score on DHI to 8 or better as an indication of improved subjective report of symptoms for return to PLOF  Target Date: 06/24/24      Frequency of Treatment: 1x/week  Duration of Treatment: up to 8 weeks    Recommended Referrals to Other Professionals:  None at this time  Education Assessment:   Learner/Method: Patient  Education Comments: Educated on POC, HEP    Risks and benefits of evaluation/treatment have been explained.   Patient/Family/caregiver agrees with Plan of Care.     Evaluation Time:     PT Eval, Moderate Complexity Minutes (31468): 20       Signing Clinician: Anne Johnson, PT

## 2024-05-02 DIAGNOSIS — R42 DIZZINESS: ICD-10-CM

## 2024-05-02 DIAGNOSIS — R42 VERTIGO: Primary | ICD-10-CM

## 2024-05-06 ENCOUNTER — TELEPHONE (OUTPATIENT)
Dept: NEUROLOGY | Facility: CLINIC | Age: 54
End: 2024-05-06
Payer: COMMERCIAL

## 2024-05-06 DIAGNOSIS — G43.709 CHRONIC MIGRAINE WITHOUT AURA WITHOUT STATUS MIGRAINOSUS, NOT INTRACTABLE: Primary | ICD-10-CM

## 2024-05-06 RX ORDER — FREMANEZUMAB-VFRM 225 MG/1.5ML
225 INJECTION SUBCUTANEOUS
Qty: 1.5 ML | Refills: 11 | Status: SHIPPED | OUTPATIENT
Start: 2024-05-06 | End: 2024-05-17

## 2024-05-06 NOTE — TELEPHONE ENCOUNTER
RECORDS RECEIVED FROM: Internal    REASON FOR VISIT: Vertigo, Dizziness   PROVIDER: Doris Rose MD   DATE OF APPT: 7/10/24 @ 4:30 pm    NOTES (FOR ALL VISITS) STATUS DETAILS   OFFICE NOTE from referring provider Internal 5/2/24 (Orders Only), 4/4/24 Laurel Napoles APRN CNP @[facility]-Neuro     MEDICATION LIST Internal    IMAGING  (FOR ALL VISITS)     MRI (HEAD, NECK, SPINE) Internal [facility]  6/27/23 MR Brain

## 2024-05-06 NOTE — TELEPHONE ENCOUNTER
Prior Authorization Retail Medication Request    Medication/Dose: Ajovy 225 mg every 30 days  Diagnosis and ICD code (if different than what is on RX):    New/renewal/insurance change PA/secondary ins. PA:  Previously Tried and Failed:  Sumatriptan, naratriptan, naproxen..-wellbutrin, luvax, prozac, zolft tried in the past and did not like how they make her feel. Family history of bipolar and saw family members detrimental effect from psychotropic meds. Would avoid BBB with history of depression and borderline low blood pressure  Discussed a trial of topiramate for migraine prevention.     Rationale:  migraine    Insurance   Primary: [employer]  Insurance ID:  [id number]

## 2024-05-07 ENCOUNTER — THERAPY VISIT (OUTPATIENT)
Dept: PHYSICAL THERAPY | Facility: CLINIC | Age: 54
End: 2024-05-07
Payer: COMMERCIAL

## 2024-05-07 DIAGNOSIS — G43.709 CHRONIC MIGRAINE WITHOUT AURA WITHOUT STATUS MIGRAINOSUS, NOT INTRACTABLE: ICD-10-CM

## 2024-05-07 DIAGNOSIS — R26.89 BALANCE PROBLEMS: ICD-10-CM

## 2024-05-07 DIAGNOSIS — R42 VERTIGO: Primary | ICD-10-CM

## 2024-05-07 PROCEDURE — 97140 MANUAL THERAPY 1/> REGIONS: CPT | Mod: GP | Performed by: PHYSICAL THERAPIST

## 2024-05-07 PROCEDURE — 97750 PHYSICAL PERFORMANCE TEST: CPT | Mod: GP | Performed by: PHYSICAL THERAPIST

## 2024-05-07 PROCEDURE — 97535 SELF CARE MNGMENT TRAINING: CPT | Mod: GP | Performed by: PHYSICAL THERAPIST

## 2024-05-07 NOTE — PROGRESS NOTES
Computerized Dynamic Posturography (CDP)     Background Information: Kay Ceja  was seen today for a Computerized Dynamic Posturography (CDP) evaluation. Patient reports symptoms of dizziness/swaying   Medical history of   Past Medical History:   Diagnosis Date    Anxiety     Basal cell carcinoma     Insomnia     Migraines     Osteopenia     T score -1.9 hip and back 1/19/11    Vasomotor rhinitis     uses nasalcort         Test Results and Procedures:     Sensory Organization Test:   Overall composite equilibrium score was 81.5.  Age matched normative value is above 70 .  Composite sensory scores   Somatosensory: 97 (age matched norms: 90).   Vision: 84 (age matched norms: 74).   Vestibular: 77 (age matched norms: 55).   Visual Preference: 100 (age matched norms: 86).  Patient [facility] [place] was shifted to the right .  Patient's main balance strategy used in testing was ankle.  Falls were recorded on conditions none.  Imbalance demonstrated on conditions 4 (1 trial).  Symptoms reported on conditions --.    Summary and Recommendations:   Patient demonstrated balance WNL, with good use of all sensory systems and no significant increase in symptoms on any condition.     Minutes billed as Physical Performance Test/Measure: 12

## 2024-05-14 ENCOUNTER — THERAPY VISIT (OUTPATIENT)
Dept: PHYSICAL THERAPY | Facility: CLINIC | Age: 54
End: 2024-05-14
Attending: NURSE PRACTITIONER
Payer: COMMERCIAL

## 2024-05-14 DIAGNOSIS — R26.89 BALANCE PROBLEMS: ICD-10-CM

## 2024-05-14 DIAGNOSIS — R42 VERTIGO: Primary | ICD-10-CM

## 2024-05-14 DIAGNOSIS — G43.709 CHRONIC MIGRAINE WITHOUT AURA WITHOUT STATUS MIGRAINOSUS, NOT INTRACTABLE: ICD-10-CM

## 2024-05-14 PROCEDURE — 97140 MANUAL THERAPY 1/> REGIONS: CPT | Mod: GP | Performed by: PHYSICAL THERAPIST

## 2024-05-14 PROCEDURE — 97112 NEUROMUSCULAR REEDUCATION: CPT | Mod: GP | Performed by: PHYSICAL THERAPIST

## 2024-05-14 NOTE — PATIENT INSTRUCTIONS
Craniocradle    [email or web address]

## 2024-05-17 NOTE — TELEPHONE ENCOUNTER
Prior Authorization Approval    Authorization Effective Date: 4/17/2024  Authorization Expiration Date: 11/17/2024  Medication: Ajovy 225 mg every 30 days-APPROVED  Reference #:     Insurance Company: [employer] - Phone [phone] Fax [phone]  Which Pharmacy is filling the prescription (Not needed for infusion/clinic administered): [facility] - [place], [place] - [address] [place] [address] [address]  Pharmacy Notified: Yes  Patient Notified: Instructed pharmacy to notify patient when script is ready to /ship.    Pharmacy has to order in medication for Monday.

## 2024-06-11 ENCOUNTER — THERAPY VISIT (OUTPATIENT)
Dept: PHYSICAL THERAPY | Facility: CLINIC | Age: 54
End: 2024-06-11
Payer: COMMERCIAL

## 2024-06-11 DIAGNOSIS — G43.709 CHRONIC MIGRAINE WITHOUT AURA WITHOUT STATUS MIGRAINOSUS, NOT INTRACTABLE: ICD-10-CM

## 2024-06-11 DIAGNOSIS — R26.89 BALANCE PROBLEMS: ICD-10-CM

## 2024-06-11 DIAGNOSIS — R42 VERTIGO: Primary | ICD-10-CM

## 2024-06-11 PROCEDURE — 97112 NEUROMUSCULAR REEDUCATION: CPT | Mod: GP | Performed by: PHYSICAL THERAPIST

## 2024-06-17 ENCOUNTER — ANCILLARY PROCEDURE (OUTPATIENT)
Dept: MAMMOGRAPHY | Facility: CLINIC | Age: 54
End: 2024-06-17
Attending: OBSTETRICS & GYNECOLOGY
Payer: COMMERCIAL

## 2024-06-17 DIAGNOSIS — Z12.31 VISIT FOR SCREENING MAMMOGRAM: ICD-10-CM

## 2024-06-17 PROCEDURE — 77063 BREAST TOMOSYNTHESIS BI: CPT

## 2024-06-17 PROCEDURE — 77067 SCR MAMMO BI INCL CAD: CPT | Mod: 26 | Performed by: RADIOLOGY

## 2024-06-17 PROCEDURE — 77063 BREAST TOMOSYNTHESIS BI: CPT | Mod: 26 | Performed by: RADIOLOGY

## 2024-07-01 ENCOUNTER — TELEPHONE (OUTPATIENT)
Dept: DERMATOLOGY | Facility: CLINIC | Age: 54
End: 2024-07-01
Payer: COMMERCIAL

## 2024-07-01 ASSESSMENT — MIGRAINE DISABILITY ASSESSMENT (MIDAS)
HOW MANY DAYS DID YOU NOT DO HOUSEWORK BECAUSE OF HEADACHES: 10
ON A SCALE FROM 0-10 ON AVERAGE HOW PAINFUL WERE HEADACHES: 5
TOTAL SCORE: 43
HOW OFTEN WERE SOCIAL ACTIVITIES MISSED DUE TO HEADACHES: 10
HOW MANY DAYS DID YOU MISS WORK OR SCHOOL BECAUSE OF HEADACHES: 7
HOW MANY DAYS WAS HOUSEWORK PRODUCTIVITY CUT IN HALF DUE TO HEADACHES: 10
HOW MANY DAYS IN THE PAST 3 MONTHS HAVE YOU HAD A HEADACHE: 18
HOW MANY DAYS WAS YOUR PRODUCTIVITY CUT IN HALF BECAUSE OF HEADACHES: 6

## 2024-07-01 ASSESSMENT — HEADACHE IMPACT TEST (HIT 6)
HOW OFTEN HAVE YOU FELT FED UP OR IRRITATED BECAUSE OF YOUR HEADACHES: SOMETIMES
HIT6 TOTAL SCORE: 62
HOW OFTEN DID HEADACHS LIMIT CONCENTRATION ON WORK OR DAILY ACTIVITY: SOMETIMES
HOW OFTEN HAVE YOU FELT TOO TIRED TO WORK BECAUSE OF YOUR HEADACHES: SOMETIMES
WHEN YOU HAVE HEADACHES HOW OFTEN IS THE PAIN SEVERE: VERY OFTEN
WHEN YOU HAVE A HEADACHE HOW OFTEN DO YOU WISH YOU COULD LIE DOWN: VERY OFTEN
HOW OFTEN DO HEADACHES LIMIT YOUR DAILY ACTIVITIES: SOMETIMES

## 2024-07-01 NOTE — TELEPHONE ENCOUNTER
Left Voicemail (1st Attempt) and Sent Mychart (1st Attempt) for the patient to call back and schedule the following:        Appointment type: Return   Provider: Dr. Randall  Return date: 9/11/24  Specialty phone number: [phone]

## 2024-07-05 ENCOUNTER — VIRTUAL VISIT (OUTPATIENT)
Dept: NEUROLOGY | Facility: CLINIC | Age: 54
End: 2024-07-05
Payer: COMMERCIAL

## 2024-07-05 VITALS — WEIGHT: 105 LBS | BODY MASS INDEX: 17.93 KG/M2 | HEIGHT: 64 IN

## 2024-07-05 DIAGNOSIS — R42 DIZZINESS: Primary | ICD-10-CM

## 2024-07-05 DIAGNOSIS — G43.709 CHRONIC MIGRAINE WITHOUT AURA WITHOUT STATUS MIGRAINOSUS, NOT INTRACTABLE: ICD-10-CM

## 2024-07-05 PROCEDURE — 99212 OFFICE O/P EST SF 10 MIN: CPT | Mod: 95 | Performed by: NURSE PRACTITIONER

## 2024-07-05 ASSESSMENT — PAIN SCALES - GENERAL: PAINLEVEL: NO PAIN (1)

## 2024-07-05 NOTE — NURSING NOTE
Current patient location: [address] [place], [place] [address] [address]    Is the patient currently in the state of [place]? YES    Visit mode:VIDEO    If the visit is dropped, the patient can be reconnected by: VIDEO VISIT: Send to e-mail at: [email or web address]    Will anyone else be joining the visit? NO  (If patient encounters technical issues they should call [phone]    How would you like to obtain your AVS? MyChart    Are changes needed to the allergy or medication list? Pt stated no changes to allergies and Pt stated no med changes    Are refills needed on medications prescribed by this physician? NO    Reason for visit: ADILENE KIRKF

## 2024-07-05 NOTE — PROGRESS NOTES
"Virtual Visit Details    Type of service:  Video Visit   Originating Location (pt. Location): Home  Distant Location (provider location):  Off-site  Platform used for Video Visit: [facility]    Headache Neurology Progress Note  July 5, 2024      Assessment/Plan:   Kay Ceja is a 53 year old   Chronic migraines stable with starting treatment with Ajovy. No new concerns reported today  dizziness    Plan to continue with the same Tx plan   Follow up in 6 months or sooner if needed     18 minutes spent on the date of the encounter doing video access, chart  review,  results review,  meds review, treatment plan, documentation and further activities as noted above    KESHIA Landis, CNP [facility]  Headache certified  [facility]      Subjective:    Kay Ceja returns for follow up of headaches/dizziness    MIDAS before Ajovy injections  Have not had  migraine but one -5 days after first Ajovy injection than none headaches after 2-month of starting of Ajovy.   Smell is a primary trigger and had a hand saniter that would trigger migraine and felt it will activate but it did not happen  Dizziness still persistent and seeing Dr Rose on 7/10  Questions -plans to leave [facility] in 4/25 and will be buying insurance plan to cover Ajovy. Discussed options.   Discussed side effects -injections site reaction.     Objective:   Vitals: Ht 1.626 m (5' 4\")   Wt 47.6 kg (105 lb)   BMI 18.02 kg/m    General: Cooperative, NAD  Neurologic:  Mental Status: Fully alert, attentive and oriented. Speech clear and fluent.   Cranial Nerves: Facial movements symmetric.   Motor: No abnormal movements.      Pertinent Investigations:            2/7/2024    12:59 PM 4/3/2024     1:14 PM 7/1/2024     4:07 PM   HIT-6   When you have headaches, how often is the pain severe 10 10 11   How often do headaches limit your ability to do usual daily activities including household work, work, school, or " social activities? 10 10 10   When you have a headache, how often do you wish you could lie down? 11 11 11   In the past 4 weeks, how often have you felt too tired to do work or daily activities because of your headaches 10 11 10   In the past 4 weeks, how often have you felt fed up or irritated because of your headaches 10 11 10   In the past 4 weeks, how often did headaches limit your ability to concentrate on work or daily activities 10 11 10   HIT-6 Total Score 61 64 62           2/7/2024     1:04 PM 4/3/2024     1:19 PM 7/1/2024     4:12 PM   MIDAS - in the past three months:   On how many days did you miss work or school because of your headaches? 2 3 7   How many days was your productivity at work or school reduced by half or more because of your headaches? 5 5 6   On how many days did you not do household work because of your headaches? 8 5 10   How many days was your productivity in household work reduced by half or more because of your headaches? 0 12 10   On how many days did you miss family, social, or leisure activities because of your headaches? 5 6 10   On how many days did you have a headache? 34 37 18   On a scale of 0-10, on average how painful were these headaches? 4 6 5   MIDAS Score 20 (III - Moderate Disability) 31 (IV - Severe Disability) 43 (IV - Severe Disability)

## 2024-07-05 NOTE — LETTER
"7/5/2024       RE: Kay Ceja  [address]  [facility] [address]     Dear Colleague,    Thank you for referring your patient, Kay Ceja, to the [facility] [place] at [facility]. Please see a copy of my visit note below.    Virtual Visit Details    Type of service:  Video Visit   Originating Location (pt. Location): Home  Distant Location (provider location):  Off-site  Platform used for Video Visit: [facility]    Headache Neurology Progress Note  July 5, 2024      Assessment/Plan:   Kay Ceja is a 53 year old   Chronic migraines stable with starting treatment with Ajovy. No new concerns reported today  dizziness    Plan to continue with the same Tx plan   Follow up in 6 months or sooner if needed     18 minutes spent on the date of the encounter doing video access, chart  review,  results review,  meds review, treatment plan, documentation and further activities as noted above    KESHIA Landis, CNP [facility]  Headache certified  [facility]      Subjective:    Kay Ceja returns for follow up of headaches/dizziness    MIDAS before Ajovy injections  Have not had  migraine but one -5 days after first Ajovy injection than none headaches after 2-month of starting of Ajovy.   Smell is a primary trigger and had a hand saniter that would trigger migraine and felt it will activate but it did not happen  Dizziness still persistent and seeing Dr Rose on 7/10  Questions -plans to leave [place] in 4/25 and will be buying insurance plan to cover Ajovy. Discussed options.   Discussed side effects -injections site reaction.     Objective:   Vitals: Ht 1.626 m (5' 4\")   Wt 47.6 kg (105 lb)   BMI 18.02 kg/m    General: Cooperative, NAD  Neurologic:  Mental Status: Fully alert, attentive and oriented. Speech clear and fluent.   Cranial Nerves: Facial movements symmetric.   Motor: No abnormal " movements.      Pertinent Investigations:            2/7/2024    12:59 PM 4/3/2024     1:14 PM 7/1/2024     4:07 PM   HIT-6   When you have headaches, how often is the pain severe 10 10 11   How often do headaches limit your ability to do usual daily activities including household work, work, school, or social activities? 10 10 10   When you have a headache, how often do you wish you could lie down? 11 11 11   In the past 4 weeks, how often have you felt too tired to do work or daily activities because of your headaches 10 11 10   In the past 4 weeks, how often have you felt fed up or irritated because of your headaches 10 11 10   In the past 4 weeks, how often did headaches limit your ability to concentrate on work or daily activities 10 11 10   HIT-6 Total Score 61 64 62           2/7/2024     1:04 PM 4/3/2024     1:19 PM 7/1/2024     4:12 PM   MIDAS - in the past three months:   On how many days did you miss work or school because of your headaches? 2 3 7   How many days was your productivity at work or school reduced by half or more because of your headaches? 5 5 6   On how many days did you not do household work because of your headaches? 8 5 10   How many days was your productivity in household work reduced by half or more because of your headaches? 0 12 10   On how many days did you miss family, social, or leisure activities because of your headaches? 5 6 10   On how many days did you have a headache? 34 37 18   On a scale of 0-10, on average how painful were these headaches? 4 6 5   MIDAS Score 20 (III - Moderate Disability) 31 (IV - Severe Disability) 43 (IV - Severe Disability)        Again, thank you for allowing me to participate in the care of your patient.      Sincerely,    KESHIA Davila CNP

## 2024-07-10 ENCOUNTER — TELEPHONE (OUTPATIENT)
Dept: DERMATOLOGY | Facility: CLINIC | Age: 54
End: 2024-07-10

## 2024-07-10 ENCOUNTER — PRE VISIT (OUTPATIENT)
Dept: NEUROLOGY | Facility: CLINIC | Age: 54
End: 2024-07-10

## 2024-07-15 ENCOUNTER — TELEPHONE (OUTPATIENT)
Dept: NEUROLOGY | Facility: CLINIC | Age: 54
End: 2024-07-15
Payer: COMMERCIAL

## 2024-07-15 NOTE — TELEPHONE ENCOUNTER
Left Voicemail (1st Attempt) and Sent Mychart (1st Attempt) for the patient to call back and schedule the following:    Appointment type: Return Headache  Provider: Laurel Napoles  Return date: around 1/5/2025   Specialty phone number: [phone]  Additional appointment(s) needed:   Additonal Notes: 6 month follow up    Cele Rich on 7/15/2024 at 4:47 PM

## 2024-07-19 ENCOUNTER — TRANSFERRED RECORDS (OUTPATIENT)
Dept: HEALTH INFORMATION MANAGEMENT | Facility: CLINIC | Age: 54
End: 2024-07-19

## 2024-07-23 ENCOUNTER — TRANSFERRED RECORDS (OUTPATIENT)
Dept: HEALTH INFORMATION MANAGEMENT | Facility: CLINIC | Age: 54
End: 2024-07-23
Payer: COMMERCIAL

## 2024-07-27 ENCOUNTER — OFFICE VISIT (OUTPATIENT)
Dept: INTERNAL MEDICINE | Facility: CLINIC | Age: 54
End: 2024-07-27
Payer: COMMERCIAL

## 2024-07-27 VITALS
HEART RATE: 79 BPM | BODY MASS INDEX: 17.93 KG/M2 | DIASTOLIC BLOOD PRESSURE: 74 MMHG | HEIGHT: 64 IN | OXYGEN SATURATION: 99 % | SYSTOLIC BLOOD PRESSURE: 106 MMHG | WEIGHT: 105 LBS

## 2024-07-27 DIAGNOSIS — Z48.02 VISIT FOR SUTURE REMOVAL: Primary | ICD-10-CM

## 2024-07-27 PROCEDURE — 99213 OFFICE O/P EST LOW 20 MIN: CPT | Performed by: INTERNAL MEDICINE

## 2024-07-27 ASSESSMENT — PAIN SCALES - GENERAL: PAINLEVEL: NO PAIN (0)
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
## 2024-07-27 NOTE — NURSING NOTE
Chief Complaint   Patient presents with    RECHECK     Here for suture removal.        Kat Gandara CMA, EMT at 11:29 AM on 7/27/2024.
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
## 2024-08-21 ENCOUNTER — OFFICE VISIT (OUTPATIENT)
Dept: NEUROLOGY | Facility: CLINIC | Age: 54
End: 2024-08-21
Payer: COMMERCIAL

## 2024-08-21 VITALS
OXYGEN SATURATION: 99 % | HEIGHT: 64 IN | RESPIRATION RATE: 16 BRPM | HEART RATE: 60 BPM | BODY MASS INDEX: 18.3 KG/M2 | DIASTOLIC BLOOD PRESSURE: 70 MMHG | SYSTOLIC BLOOD PRESSURE: 106 MMHG | WEIGHT: 107.2 LBS

## 2024-08-21 DIAGNOSIS — R42 DIZZINESS: ICD-10-CM

## 2024-08-21 DIAGNOSIS — G24.3 CERVICAL DYSTONIA: Primary | ICD-10-CM

## 2024-08-21 DIAGNOSIS — M54.2 NECK PAIN: ICD-10-CM

## 2024-08-21 DIAGNOSIS — S01.81XA LACERATION OF FOREHEAD, INITIAL ENCOUNTER: ICD-10-CM

## 2024-08-21 DIAGNOSIS — G43.009 MIGRAINE WITHOUT AURA AND WITHOUT STATUS MIGRAINOSUS, NOT INTRACTABLE: ICD-10-CM

## 2024-08-21 DIAGNOSIS — I87.1 COMPRESSION OF VEIN: ICD-10-CM

## 2024-08-21 DIAGNOSIS — G54.0 TOS (THORACIC OUTLET SYNDROME): ICD-10-CM

## 2024-08-21 PROCEDURE — 99417 PROLNG OP E/M EACH 15 MIN: CPT | Performed by: PSYCHIATRY & NEUROLOGY

## 2024-08-21 PROCEDURE — G2211 COMPLEX E/M VISIT ADD ON: HCPCS | Performed by: PSYCHIATRY & NEUROLOGY

## 2024-08-21 PROCEDURE — 99215 OFFICE O/P EST HI 40 MIN: CPT | Performed by: PSYCHIATRY & NEUROLOGY

## 2024-08-21 ASSESSMENT — PAIN SCALES - GENERAL: PAINLEVEL: NO PAIN (0)

## 2024-08-21 NOTE — PATIENT INSTRUCTIONS
TOS Vascular US  CTV Head  Follow-up will be determined by results of imaging, but will put in an appointment for 6mo for now

## 2024-08-21 NOTE — NURSING NOTE
"Chief Complaint   Patient presents with    New Patient     /70 (BP Location: Right arm, Patient Position: Sitting, Cuff Size: Adult Small)   Pulse 60   Resp 16   Ht 1.626 m (5' 4\")   Wt 48.6 kg (107 lb 3.2 oz)   SpO2 99%   BMI 18.40 kg/m      JEFF ROBLES    "
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
## 2024-08-22 ENCOUNTER — LAB (OUTPATIENT)
Dept: LAB | Facility: CLINIC | Age: 54
End: 2024-08-22
Payer: COMMERCIAL

## 2024-08-22 ENCOUNTER — OFFICE VISIT (OUTPATIENT)
Dept: INTERNAL MEDICINE | Facility: CLINIC | Age: 54
End: 2024-08-22
Payer: COMMERCIAL

## 2024-08-22 ENCOUNTER — ANCILLARY PROCEDURE (OUTPATIENT)
Dept: GENERAL RADIOLOGY | Facility: CLINIC | Age: 54
End: 2024-08-22
Attending: INTERNAL MEDICINE
Payer: COMMERCIAL

## 2024-08-22 VITALS
SYSTOLIC BLOOD PRESSURE: 107 MMHG | HEART RATE: 68 BPM | DIASTOLIC BLOOD PRESSURE: 73 MMHG | WEIGHT: 107.3 LBS | BODY MASS INDEX: 18.42 KG/M2 | OXYGEN SATURATION: 100 %

## 2024-08-22 DIAGNOSIS — M85.80 OSTEOPENIA, UNSPECIFIED LOCATION: ICD-10-CM

## 2024-08-22 DIAGNOSIS — Z00.00 ENCOUNTER FOR ROUTINE ADULT HEALTH EXAMINATION WITHOUT ABNORMAL FINDINGS: ICD-10-CM

## 2024-08-22 DIAGNOSIS — M25.531 RIGHT WRIST PAIN: Primary | ICD-10-CM

## 2024-08-22 DIAGNOSIS — M25.531 RIGHT WRIST PAIN: ICD-10-CM

## 2024-08-22 LAB
ALBUMIN SERPL BCG-MCNC: 4.4 G/DL (ref 3.5–5.2)
ALP SERPL-CCNC: 57 U/L (ref 40–150)
ALT SERPL W P-5'-P-CCNC: 24 U/L (ref 0–50)
ANION GAP SERPL CALCULATED.3IONS-SCNC: 10 MMOL/L (ref 7–15)
AST SERPL W P-5'-P-CCNC: 29 U/L (ref 0–45)
BASOPHILS # BLD AUTO: 0 10E3/UL (ref 0–0.2)
BASOPHILS NFR BLD AUTO: 1 %
BILIRUB SERPL-MCNC: 0.7 MG/DL
BUN SERPL-MCNC: 13.7 MG/DL (ref 6–20)
CALCIUM SERPL-MCNC: 9.7 MG/DL (ref 8.8–10.4)
CHLORIDE SERPL-SCNC: 103 MMOL/L (ref 98–107)
CHOLEST SERPL-MCNC: 160 MG/DL
CREAT SERPL-MCNC: 0.71 MG/DL (ref 0.51–0.95)
EGFRCR SERPLBLD CKD-EPI 2021: >90 ML/MIN/1.73M2
EOSINOPHIL # BLD AUTO: 0.1 10E3/UL (ref 0–0.7)
EOSINOPHIL NFR BLD AUTO: 1 %
ERYTHROCYTE [DISTWIDTH] IN BLOOD BY AUTOMATED COUNT: 13.2 % (ref 10–15)
FASTING STATUS PATIENT QL REPORTED: NORMAL
FASTING STATUS PATIENT QL REPORTED: NORMAL
GLUCOSE SERPL-MCNC: 86 MG/DL (ref 70–99)
HCO3 SERPL-SCNC: 26 MMOL/L (ref 22–29)
HCT VFR BLD AUTO: 38 % (ref 35–47)
HDLC SERPL-MCNC: 68 MG/DL
HGB BLD-MCNC: 12.6 G/DL (ref 11.7–15.7)
IMM GRANULOCYTES # BLD: 0 10E3/UL
IMM GRANULOCYTES NFR BLD: 0 %
LDLC SERPL CALC-MCNC: 81 MG/DL
LYMPHOCYTES # BLD AUTO: 1.6 10E3/UL (ref 0.8–5.3)
LYMPHOCYTES NFR BLD AUTO: 23 %
MCH RBC QN AUTO: 30.5 PG (ref 26.5–33)
MCHC RBC AUTO-ENTMCNC: 33.2 G/DL (ref 31.5–36.5)
MCV RBC AUTO: 92 FL (ref 78–100)
MONOCYTES # BLD AUTO: 0.4 10E3/UL (ref 0–1.3)
MONOCYTES NFR BLD AUTO: 6 %
NEUTROPHILS # BLD AUTO: 4.7 10E3/UL (ref 1.6–8.3)
NEUTROPHILS NFR BLD AUTO: 69 %
NONHDLC SERPL-MCNC: 92 MG/DL
NRBC # BLD AUTO: 0 10E3/UL
NRBC BLD AUTO-RTO: 0 /100
PLATELET # BLD AUTO: 276 10E3/UL (ref 150–450)
POTASSIUM SERPL-SCNC: 3.6 MMOL/L (ref 3.4–5.3)
PROT SERPL-MCNC: 6.9 G/DL (ref 6.4–8.3)
PTH-INTACT SERPL-MCNC: 36 PG/ML (ref 15–65)
RBC # BLD AUTO: 4.13 10E6/UL (ref 3.8–5.2)
SODIUM SERPL-SCNC: 139 MMOL/L (ref 135–145)
TRIGL SERPL-MCNC: 57 MG/DL
TSH SERPL DL<=0.005 MIU/L-ACNC: 2.19 UIU/ML (ref 0.3–4.2)
VIT D+METAB SERPL-MCNC: 43 NG/ML (ref 20–50)
WBC # BLD AUTO: 6.8 10E3/UL (ref 4–11)

## 2024-08-22 PROCEDURE — 84443 ASSAY THYROID STIM HORMONE: CPT | Performed by: PATHOLOGY

## 2024-08-22 PROCEDURE — 80053 COMPREHEN METABOLIC PANEL: CPT | Performed by: PATHOLOGY

## 2024-08-22 PROCEDURE — 80061 LIPID PANEL: CPT | Performed by: PATHOLOGY

## 2024-08-22 PROCEDURE — 83970 ASSAY OF PARATHORMONE: CPT | Performed by: PATHOLOGY

## 2024-08-22 PROCEDURE — 36415 COLL VENOUS BLD VENIPUNCTURE: CPT | Performed by: PATHOLOGY

## 2024-08-22 PROCEDURE — 99000 SPECIMEN HANDLING OFFICE-LAB: CPT | Performed by: PATHOLOGY

## 2024-08-22 PROCEDURE — 73110 X-RAY EXAM OF WRIST: CPT | Mod: RT | Performed by: RADIOLOGY

## 2024-08-22 PROCEDURE — 73130 X-RAY EXAM OF HAND: CPT | Mod: RT | Performed by: RADIOLOGY

## 2024-08-22 PROCEDURE — 85025 COMPLETE CBC W/AUTO DIFF WBC: CPT | Performed by: PATHOLOGY

## 2024-08-22 PROCEDURE — 82306 VITAMIN D 25 HYDROXY: CPT | Performed by: INTERNAL MEDICINE
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
## 2024-08-22 NOTE — PATIENT INSTRUCTIONS
What do osteoporosis medicines do?  If you have osteoporosis or a high risk of breaking a bone, the medicines your doctor prescribes can:    ?Reduce bone loss    ?Increase bone density or keep it about the same    ?Reduce the chances that you will break a bone    For the medicines to work, you must also take in enough calcium and vitamin D. Your doctor or nurse will tell you how much calcium and vitamin D you need each day. Many people need supplements to get enough.    Which medicines might I need?  There are many different osteoporosis medicines. Your doctor will work with you to choose the best one for you.    The list below gives basic information on osteoporosis medicines. The table lists the names of some common osteoporosis medicines (table 1).    For more detailed information about your medicines, ask your doctor or nurse for the patient drug information handout from [employer]. It explains how to use each medicine, describes its possible side effects, and lists other medicines or foods that can affect how it works.    Bisphosphonates  Most people being treated for osteoporosis take these medicines first. If they do not work well enough or cause side effects that are too troublesome, there are other options.    Bisphosphonates come as a pill or a shot. Most people take 1 pill every week. If your doctor prescribes a bisphosphonate pill, you must take the medicine exactly as directed. If you don't, the medicine can irritate your throat or stomach. For most bisphosphonate pills, you must:    ?Take the pill first thing in the morning, before you eat or drink anything.    ?Drink an 8-ounce glass of water with the pill, but don't eat or drink anything else for 30 minutes or 1 hour (depending on which pill you take).    ?Avoid lying down for 30 minutes after taking the pill. You must sit or stand during that time.    There is 1 bisphosphonate pill, delayed-release risedronate (brand name: Atelvia), that is taken in a  "different way from the others. It is taken after breakfast with 4 ounces of water.    \"Estrogen-like\" medicines  Medicines called selective estrogen receptor modifiers (\"SERMs\") act like the hormone estrogen. Estrogen helps prevent bone loss.    After menopause, when monthly periods stop, the body has less estrogen. SERMs can act like estrogen to stop bone loss. Some of them also reduce the risk of breast cancer in women at high risk. SERMs are only for people who have gone through menopause.    Hormone medicines  These medicines are sometimes called \"menopausal hormone therapy\" (\"MHT\"). After menopause, the body has lower levels of certain hormones. Some people take MHT to treat bothersome symptoms related to this, such as hot flashes. MHT can also protect against osteoporosis.    Hormones are not used often to treat osteoporosis in people who have gone through menopause. This is because other medicines usually work much better. But people who take MHT for hot flashes will have the extra benefit of preventing osteoporosis.    People who have not gone through menopause but have problems with their periods might take hormones in birth control pills or a patch.    Some males get osteoporosis because their bodies do not make enough of a hormone called \"testosterone.\" If this happens, doctors can give testosterone to prevent or treat the osteoporosis.    PTH or PTHrP analog  Both of these are artificial forms of hormones the body makes naturally. PTH stands for \"parathyroid hormone,\" and PTHrP stands for \"parathyroid hormone-related protein.\" Both tell the body to make new bone. They are usually only for people with severe osteoporosis.    Romosozumab  This is a medicine that blocks a protein in the body. The protein usually stops new bone from being formed. Blocking the protein lets the body make new bone. Romosozumab is usually only for people with severe osteoporosis.    Denosumab  This blocks a different protein in " the body. The protein usually causes bone to break down. By blocking the protein, denosumab reduces bone loss and the chance of breaking a bone. If other osteoporosis medicines cause bad side effects or do not help, your doctor might give you denosumab. It might also be a good choice for people with kidney problems.    When you stop taking denosumab, your bone density goes down again very quickly. Some people might be at higher risk for breaking a bone when this happens. If you stop denosumab, your doctor will prescribe a different osteoporosis medicine to prevent rapid bone loss.    How long do I need to take osteoporosis medicines?  If you remain at high risk for breaking a bone, you can safely take osteoporosis medicines for many years. If you are no longer at high risk for breaking a bone, you might be able to stop your medicine for a year or more. If you do stop the medicine, your doctor will check your bone density to make sure that you are not losing too much bone. You might need to start an osteoporosis medicine again later.    What else should I know about medicines for osteoporosis?  Some people have heard that taking bisphosphonates or denosumab for a long time can increase the risk of breaking certain bones. This is true, but it happens very rarely. Your chances of breaking a bone from osteoporosis are much higher than your chances of breaking one because you take bisphosphonates or denosumab.    Some people are at higher risk for breaking a bone after stopping denosumab. When denosumab is stopped, your doctor will prescribe a different osteoporosis medicine.    If you take osteoporosis medicines, your doctor will do regular exams and tests to see how well the medicines are working. If they are not working well, you might need a different medicine.    More on this topic    Patient education: Osteoporosis and osteopenia (low bone mass) (The Basics)  Patient education: Bone density testing (The  Basics)  Patient education: Calcium and vitamin D for bone health (The Basics)    Patient education: Osteoporosis prevention and treatment (Beyond the Basics)  Patient education: Bone density testing (Beyond the Basics)  Patient education: Calcium and vitamin D for bone health (Beyond the Basics)    Raloxifene (Evista)  Raloxifene (Evista) belongs to a class of drugs called selective estrogen receptor modulators (SERMs). It is FDA-approved for the prevention and treatment of osteoporosis in postmenopausal women and to reduce risk of invasive breast cancer in postmenopausal women at high risk or with osteoporosis.    SERMs were developed to reap the benefits of estrogen while avoiding the hormone's potential side effects. Raloxifene, a so-called '''' estrogen, can act like estrogen on bone -- protecting its density -- but as an anti-estrogen on the lining of the uterus.    In a three-year study involving some 600 postmenopausal women, raloxifene was found to increase bone density and lower LDL cholesterol, while having no stimulative effect on the uterine lining (which means that it is unlikely to cause uterine cancer).    The first SERM to reach the market was tamoxifen, which blocks the stimulative effect of estrogen on breast tissue. Tamoxifen has proven valuable in preventing cancer in the second breast of women who have had cancer in one breast.    Because of its anti-estrogen effects, the most common side effects with raloxifene are hot flashes. Conversely, because of its estrogenic effects, raloxifene increases the risk of blood clots, including deep vein thrombosis (DVT) and pulmonary embolism (blood clots in the lung).  Studies show that the risk of blood clots over a five year period is less than 1% for women who did not have a history of blood clots. Patients taking raloxifene should avoid tobacco use and prolonged periods of immobility during travel, when blood clots are more prone to occur.      The risk of deep vein thrombosis with raloxifene is probably comparable to that of estrogen, about 2 to 3 times higher than the usual low occurrence rate. Raloxifene also increases the risk of stroke death in women who have heart disease or risk factors for heart disease.    Raloxifene decreases the risk of spine fractures in postmenopausal women with osteoporosis, But it does not appear to decrease the risk of hip fracture. (The only agents that are definitely proven to decrease hip fracture risk are bisphosphonates.)    Another SERM combination drug, bazedoxifene-conjugated estrogen (Duavee) is FDA-approved to reduce hot flashes and osteoporosis in postmenopausal women.

## 2024-08-23 ENCOUNTER — TELEPHONE (OUTPATIENT)
Dept: ORTHOPEDICS | Facility: CLINIC | Age: 54
End: 2024-08-23
Payer: COMMERCIAL

## 2024-08-23 ENCOUNTER — TELEPHONE (OUTPATIENT)
Dept: INTERNAL MEDICINE | Facility: CLINIC | Age: 54
End: 2024-08-23
Payer: COMMERCIAL

## 2024-08-23 NOTE — TELEPHONE ENCOUNTER
Left Voicemail (1st Attempt) and Sent Mychart (1st Attempt) for the patient to call back and schedule the following:    Appointment type: New hand/wrist  Provider: [facility]  Return date: next available

## 2024-08-23 NOTE — TELEPHONE ENCOUNTER
----- Message from Fitz FATIMA sent at 8/23/2024  1:31 PM CDT -----  This could go either way.  It could start with Sports, or it could be hand first available would be fine, non urgent        FITZ GONZALEZ ATC  ----- Message -----  From: Shadia Hays  Sent: 8/23/2024  12:45 PM CDT  To: PATITO Gary!    This patient has a referral from Dr. Busch. Can you please take a peek and see if it would start with hand or sports?    Thanks!    Shadia

## 2024-08-23 NOTE — TELEPHONE ENCOUNTER
Left Voicemail (1st Attempt) and Sent Mychart (1st Attempt) for the patient to call back and schedule the following:    Appointment type: OT referral  Provider: per Dr. Gracia / Dr. Busch  Return date: any  Specialty phone number: [phone]

## 2024-08-28 ENCOUNTER — PATIENT OUTREACH (OUTPATIENT)
Dept: ONCOLOGY | Facility: CLINIC | Age: 54
End: 2024-08-28
Payer: COMMERCIAL

## 2024-08-28 ENCOUNTER — TRANSCRIBE ORDERS (OUTPATIENT)
Dept: OTHER | Age: 54
End: 2024-08-28

## 2024-08-28 DIAGNOSIS — Z80.0 FAMILY HISTORY OF COLON CANCER: Primary | ICD-10-CM

## 2024-08-28 NOTE — PROGRESS NOTES
Writer received referral to Cancer Risk Management/Genetic Counseling.    Referred for:   Call from patient/ Family history of cancer, recommended at mammogram screening     Referral reviewed for appropriate plan, and sent to New Patient Scheduling ([phone]) for completion.    Mirna Chi, RN, BSN  Oncology New Patient Nurse Navigator   [facility]

## 2024-08-30 ENCOUNTER — TELEPHONE (OUTPATIENT)
Dept: NEUROLOGY | Facility: CLINIC | Age: 54
End: 2024-08-30
Payer: COMMERCIAL

## 2024-08-30 NOTE — TELEPHONE ENCOUNTER
Left Voicemail (1st Attempt) and Sent Mychart (1st Attempt) for the patient to call back and schedule the following:    Appointment type: Return Neurology-virtual  Provider: Rose  Return date: around 2/21/2025   Specialty phone number: [phone]  Additional appointment(s) needed: NA  Additonal Notes: 6 mo follow up    Cele Rich on 8/30/2024 at 11:27 AM

## 2024-09-03 ENCOUNTER — ANCILLARY PROCEDURE (OUTPATIENT)
Dept: CT IMAGING | Facility: CLINIC | Age: 54
End: 2024-09-03
Attending: PSYCHIATRY & NEUROLOGY
Payer: COMMERCIAL

## 2024-09-03 ENCOUNTER — ANCILLARY PROCEDURE (OUTPATIENT)
Dept: ULTRASOUND IMAGING | Facility: CLINIC | Age: 54
End: 2024-09-03
Attending: PSYCHIATRY & NEUROLOGY
Payer: COMMERCIAL

## 2024-09-03 DIAGNOSIS — I87.1 COMPRESSION OF VEIN: ICD-10-CM

## 2024-09-03 DIAGNOSIS — G54.0 TOS (THORACIC OUTLET SYNDROME): ICD-10-CM

## 2024-09-03 DIAGNOSIS — R42 DIZZINESS: ICD-10-CM

## 2024-09-03 DIAGNOSIS — M54.2 NECK PAIN: ICD-10-CM

## 2024-09-03 PROCEDURE — 93970 EXTREMITY STUDY: CPT | Performed by: RADIOLOGY

## 2024-09-03 PROCEDURE — 70498 CT ANGIOGRAPHY NECK: CPT | Performed by: RADIOLOGY

## 2024-09-03 PROCEDURE — 70496 CT ANGIOGRAPHY HEAD: CPT | Performed by: RADIOLOGY

## 2024-09-03 PROCEDURE — 93922 UPR/L XTREMITY ART 2 LEVELS: CPT | Performed by: RADIOLOGY

## 2024-09-03 RX ORDER — IOPAMIDOL 755 MG/ML
70 INJECTION, SOLUTION INTRAVASCULAR ONCE
Status: COMPLETED | OUTPATIENT
Start: 2024-09-03 | End: 2024-09-03

## 2024-09-03 RX ADMIN — IOPAMIDOL 70 ML: 755 INJECTION, SOLUTION INTRAVASCULAR at 16:21

## 2024-09-03 NOTE — DISCHARGE INSTRUCTIONS

## 2024-09-04 LAB — RADIOLOGIST FLAGS: NORMAL

## 2024-09-06 ENCOUNTER — TELEPHONE (OUTPATIENT)
Dept: NEUROLOGY | Facility: CLINIC | Age: 54
End: 2024-09-06
Payer: COMMERCIAL

## 2024-09-06 NOTE — TELEPHONE ENCOUNTER
Other: [facility] calling back     Could we send this information to you in [employer] or would you prefer to receive a phone call?:   No preference   Okay to leave a detailed message?: No at Other phone number:

## 2024-09-06 NOTE — TELEPHONE ENCOUNTER
Imaging called about incidental finding on CTV completed on 8/21     IMPRESSION:   1. No narrowing of the bilateral internal jugular veins in the neutral  position. Mild narrowing of the bilateral internal jugular veins with  neck flexion. The physiologic/clinical significance of this finding is  uncertain and can be seen in the absence of symptoms.  2. Patent dural venous sinuses and major deep intracranial veins.  3. New partially visualized pleural thickening along the right major  fissure since 2022. Recommend correlation with chest CT.     [Consider Follow Up: New partially visualized pleural thickening along  the right major fissure since 2022. Recommend correlation with chest  CT.]

## 2024-09-09 NOTE — TELEPHONE ENCOUNTER
DIAGNOSIS: Patient hx of wrist locking/pain, found possible tfc tear on x ray. Right wrist pain, images in [employer], Busch, [employer]     APPOINTMENT DATE: 9.10.24   NOTES STATUS DETAILS   OFFICE NOTE from referring provider Internal 8.22.24  [facility]  [facility]   DISCHARGE REPORT from the ER CE 7.19.24  [facility]   MEDICATION LIST Internal    XRAYS (IMAGES & REPORTS) Internal 8.22.24, 7.19.24  XR Hand Right    8.22.24  XR Wrist Right

## 2024-09-10 ENCOUNTER — OFFICE VISIT (OUTPATIENT)
Dept: ORTHOPEDICS | Facility: CLINIC | Age: 54
End: 2024-09-10
Attending: INTERNAL MEDICINE
Payer: COMMERCIAL

## 2024-09-10 ENCOUNTER — PRE VISIT (OUTPATIENT)
Dept: ORTHOPEDICS | Facility: CLINIC | Age: 54
End: 2024-09-10

## 2024-09-10 DIAGNOSIS — M25.531 RIGHT WRIST PAIN: ICD-10-CM

## 2024-09-10 PROCEDURE — 99204 OFFICE O/P NEW MOD 45 MIN: CPT | Performed by: FAMILY MEDICINE

## 2024-09-10 NOTE — PROGRESS NOTES
[facility]  SPORTS & ORTHOPEDIC CLINIC VISIT     Sep 10, 2024        ASSESSMENT & PLAN    53-year-old with chronic episodes of right wrist pain and catching, more acutely with significant episode of locking on 8/18/2024.  Given her exam and findings on radiographs at this is likely due to the migration of the joint body.    Reviewed imaging and assessment with patient in detail  We discussed options for treatment at this time.  This could include initial trial at hand therapy, further imaging with MRI for better identification and localization of joint body which has likely been representative of her intermittent symptoms.  Possibly surgical referral based on result.  At this time the patient would like to pursue hand therapy and consider options for MRI.  Will contact us if she would like to pursue this imaging.      Alec Lang MD  [facility] SPORTS MEDICINE [facility]    -----  Chief Complaint   Patient presents with    Right Wrist - Pain       SUBJECTIVE  Kay Ceja is a/an 53 year old female who is seen in consultation at the request of  Arya Busch M.D. for evaluation of  right wrist pain.     The patient is seen by themselves.  The patient is Right handed    Onset: 8/18/24. Patient describes injury as being at the grocery store and bagging groceries and wrist seized up.   Location of Pain: right wrist   Worsened by: typing, fine motor skills, opening a jar,   Better with: NA   Treatments tried: no treatment tried to date  Associated symptoms: locking or catching    Orthopedic/Surgical history: NO  Social History/Occupation: Psychotherapist       REVIEW OF SYSTEMS:  Do you have fever, chills, weight loss? No  Do you have any vision problems? No  Do you have any chest pain or edema? No  Do you have any shortness of breath or wheezing?  No  Do you have stomach problems? No  Do you have any numbness or focal weakness? No  Do you have diabetes? No  Do you have  problems with bleeding or clotting? No  Do you have an rashes or other skin lesions? No    OBJECTIVE:  There were no vitals taken for this visit.     General  - alert, pleasant, no distress  CV  - normal radial pulse, cap refill brisk  Musculoskeletal -left wrist  - inspection: normal joint alignment, no obvious deformity, no swelling  - palpation: no bony or soft tissue tenderness, no tenderness at the anatomical snuffbox  - ROM:  90 deg flexion   70 deg extension   25 deg abduction   65 deg adduction  - strength: 5/5  strength, 5/5 flexion, extension, pronation, supination, adduction, abduction  - special tests:  (-) Tinel's  (-) Finkelstein  (-) Phalen  (-) Smith click test  (+) ulnar impaction  Neuro  - no sensory or motor deficit, grossly normal coordination, normal muscle tone  Skin  - no ecchymosis, erythema, warmth, or induration, no obvious rash        RADIOLOGY:    3 view xrays of right wrist performed 7/19/2024 and 8/22/2024 and reviewed independently demonstrating Interval change of location of ossicle or calcification which previously localized near DRUJ is now positioned distal to the ulna.. See EMR for formal radiology report.

## 2024-09-10 NOTE — LETTER
9/10/2024      RE: Kay Ceja  [address]  [facility] [address]     Dear Colleague,    Thank you for referring your patient, Kay Ceja, to the [facility] SPORTS MEDICINE [facility]. Please see a copy of my visit note below.      [facility]  SPORTS & ORTHOPEDIC CLINIC VISIT     Sep 10, 2024        ASSESSMENT & PLAN    53-year-old with chronic episodes of right wrist pain and catching, more acutely with significant episode of locking on 8/18/2024.  Given her exam and findings on radiographs at this is likely due to the migration of the joint body.    Reviewed imaging and assessment with patient in detail  We discussed options for treatment at this time.  This could include initial trial at hand therapy, further imaging with MRI for better identification and localization of joint body which has likely been representative of her intermittent symptoms.  Possibly surgical referral based on result.  At this time the patient would like to pursue hand therapy and consider options for MRI.  Will contact us if she would like to pursue this imaging.      Alec Lang MD  [facility] SPORTS MEDIC[facility]    -----  Chief Complaint   Patient presents with     Right Wrist - Pain       SUBJECTIVE  Kay Ceja is a/an 53 year old female who is seen in consultation at the request of  Arya Busch M.D. for evaluation of  right wrist pain.     The patient is seen by themselves.  The patient is Right handed    Onset: 8/18/24. Patient describes injury as being at the grocery store and bagging groceries and wrist seized up.   Location of Pain: right wrist   Worsened by: typing, fine motor skills, opening a jar,   Better with: NA   Treatments tried: no treatment tried to date  Associated symptoms: locking or catching    Orthopedic/Surgical history: NO  Social History/Occupation: Psychotherapist       REVIEW OF SYSTEMS:  Do you have fever, chills, weight loss?  No  Do you have any vision problems? No  Do you have any chest pain or edema? No  Do you have any shortness of breath or wheezing?  No  Do you have stomach problems? No  Do you have any numbness or focal weakness? No  Do you have diabetes? No  Do you have problems with bleeding or clotting? No  Do you have an rashes or other skin lesions? No    OBJECTIVE:  There were no vitals taken for this visit.     General  - alert, pleasant, no distress  CV  - normal radial pulse, cap refill brisk  Musculoskeletal -left wrist  - inspection: normal joint alignment, no obvious deformity, no swelling  - palpation: no bony or soft tissue tenderness, no tenderness at the anatomical snuffbox  - ROM:  90 deg flexion   70 deg extension   25 deg abduction   65 deg adduction  - strength: 5/5  strength, 5/5 flexion, extension, pronation, supination, adduction, abduction  - special tests:  (-) Tinel's  (-) Finkelstein  (-) Phalen  (-) Smith click test  (+) ulnar impaction  Neuro  - no sensory or motor deficit, grossly normal coordination, normal muscle tone  Skin  - no ecchymosis, erythema, warmth, or induration, no obvious rash        RADIOLOGY:    3 view xrays of right wrist performed 7/19/2024 and 8/22/2024 and reviewed independently demonstrating Interval change of location of ossicle or calcification which previously localized near DRUJ is now positioned distal to the ulna.. See EMR for formal radiology report.             Again, thank you for allowing me to participate in the care of your patient.      Sincerely,    Alec Lang MD

## 2024-09-11 ENCOUNTER — OFFICE VISIT (OUTPATIENT)
Dept: DERMATOLOGY | Facility: CLINIC | Age: 54
End: 2024-09-11
Attending: DERMATOLOGY
Payer: COMMERCIAL

## 2024-09-11 ENCOUNTER — THERAPY VISIT (OUTPATIENT)
Dept: OCCUPATIONAL THERAPY | Facility: CLINIC | Age: 54
End: 2024-09-11
Attending: INTERNAL MEDICINE
Payer: COMMERCIAL

## 2024-09-11 DIAGNOSIS — D49.2 NEOPLASM OF UNSPECIFIED BEHAVIOR OF BONE, SOFT TISSUE, AND SKIN: Primary | ICD-10-CM

## 2024-09-11 DIAGNOSIS — Z85.828 HISTORY OF NONMELANOMA SKIN CANCER: ICD-10-CM

## 2024-09-11 DIAGNOSIS — L70.0 ACNE VULGARIS: ICD-10-CM

## 2024-09-11 DIAGNOSIS — I87.1 COMPRESSION OF VEIN: ICD-10-CM

## 2024-09-11 DIAGNOSIS — M54.2 NECK PAIN: ICD-10-CM

## 2024-09-11 DIAGNOSIS — L82.0 INFLAMED SEBORRHEIC KERATOSIS: ICD-10-CM

## 2024-09-11 DIAGNOSIS — Z86.018 HISTORY OF DYSPLASTIC NEVUS: ICD-10-CM

## 2024-09-11 DIAGNOSIS — D22.9 MULTIPLE BENIGN NEVI: ICD-10-CM

## 2024-09-11 DIAGNOSIS — L81.4 LENTIGINES: ICD-10-CM

## 2024-09-11 DIAGNOSIS — M25.531 RIGHT WRIST PAIN: ICD-10-CM

## 2024-09-11 DIAGNOSIS — G54.0 TOS (THORACIC OUTLET SYNDROME): Primary | ICD-10-CM

## 2024-09-11 DIAGNOSIS — J34.89 NASAL VESTIBULITIS: ICD-10-CM

## 2024-09-11 DIAGNOSIS — L57.0 ACTINIC KERATOSIS: ICD-10-CM

## 2024-09-11 DIAGNOSIS — G24.3 CERVICAL DYSTONIA: ICD-10-CM

## 2024-09-11 DIAGNOSIS — M43.6 CONTRACTURE OF STERNOCLEIDOMASTOID MUSCLE: ICD-10-CM

## 2024-09-11 DIAGNOSIS — Z86.006 HX OF MELANOMA IN SITU: ICD-10-CM

## 2024-09-11 PROCEDURE — 88305 TISSUE EXAM BY PATHOLOGIST: CPT | Mod: 26 | Performed by: DERMATOLOGY

## 2024-09-11 PROCEDURE — 11102 TANGNTL BX SKIN SINGLE LES: CPT | Performed by: DERMATOLOGY

## 2024-09-11 PROCEDURE — 97110 THERAPEUTIC EXERCISES: CPT | Mod: GO | Performed by: OCCUPATIONAL THERAPIST

## 2024-09-11 PROCEDURE — 99213 OFFICE O/P EST LOW 20 MIN: CPT | Mod: 25 | Performed by: DERMATOLOGY

## 2024-09-11 PROCEDURE — 17110 DESTRUCTION B9 LES UP TO 14: CPT | Mod: XS | Performed by: DERMATOLOGY

## 2024-09-11 PROCEDURE — 88305 TISSUE EXAM BY PATHOLOGIST: CPT | Mod: TC | Performed by: DERMATOLOGY

## 2024-09-11 PROCEDURE — 17000 DESTRUCT PREMALG LESION: CPT | Mod: XS | Performed by: DERMATOLOGY

## 2024-09-11 PROCEDURE — 97535 SELF CARE MNGMENT TRAINING: CPT | Mod: GO | Performed by: OCCUPATIONAL THERAPIST

## 2024-09-11 PROCEDURE — 97165 OT EVAL LOW COMPLEX 30 MIN: CPT | Mod: GO | Performed by: OCCUPATIONAL THERAPIST

## 2024-09-11 RX ORDER — MUPIROCIN 20 MG/G
OINTMENT TOPICAL 2 TIMES DAILY
Qty: 22 G | Refills: 1 | Status: SHIPPED | OUTPATIENT
Start: 2024-09-11

## 2024-09-11 RX ORDER — TRETINOIN 0.25 MG/G
CREAM TOPICAL
Qty: 45 G | Refills: 11 | Status: SHIPPED | OUTPATIENT
Start: 2024-09-11

## 2024-09-11 ASSESSMENT — PAIN SCALES - GENERAL: PAINLEVEL: NO PAIN (0)
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
## 2024-09-11 NOTE — NURSING NOTE
Dermatology Rooming Note    Kay Ceja's goals for this visit include:   Chief Complaint   Patient presents with    Skin Check     Here today for a skin check. Several spots of concern.      Lucina Elizalde RN

## 2024-09-11 NOTE — PATIENT INSTRUCTIONS
.Cryotherapy    What is it?  Use of a very cold liquid, such as liquid nitrogen, to freeze and destroy abnormal skin cells that need to be removed    What should I expect?  Tenderness and redness  A small blister that might grow and fill with dark purple blood. There may be crusting.  More than one treatment may be needed if the lesions do not go away.    How do I care for the treated area?  Gently wash the area with your hands when bathing.  Use a thin layer of Vaseline to help with healing. You may use a Band-Aid.   The area should heal within 7-10 days and may leave behind a pink or lighter color.   Do not use an antibiotic or Neosporin ointment.   You may take acetaminophen (Tylenol) for pain.     Call your doctor if you have:  Severe pain  Signs of infection (warmth, redness, cloudy yellow drainage, and or a bad smell)  Questions or concerns    Who should I call with questions?      [facility]: [phone]      [facility]: [phone]      For urgent needs outside of business hours call the [facility] at [phone] and ask for the dermatology resident on call     Checking for Skin Cancer  You can help find cancer early by checking your skin each month. There are 3 main kinds of skin cancer: melanoma, basal cell carcinoma, and squamous cell carcinoma. Doing monthly skin checks is the best way to find new marks, sores, or skin changes. Follow these instructions for checking your skin.   The ABCDEs of checking moles for melanoma   Check your moles or growths for signs of melanoma using ABCDE:   Asymmetry: The sides of the mole or growth don t match.  Border: The edges are ragged, notched, or blurred.  Color: The color within the mole or growth varies. It could be black, brown, tan, white, or shades of red, gray, or blue.  Diameter: The mole or growth is larger than   inch or 6 mm (size of a pencil eraser).  Evolving: The size, shape,  texture, or color of the mole or growth is changing.     ABCDE's of moles on light skin.        ABCDE's of moles on dark skin may be harder to identify.     Checking for other types of skin cancer  Basal cell carcinoma or squamous cell carcinoma cause symptoms like:     A spot or mole that looks different from all other marks on your skin  Changes in how an area feels, such as itching, tenderness, or pain  Changes in the skin's surface, such as oozing, bleeding, or scaliness  A sore that doesn't heal  New swelling, redness, or spread of color beyond the border of a mole    Who s at risk?  Anyone of any skin color can get skin cancer. But you're at greater risk if you have:   Fair skin that freckles easily and burns instead of tanning  Light-colored or red hair  Light-colored eyes  Many moles or abnormal moles on your skin  A long history of unprotected exposure to sunlight or tanning beds  A history of many blistering sunburns as a child or teen  A family history of skin cancer  Been exposed to radiation or chemicals  A weakened immune system  Been exposed to arsenic  If you've had skin cancer in the past, you're at high risk of having it again.   How to check your skin  Do your monthly skin checkups in front of a full-length mirror. Use a room with good lighting so it's easier to see. Use a hand mirror to look at hard-to-see places like your buttocks and back. You can also have a trusted friend or family member help you with these checks. Check every part of your body, including your:   Head (ears, face, neck, and scalp)  Torso (front, back, sides, and under breasts)  Arms (tops, undersides, and armpits)  Hands (palms, backs, and fingers, including under the nails)  Lower back, buttocks, and genitals  Legs (front, back, and sides)  Feet (tops, soles, toes, including under the nails, and between toes)  Watch for new spots on your skin or a spot that's changing in color, shape, size.   If you have a lot of moles,  take digital photos of them each month. Make sure to take photos both up close and from a distance. These can help you see if any moles change over time.   Know your skin  Most skin changes aren't cancer. But if you see any changes in your skin, call your healthcare provider right away. Only they can tell you if a change is a problem. If you have skin cancer, seeing your provider can be the first step to getting the treatment that could save your life.   [employer] last reviewed this educational content on 10/1/2021    5640-4532 [employer]. All rights reserved. This information is not intended as a substitute for professional medical care. Always follow your healthcare professional's instructions.     Wound Care After a Biopsy    What is a skin biopsy?  A skin biopsy allows the doctor to examine a very small piece of tissue under the microscope to determine the diagnosis and the best treatment for the skin condition. A local anesthetic (numbing medicine) is injected with a very small needle into the skin area to be tested. A small piece of skin is taken from the area. Sometimes a suture (stitch) is used.     What are the risks of a skin biopsy?  I will experience scar, bleeding, swelling, pain, crusting and redness. I may experience incomplete removal or recurrence. Risks of this procedure are excessive bleeding, bruising, infection, nerve damage, numbness, thick (hypertrophic or keloidal) scar and non-diagnostic biopsy.    How should I care for my wound for the first 24 hours?  Keep the wound dry and covered for 24 hours  If it bleeds, hold direct pressure on the area for 15 minutes. If bleeding does not stop, call us or go to the emergency room  Avoid strenuous exercise the first 1-2 days or as your doctor instructs you    How should I care for the wound after 24 hours?  After 24 hours, remove the bandage  You may bathe or shower as normal  If you had a scalp biopsy, you can shampoo as usual and can  use shower water to clean the biopsy site daily  Clean the wound once a day with gentle soap and water  Do not scrub, be gentle  Apply white petroleum/Vaseline after cleaning the wound with a cotton swab or a clean finger, and keep the site covered with a Bandaid /bandage. Bandages are not necessary with a scalp biopsy  If you are unable to cover the site with a Bandaid /bandage, re-apply ointment 2-3 times a day to keep the site moist. Moisture will help with healing  Avoid strenuous activity for first 1-2 days  Avoid lakes, rivers, pools, and oceans until the stitches are removed or the site is healed    How do I clean my wound?  Wash hands thoroughly with soap or use hand  before all wound care  Clean the wound with gentle soap and water  Apply white petroleum/Vaseline  to wound after it is clean  Replace the Bandaid /bandage to keep the wound covered for the first few days or as instructed by your doctor  If you had a scalp biopsy, warm shower water to the area on a daily basis should suffice    What should I use to clean my wound?   Cotton-tipped applicators (Qtips )  White petroleum jelly (Vaseline ). Use a clean new container and use Q-tips to apply.  Bandaids  as needed  Gentle soap     How should I care for my wound long term?  Do not get your wound dirty  Keep up with wound care for one week or until the area is healed.  If you have stitches, stitches need to be removed in 14 days. You may return to our clinic for this or you may have it done locally at your doctor s office.  A small scab will form and fall off by itself when the area is completely healed. The area will be red and will become pink in color as it heals. Sun protection is very important for how your scar will turn out. Sunscreen with an SPF 30 or greater is recommended once the area is healed.  You should have some soreness but it should be mild and slowly go away over several days. Talk to your doctor about using tylenol for  pain,    When should I call my doctor?  If you have increased:   Pain or swelling  Pus or drainage (clear or slightly yellow drainage is ok)  Temperature over 100F  Spreading redness or warmth around wound    When will I hear about my results?  The biopsy results can take 2 weeks to come back.  Your results will automatically release to [employer] before your provider has even reviewed them.  The clinic will call you with the results, send you a j[employer]store message, or have you schedule a follow-up clinic or phone time to discuss the results.  Contact our clinics if you do not hear from us in 2 weeks.    Who should I call with questions?  [facility]: [phone]  [facility]: [phone]  For urgent needs outside of business hours call the [facility] at [phone] and ask for the dermatology resident on call

## 2024-09-11 NOTE — PROGRESS NOTES
OCCUPATIONAL THERAPY EVALUATION  Type of Visit: Evaluation       Fall Risk Screen:  Fall screen completed by: OT  Have you fallen 2 or more times in the past year?: No  Have you fallen and had an injury in the past year?: No  Is patient a fall risk?: No    Subjective      Presenting condition or subjective complaint: R wrist pain  Date of onset: 08/22/24 (Order Date)    Relevant medical history:     Past Medical History:   Diagnosis Date    Anxiety     Basal cell carcinoma     Insomnia     Migraines     Osteopenia     T score -1.9 hip and back 1/19/11    Vasomotor rhinitis     uses nasalcort       Dates & types of surgery:    Past Surgical History:   Procedure Laterality Date    basal cell ca excision      BIOPSY  2012    skin: moles and basal cell asmt    COLONOSCOPY N/A 9/22/2022    Procedure: COLONOSCOPY;  Surgeon: Kay Moscoso MD;  Location: [facility] OR    DENTAL SURGERY  08/21/2020    Dental implant    IR CHEST TUBE PLACEMENT NON-TUNNELED RIGHT  10/12/2021    MOHS MICROGRAPHIC PROCEDURE           Prior diagnostic imaging/testing results: X-ray     Prior therapy history for the same diagnosis, illness or injury: No      Prior Level of Function  Transfers: Independent  Ambulation: Independent  ADL: Independent  IADL:  IND    Living Environment  Social support: With a significant other or spouse   Type of home: House     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  Hobbies/Interests: working out    Patient goals for therapy:      Pain assessment: Pain present     Objective   ADDITIONAL HISTORY:  Right hand dominant  Patient reports symptoms of pain and stiffness/loss of motion  Transportation: drives  Currently working in normal job without restrictions    PAIN:  Pain Level at Rest: 1/10  Pain Level with Use: 7/10  Pain Location: wrist and DRUJ  Pain Quality: Burning, Nagging, and Throbbing  Pain Frequency: intermittent  Pain is Worst: daytime  Pain is Exacerbated By: supinated weight  lifting,typing, finger movement  Pain is Relieved By: heat and rest  Pain Progression: Improved    EDEMA: None     SENSATION: WNL throughout all nerve distributions; per patient report     ROM:   Wrist ROM  Left AROM Right AROM    Extension 59 54   Flexion 69 54   Radial Deviation (RD) 25 25   Ulnar Deviation (UD) 30 35   Supination 90 85   Pronation 90 90       SPECIAL TESTS:   Ulnar Dorsal Zone Left Right   TFCC load Test (UD, axially load wrist c volar/dorsal mvmt)  -   Lunotriquetral Sheer Test - -   Piano Key Sign (DRUJ) - Increased movement volary but no pain noted    Piano Key Test (DRUJ--distal ulna moved in pro/sup) - Increased movement but no pain noted   Volar RU Ligament Shift (DRUJ--stab ulna and wrist, push radius volarly) - -   Dorsal RU Ligament Shift (DRUJ--stab ulna and wrist, push radius dorsally) - Increased movement but no pain noted     ECU Synergy test  minimal pain over volar wrist    RESISTED TESTING: Resisted Testing (pain report)   Left Right   Elbow Extension     Elbow Flexion     Supination   5   Pronation  5   Wrist Ext with RD, Elbow at side  5   Wrist Ext with UD, Elbow at side  5   Wrist Flex with RD, Elbow at side  4 ++ in DRUJ   Wrist Flex with UD, Elbow at side  5      STRENGTH:     Measured in pounds 9/11/2024 9/11/2024    Left Right   Trial 1 42 45     Lateral Pinch  Measured in pounds 9/11/2024 9/11/2024    Left Right   Trial 1 11 13     3 Point Pinch  Measured in pounds 9/11/2024 9/11/2024    Left Right   Trial 1 13 14       PALPATION:   Wrist Palpation    1st Dorsal Compartment -   Distal Radius -   Radial Styloid -   FCR -   Dorsal Sensory Radial Nerve (DSRN) -   DRUJ -   Ulna Styloid -   TFCC -   Volar Scaphoid -   Dorsal Scaphoid -   Volar Lunate -   Dorsal Lunate -       Assessment & Plan   CLINICAL IMPRESSIONS  Medical Diagnosis: R Wrist Pain    Treatment Diagnosis: R Wrist Pain    Impression/Assessment: Pt is a 53 year old female presenting to Occupational Therapy  due to R Wrist Pain.  The following significant findings have been identified: Impaired ROM, Impaired strength, and Pain.  These identified deficits interfere with their ability to perform work tasks, recreational activities, and household chores as compared to previous level of function.     Clinical Decision Making (Complexity):  Assessment of Occupational Performance: 1-3 Performance Deficits  Occupational Performance Limitations: home establishment and management, Hinduism and spiritual activities and expression, and leisure activities  Clinical Decision Making (Complexity): Low complexity    PLAN OF CARE  Treatment Interventions:  Modalities:  US, Iontophoresis, Paraffin, TENS, and E-Stim  Therapeutic Exercise:  AROM, AAROM, PROM, Tendon Gliding, Blocking, Reverse Blocking, Place and Hold, Contract Relax, Extensor Tracking, Isotonics, Isometrics, and Stabilization  Neuromuscular re-education:  Nerve Gliding, Coordination/Dexterity, Sensory re-education, Desensitization, Kinesthetic Training, Proprioceptive Training, Kinesiotaping, Isometrics, and Stabilization  Manual Techniques:  Coordination/Dexterity, Joint mobilization, Scar mobilization, Friction massage, Myofascial release, and Manual edema mobilization  Orthotic Fabrication:  Static, Static progressive, and Dynamic  Self Care:  Self Care Tasks, Ergonomic Considerations, and Work Tasks    Long Term Goals   OT Goal 1  Goal Identifier: Meal Prep  Goal Description: Pt will open new or tight jar with 0/10 pain reported.  Rationale: In order to maximize safety and independence with performance of self-care activities  Goal Progress: Initial  Target Date: 11/11/24      Frequency of Treatment: 1x a week  Duration of Treatment: 8 weeks     Recommended Referrals to Other Professionals:   Education Assessment: Learner/Method: Patient;Demonstration;Pictures/Video;No Barriers to Learning     Risks and benefits of evaluation/treatment have been explained.    Patient/Family/caregiver agrees with Plan of Care.     Evaluation Time:    OT Eval, Low Complexity Minutes (30391): 25       Signing Clinician: YANG Tierney
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
## 2024-09-11 NOTE — NURSING NOTE
Lidocaine-epinephrine 1-1:189975 % injection   0.25mL once for one use, starting 9/11/2024 ending 9/11/2024,  2mL disp, R-0, injection  Injected by Lucina Elizalde RN       no

## 2024-09-12 DIAGNOSIS — J98.19 PULMONARY COLLAPSE: ICD-10-CM

## 2024-09-12 DIAGNOSIS — J92.9 PLEURAL THICKENING: Primary | ICD-10-CM

## 2024-09-25 ENCOUNTER — ANCILLARY PROCEDURE (OUTPATIENT)
Dept: CT IMAGING | Facility: CLINIC | Age: 54
End: 2024-09-25
Attending: PSYCHIATRY & NEUROLOGY
Payer: COMMERCIAL

## 2024-09-25 DIAGNOSIS — J92.9 PLEURAL THICKENING: ICD-10-CM

## 2024-09-25 DIAGNOSIS — J98.19 PULMONARY COLLAPSE: ICD-10-CM

## 2024-09-25 PROCEDURE — 71260 CT THORAX DX C+: CPT | Mod: GC | Performed by: RADIOLOGY

## 2024-09-25 RX ORDER — IOPAMIDOL 755 MG/ML
53 INJECTION, SOLUTION INTRAVASCULAR ONCE
Status: COMPLETED | OUTPATIENT
Start: 2024-09-25 | End: 2024-09-25

## 2024-09-25 RX ADMIN — IOPAMIDOL 53 ML: 755 INJECTION, SOLUTION INTRAVASCULAR at 15:36

## 2024-09-25 NOTE — DISCHARGE INSTRUCTIONS

## 2024-10-17 ENCOUNTER — THERAPY VISIT (OUTPATIENT)
Dept: PHYSICAL THERAPY | Facility: CLINIC | Age: 54
End: 2024-10-17
Attending: PSYCHIATRY & NEUROLOGY
Payer: COMMERCIAL

## 2024-10-17 DIAGNOSIS — G54.0 TOS (THORACIC OUTLET SYNDROME): ICD-10-CM

## 2024-10-17 DIAGNOSIS — G24.3 CERVICAL DYSTONIA: ICD-10-CM

## 2024-10-17 DIAGNOSIS — I87.1 COMPRESSION OF VEIN: ICD-10-CM

## 2024-10-17 DIAGNOSIS — M43.6 CONTRACTURE OF STERNOCLEIDOMASTOID MUSCLE: Primary | ICD-10-CM

## 2024-10-17 DIAGNOSIS — M54.2 NECK PAIN: ICD-10-CM

## 2024-10-17 PROCEDURE — 97162 PT EVAL MOD COMPLEX 30 MIN: CPT | Mod: GP

## 2024-10-17 PROCEDURE — 97110 THERAPEUTIC EXERCISES: CPT | Mod: GP
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
## 2024-10-24 ENCOUNTER — THERAPY VISIT (OUTPATIENT)
Dept: PHYSICAL THERAPY | Facility: CLINIC | Age: 54
End: 2024-10-24
Attending: PSYCHIATRY & NEUROLOGY
Payer: COMMERCIAL

## 2024-10-24 DIAGNOSIS — G54.0 TOS (THORACIC OUTLET SYNDROME): ICD-10-CM

## 2024-10-24 DIAGNOSIS — I87.1 COMPRESSION OF VEIN: ICD-10-CM

## 2024-10-24 DIAGNOSIS — M43.6 CONTRACTURE OF STERNOCLEIDOMASTOID MUSCLE: Primary | ICD-10-CM

## 2024-10-24 DIAGNOSIS — M54.2 NECK PAIN: ICD-10-CM

## 2024-10-24 DIAGNOSIS — G24.3 CERVICAL DYSTONIA: ICD-10-CM

## 2024-10-24 PROCEDURE — 97110 THERAPEUTIC EXERCISES: CPT | Mod: GP

## 2024-10-24 PROCEDURE — 97140 MANUAL THERAPY 1/> REGIONS: CPT | Mod: GP

## 2024-10-28 NOTE — TELEPHONE ENCOUNTER
FUTURE VISIT INFORMATION      FUTURE VISIT INFORMATION:  Date: 1/22/25  Time: 9:30am  Location[place] [facility] [place]  REFERRAL INFORMATION:  Referring provider:  Dr. Lisy Toth   Referring providers clinic:  [facility]   Reason for visit/diagnosis  Facial scar     RECORDS REQUESTED FROM:       Clinic name Comments Records Status Imaging Status   [facility]  Ov/referral 7/23/24 EPIC

## 2024-10-31 ENCOUNTER — THERAPY VISIT (OUTPATIENT)
Dept: PHYSICAL THERAPY | Facility: CLINIC | Age: 54
End: 2024-10-31
Attending: PSYCHIATRY & NEUROLOGY
Payer: COMMERCIAL

## 2024-10-31 DIAGNOSIS — M54.2 NECK PAIN: ICD-10-CM

## 2024-10-31 DIAGNOSIS — G54.0 TOS (THORACIC OUTLET SYNDROME): ICD-10-CM

## 2024-10-31 DIAGNOSIS — I87.1 COMPRESSION OF VEIN: ICD-10-CM

## 2024-10-31 DIAGNOSIS — G24.3 CERVICAL DYSTONIA: ICD-10-CM

## 2024-10-31 DIAGNOSIS — M43.6 CONTRACTURE OF STERNOCLEIDOMASTOID MUSCLE: Primary | ICD-10-CM

## 2024-10-31 PROCEDURE — 97140 MANUAL THERAPY 1/> REGIONS: CPT | Mod: GP

## 2024-11-04 ENCOUNTER — TELEPHONE (OUTPATIENT)
Dept: NEUROLOGY | Facility: CLINIC | Age: 54
End: 2024-11-04
Payer: COMMERCIAL

## 2024-11-04 ASSESSMENT — ANXIETY QUESTIONNAIRES: GAD7 TOTAL SCORE: 3

## 2024-11-04 NOTE — TELEPHONE ENCOUNTER
Prior Authorization Retail Medication Request  RENEWAL    Medication/Dose: Ajovy 225 mg every 30 days  Diagnosis and ICD code (if different than what is on RX):    New/renewal/insurance change PA/secondary ins. PA:  Previously Tried and Failed:   Sumatriptan, naratriptan, naproxen..-wellbutrin, luvax, prozac, zolft tried in the past and did not like how they make her feel. Family history of bipolar and saw family members detrimental effect from psychotropic meds. Would avoid BBB with history of depression and borderline low blood pressure  Discussed a trial of topiramate for migraine prevention.      Rationale:  migraine     Insurance   Primary: [employer]  Insurance ID:  [id number]

## 2024-11-05 NOTE — TELEPHONE ENCOUNTER
Retail Pharmacy Prior Authorization Team   Phone: [phone]    Prior Authorization Approval    Medication: AJOVY 225 MG/1.5ML SC SOAJ  Authorization Effective Date: 10/6/2024  Authorization Expiration Date: 11/5/2025  Insurance Company: [employer] - Phone [phone] Fax [phone]  Which Pharmacy is filling the prescription: [facility] [id number] [place], [place] - [facility] [address] [facility]  Pharmacy Notified: YES, via fax    Patient Notified: YES, via [employer] Travel Message

## 2024-11-05 NOTE — TELEPHONE ENCOUNTER
Retail Pharmacy Prior Authorization Team   Phone: [phone]    PA Initiation    Medication: AJOVY 225 MG/1.5ML SC SOAJ  Insurance Company: [employer] - Phone [phone] Fax [phone]  Pharmacy Filling the Rx: [id number] - [place], [place] - [address]  Filling Pharmacy Phone: [phone]  Filling Pharmacy Fax:    Start Date: 11/5/2024

## 2024-11-19 ENCOUNTER — CARE COORDINATION (OUTPATIENT)
Dept: NEUROLOGY | Facility: CLINIC | Age: 54
End: 2024-11-19
Payer: COMMERCIAL

## 2024-11-19 DIAGNOSIS — G43.709 CHRONIC MIGRAINE WITHOUT AURA WITHOUT STATUS MIGRAINOSUS, NOT INTRACTABLE: ICD-10-CM

## 2024-11-19 RX ORDER — FREMANEZUMAB-VFRM 225 MG/1.5ML
225 INJECTION SUBCUTANEOUS
Qty: 1.5 ML | Refills: 11 | Status: SHIPPED | OUTPATIENT
Start: 2024-11-19

## 2024-11-19 NOTE — PROGRESS NOTES
Ajovy prescription and PA approval information faxed to [facility].  [phone]  I asked that they get this ready as soon as possible for the patient.

## 2024-11-23 ENCOUNTER — HEALTH MAINTENANCE LETTER (OUTPATIENT)
Age: 54
End: 2024-11-23

## 2024-11-25 DIAGNOSIS — M85.89 OSTEOPENIA OF MULTIPLE SITES: Primary | ICD-10-CM

## 2024-11-26 ENCOUNTER — THERAPY VISIT (OUTPATIENT)
Dept: PHYSICAL THERAPY | Facility: CLINIC | Age: 54
End: 2024-11-26
Payer: COMMERCIAL

## 2024-11-26 DIAGNOSIS — G54.0 TOS (THORACIC OUTLET SYNDROME): ICD-10-CM

## 2024-11-26 DIAGNOSIS — M43.6 CONTRACTURE OF STERNOCLEIDOMASTOID MUSCLE: Primary | ICD-10-CM

## 2024-11-26 DIAGNOSIS — G24.3 CERVICAL DYSTONIA: ICD-10-CM

## 2024-11-26 DIAGNOSIS — M54.2 NECK PAIN: ICD-10-CM

## 2024-11-26 DIAGNOSIS — I87.1 COMPRESSION OF VEIN: ICD-10-CM

## 2024-12-03 ENCOUNTER — TELEPHONE (OUTPATIENT)
Dept: PLASTIC SURGERY | Facility: CLINIC | Age: 54
End: 2024-12-03
Payer: COMMERCIAL

## 2024-12-03 NOTE — TELEPHONE ENCOUNTER
Patient confirmed scheduled appointment:  Date: 02/12/2025  Time: 1030 am   Visit type: New Plastic Surgery   Provider:    Location: [facility]  Testing/imaging: N/a  Additional notes: Pt r/s from 1/22/25

## 2024-12-04 NOTE — TELEPHONE ENCOUNTER
FUTURE VISIT INFORMATION      FUTURE VISIT INFORMATION:  Date: 2/12/25  Time: 10:30am  Location: [facility] [place]  REFERRAL INFORMATION:  Referring provider:  Dr. Lisy Toth   Referring providers clinic:  [facility]   Reason for visit/diagnosis  Facial scar      RECORDS REQUESTED FROM:         Clinic name Comments Records Status Imaging Status   [facility]  Ov/referral 7/23/24 EPIC

## 2024-12-12 ENCOUNTER — THERAPY VISIT (OUTPATIENT)
Dept: PHYSICAL THERAPY | Facility: CLINIC | Age: 54
End: 2024-12-12
Payer: COMMERCIAL

## 2024-12-12 DIAGNOSIS — G24.3 CERVICAL DYSTONIA: ICD-10-CM

## 2024-12-12 DIAGNOSIS — M43.6 CONTRACTURE OF STERNOCLEIDOMASTOID MUSCLE: Primary | ICD-10-CM

## 2024-12-12 DIAGNOSIS — M54.2 NECK PAIN: ICD-10-CM

## 2024-12-12 DIAGNOSIS — G54.0 TOS (THORACIC OUTLET SYNDROME): ICD-10-CM

## 2024-12-12 DIAGNOSIS — I87.1 COMPRESSION OF VEIN: ICD-10-CM

## 2025-01-11 NOTE — TELEPHONE ENCOUNTER
Was waiting on a spot from Dr. Rojas, will call patient back with a time/day. Left patient a voicemail to schedule mohs for 10/31 per Dr. Bob Lewis, Surgery Scheduler 10/18/2022 at 8:21 AM     pt arrives as notification for cardiac arrest. intubated in field. respiratory called prior to patient arrival. charge RN notified

## 2025-01-22 ENCOUNTER — PRE VISIT (OUTPATIENT)
Dept: PLASTIC SURGERY | Facility: CLINIC | Age: 55
End: 2025-01-22
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
## 2025-01-29 ENCOUNTER — ANCILLARY PROCEDURE (OUTPATIENT)
Dept: BONE DENSITY | Facility: CLINIC | Age: 55
End: 2025-01-29
Attending: FAMILY MEDICINE
Payer: COMMERCIAL

## 2025-01-29 ENCOUNTER — OFFICE VISIT (OUTPATIENT)
Dept: NEUROLOGY | Facility: CLINIC | Age: 55
End: 2025-01-29
Payer: COMMERCIAL

## 2025-01-29 VITALS
HEART RATE: 57 BPM | HEIGHT: 64 IN | BODY MASS INDEX: 18.68 KG/M2 | SYSTOLIC BLOOD PRESSURE: 102 MMHG | RESPIRATION RATE: 16 BRPM | OXYGEN SATURATION: 99 % | DIASTOLIC BLOOD PRESSURE: 70 MMHG | WEIGHT: 109.4 LBS

## 2025-01-29 DIAGNOSIS — G54.0 TOS (THORACIC OUTLET SYNDROME): ICD-10-CM

## 2025-01-29 DIAGNOSIS — I87.1 COMPRESSION OF VEIN: ICD-10-CM

## 2025-01-29 DIAGNOSIS — M85.89 OSTEOPENIA OF MULTIPLE SITES: ICD-10-CM

## 2025-01-29 DIAGNOSIS — R42 DIZZINESS: ICD-10-CM

## 2025-01-29 DIAGNOSIS — G43.009 MIGRAINE WITHOUT AURA AND WITHOUT STATUS MIGRAINOSUS, NOT INTRACTABLE: ICD-10-CM

## 2025-01-29 DIAGNOSIS — G24.3 CERVICAL DYSTONIA: Primary | ICD-10-CM

## 2025-01-29 PROCEDURE — 77080 DXA BONE DENSITY AXIAL: CPT | Performed by: INTERNAL MEDICINE

## 2025-01-29 ASSESSMENT — PAIN SCALES - GENERAL: PAINLEVEL_OUTOF10: NO PAIN (0)
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
## 2025-01-29 NOTE — NURSING NOTE
"Chief Complaint   Patient presents with    RECHECK     /70 (BP Location: Right arm, Patient Position: Sitting, Cuff Size: Adult Regular)   Pulse 57   Resp 16   Ht 1.613 m (5' 3.5\")   Wt 49.6 kg (109 lb 6.4 oz)   SpO2 99%   BMI 19.08 kg/m    Jaqueline Perez    "

## 2025-01-30 ENCOUNTER — TELEPHONE (OUTPATIENT)
Dept: NEUROLOGY | Facility: CLINIC | Age: 55
End: 2025-01-30

## 2025-01-30 NOTE — TELEPHONE ENCOUNTER
Left Voicemail (1st Attempt) and Sent Mychart (1st Attempt) for the patient to call back and schedule the following:    Appointment type: Return Neurology-virtual  Provider: Rose  Return date: around 7/29/2025   Specialty phone number: [phone]  Additional appointment(s) needed: NA  Additonal Notes: 6 month follow up    Cele Rich on 1/30/2025 at 10:31 AM

## 2025-01-30 NOTE — TELEPHONE ENCOUNTER
Left Voicemail (1st Attempt) and Sent Mychart (1st Attempt) for the patient to call back and schedule the following:    Appointment type: Neurotoxin  Provider: Dr. Rose  Return date: March 28, 2025 at 9:00 am  Specialty phone number: [phone]  Additional appointment(s) needed: NA  Additonal Notes:     Spot is on hold for the pt, will need to manually sched

## 2025-02-12 ENCOUNTER — OFFICE VISIT (OUTPATIENT)
Dept: PLASTIC SURGERY | Facility: CLINIC | Age: 55
End: 2025-02-12
Attending: DERMATOLOGY
Payer: COMMERCIAL

## 2025-02-12 ENCOUNTER — PRE VISIT (OUTPATIENT)
Dept: PLASTIC SURGERY | Facility: CLINIC | Age: 55
End: 2025-02-12

## 2025-02-12 VITALS
HEIGHT: 64 IN | HEART RATE: 69 BPM | WEIGHT: 110 LBS | BODY MASS INDEX: 18.78 KG/M2 | DIASTOLIC BLOOD PRESSURE: 72 MMHG | OXYGEN SATURATION: 100 % | SYSTOLIC BLOOD PRESSURE: 106 MMHG

## 2025-02-12 DIAGNOSIS — L90.5 FACIAL SCAR: ICD-10-CM

## 2025-02-12 RX ORDER — CEFAZOLIN SODIUM 2 G/50ML
2 SOLUTION INTRAVENOUS SEE ADMIN INSTRUCTIONS
OUTPATIENT
Start: 2025-02-12

## 2025-02-12 RX ORDER — CEFAZOLIN SODIUM 2 G/50ML
2 SOLUTION INTRAVENOUS
OUTPATIENT
Start: 2025-02-12

## 2025-02-12 ASSESSMENT — PAIN SCALES - GENERAL: PAINLEVEL_OUTOF10: NO PAIN (0)
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
## 2025-02-12 NOTE — NURSING NOTE
"Chief Complaint   Patient presents with    Consult     Kay, is beng seen today for a consult regarding facial scar referred be Dr. Toth.       Vitals:    02/12/25 1031   BP: 106/72   BP Location: Left arm   Patient Position: Chair   Cuff Size: Adult Regular   Pulse: 69   SpO2: 100%   Weight: 49.9 kg (110 lb)   Height: 1.613 m (5' 3.5\")       Body mass index is 19.18 kg/m .      Maricruz Singer LPN    "
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
## 2025-02-13 ENCOUNTER — TELEPHONE (OUTPATIENT)
Dept: PLASTIC SURGERY | Facility: CLINIC | Age: 55
End: 2025-02-13

## 2025-02-13 NOTE — TELEPHONE ENCOUNTER
Left voicemail for patient regarding scheduling surgery with Dr. Posada    Provided direct contact line to discuss scheduling  P: [phone]      Adeola Borrero on 2/13/2025 at 12:52 PM

## 2025-02-17 ENCOUNTER — PATIENT OUTREACH (OUTPATIENT)
Dept: PLASTIC SURGERY | Facility: CLINIC | Age: 55
End: 2025-02-17
Payer: COMMERCIAL

## 2025-02-17 PROBLEM — L90.5 FACIAL SCAR: Status: ACTIVE | Noted: 2025-02-12

## 2025-02-17 NOTE — TELEPHONE ENCOUNTER
Pt called, we discussed surgery and recovery. She will reach back out to the surgery scheduler now that she has her questions answered, is looking for a surgery date in September time frame.

## 2025-02-19 ENCOUNTER — VIRTUAL VISIT (OUTPATIENT)
Dept: ONCOLOGY | Facility: CLINIC | Age: 55
End: 2025-02-19
Attending: GENETIC COUNSELOR, MS
Payer: COMMERCIAL

## 2025-02-19 DIAGNOSIS — Z80.0 FAMILY HISTORY OF MALIGNANT NEOPLASM OF COLON: ICD-10-CM

## 2025-02-19 DIAGNOSIS — Z80.3 FAMILY HISTORY OF MALIGNANT NEOPLASM OF BREAST: Primary | ICD-10-CM

## 2025-02-19 DIAGNOSIS — C43.9 MELANOMA OF SKIN (H): ICD-10-CM

## 2025-02-19 DIAGNOSIS — Z80.42 FAMILY HISTORY OF MALIGNANT NEOPLASM OF PROSTATE: ICD-10-CM

## 2025-02-19 PROCEDURE — 96041 GENETIC COUNSELING SVC EA 30: CPT | Mod: GT,95 | Performed by: GENETIC COUNSELOR, MS

## 2025-02-19 NOTE — LETTER
2025    Kay Ceja  [address]  [facility] [address]      Dear Kay,    It was a pleasure speaking with you over video on 2025. Here is a copy of the progress note from our discussion. If you have any additional questions, please feel free to call.    Referring Provider: Self-Referred    Presenting Information:   I met with Kay for her video genetic counseling visit, through the Cancer Risk Management Program, to discuss her personal history of melanoma and family history of breast, prostate, and colon cancer. Today we reviewed this history, cancer screening recommendations, and available genetic testing options.    Personal History:  Kay is a 54 year old year old female. She was diagnosed with basal cell carcinoma (BCC) at age 39 and melanoma at age 52 and 53; treatment for all skin cancers included excision. Kay reported environmental exposures of tanning bed use and excessive sun exposure.    She had her first menstrual period at age 12, she does not have children, and completed menopause at age 49. Kay has her ovaries, fallopian tubes and uterus in place, and she has had no ovarian cancer screening to date. She reports that she has not used hormone replacement therapy.      Her most recent mammogram in 2024 was normal. Kay began having colonoscopies at the age of 52. Her most recent colonoscopy in 2022 was normal and follow-up was recommended in 10 years. She does not regularly do any other cancer screening at this time.     Family History: (Please see scanned pedigree for detailed family history information)  Kay's mother was diagnosed with breast cancer at age 71.  A maternal uncle was diagnosed with prostate cancer at age 81.  Kay's maternal grandmother was diagnosed with suspected sarcoma behind her eye at age 49 and  at age 56.  Kay's maternal grandfather was diagnosed with and  from colon cancer at age 73.  He had 5 sisters that were all reported to  have breast cancer at unknown ages.  There is no reported paternal family history of cancer.  Her maternal ethnicity is Swedish, [place], German, and German [place]ian. Her paternal ethnicity is Irish, [place]ese, Fijian, and [place]. There is no known Ashkenazi Restorationist ancestry on either side of her family. There is no reported consanguinity.    Discussion:  Kay's personal and family history of melanoma, breast cancer, prostate cancer, and colon cancer is suggestive of a hereditary cancer syndrome.  We reviewed the features of sporadic, familial, and hereditary cancers. We discussed that mutations in either BRCA1 or BRCA2 could be possible hereditary explanations for her family history of cancer. Mutations in the BRCA1 or BRCA2 gene are known to cause Hereditary Breast and Ovarian Cancer Syndrome (HBOC). HBOC typically presents with multiple family members diagnosed with breast cancer before age 50 and/or ovarian cancer. Other cancer risks associated with HBOC include male breast cancer, prostate cancer, pancreatic cancer, and melanoma.   We discussed the natural history and genetics of hereditary cancer. A detailed handout regarding hereditary cancer, along with the other information we discussed, will be mailed to Kay at the end of our appointment today and can be found in the after visit summary. Topics included: inheritance pattern, cancer risks, cancer screening recommendations, and also risks, benefits and limitations of testing.  Based on her personal and family history, Kay's mother meets current National Comprehensive Cancer Network (NCCN) criteria for genetic testing of BRCA1/2 along with other high-penetrance breast cancer susceptibility genes (I.e. CDH1, PALB2, PTEN, and TP53), based on her personal and family history of breast cancer.  We discussed that there are additional genes that could cause increased risk for melanoma, breast cancer, prostate cancer, and/or colon cancer. As many of these genes present  with overlapping features in a family and accurate cancer risk cannot always be established based upon the pedigree analysis alone, it would be reasonable for Kay to consider panel genetic testing to analyze multiple genes at once.  We discussed that we ideally like to test someone affected with cancer first as they are the most informative to test. As such, we would recommend genetic testing for her mother first if possible.   Kay stated that she wanted to think about her genetic testing options and she will also talk to her mother about pursuing genetic testing to better help the family understand the risk for hereditary cancer. Kay will contact me should she become interested in genetic testing in the future and/or if her mother pursues genetic testing.  The possible outcomes of positive, negative, and uncertain were discussed with Kay. A detailed handout that explains this in detail was provided to the patient.  Medical Management: For Kay, we reviewed that the information from genetic testing may determine:  additional cancer screening for which Kay may qualify (i.e. mammogram and breast MRI, more frequent colonoscopies, more frequent dermatologic exams, etc.),  options for risk reducing surgeries Kay could consider (i.e. bilateral mastectomy, surgery to remove her ovaries and/or uterus, etc.),    and targeted chemotherapies if she were to develop certain cancers in the future (i.e. immunotherapy for individuals with Forde syndrome, PARP inhibitors, etc.).   These recommendations and possible targeted chemotherapies will be discussed in detail if genetic testing is completed.   Screening recommendations based on current personal and family history:  Based on her personal and family history, Kay has a 5% lifetime risk of developing breast cancer based on the ELEANOR model. Therefore, Kay does not meet current National Comprehensive Cancer Network (NCCN) guidelines for high risk breast screening, which is  offered to women with a 20% lifetime risk or higher. However, it is still important for Kay to continue with routine breast screening under the care of her physicians. Breast cancer screening is generally recommended to begin approximately 10 years younger than the earliest age of breast cancer diagnosis in the family, or at age 40, whichever comes first. In this family, screening may begin at age 40. Kay is encouraged to discuss breast screening with her physicians.   Other population cancer screening options, such as those recommended by the American Cancer Society and the National Comprehensive Cancer Network (NCCN), are also appropriate for Kay and her family. These screening recommendations may change if there are changes to Kay's personal and/or family history of cancer. Final screening recommendations should be made in consultation with each individual's primary care provider.  Of note, these recommendations may change based on any genetic testing done in the family.    Plan:  1) Today Kay elected to talk to her mother about genetic testing.  2) A copy of the after visit summary will be sent to Kay.  3) Kay will follow up with me should her mother pursue genetic testing and/or should she decide to pursue genetic testing herself.    I spent 60 minutes on the date of the encounter doing chart review, history and exam, documentation and further activities as noted above.    Obi Montenegro MS, [facility]  Licensed, Certified Genetic Counselor

## 2025-02-19 NOTE — PROGRESS NOTES
2025    Referring Provider: Self-Referred    Presenting Information:   I met with Kay for her video genetic counseling visit, through the Cancer Risk Management Program, to discuss her personal history of melanoma and family history of breast, prostate, and colon cancer. Today we reviewed this history, cancer screening recommendations, and available genetic testing options.    Personal History:  Kay is a 54 year old year old female. She was diagnosed with basal cell carcinoma (BCC) at age 39 and melanoma at age 52 and 53; treatment for all skin cancers included excision. Kay reported environmental exposures of tanning bed use and excessive sun exposure.    She had her first menstrual period at age 12, she does not have children, and completed menopause at age 49. Kay has her ovaries, fallopian tubes and uterus in place, and she has had no ovarian cancer screening to date. She reports that she has not used hormone replacement therapy.      Her most recent mammogram in 2024 was normal. Kay began having colonoscopies at the age of 52. Her most recent colonoscopy in 2022 was normal and follow-up was recommended in 10 years. She does not regularly do any other cancer screening at this time.     Family History: (Please see scanned pedigree for detailed family history information)  Kay's mother was diagnosed with breast cancer at age 71.  A maternal uncle was diagnosed with prostate cancer at age 81.  Kay's maternal grandmother was diagnosed with suspected sarcoma behind her eye at age 49 and  at age 56.  Kay's maternal grandfather was diagnosed with and  from colon cancer at age 73.  He had 5 sisters that were all reported to have breast cancer at unknown ages.  There is no reported paternal family history of cancer.  Her maternal ethnicity is Citizen of [place], Vietnamese, [place], and [place]. Her paternal ethnicity is [place], Italian, Canadian, and Vietnamese. There is no known Ashkenazi Presybeterian  ancestry on either side of her family. There is no reported consanguinity.    Discussion:  Kay's personal and family history of melanoma, breast cancer, prostate cancer, and colon cancer is suggestive of a hereditary cancer syndrome.  We reviewed the features of sporadic, familial, and hereditary cancers. We discussed that mutations in either BRCA1 or BRCA2 could be possible hereditary explanations for her family history of cancer. Mutations in the BRCA1 or BRCA2 gene are known to cause Hereditary Breast and Ovarian Cancer Syndrome (HBOC). HBOC typically presents with multiple family members diagnosed with breast cancer before age 50 and/or ovarian cancer. Other cancer risks associated with HBOC include male breast cancer, prostate cancer, pancreatic cancer, and melanoma.   We discussed the natural history and genetics of hereditary cancer. A detailed handout regarding hereditary cancer, along with the other information we discussed, will be mailed to Kay at the end of our appointment today and can be found in the after visit summary. Topics included: inheritance pattern, cancer risks, cancer screening recommendations, and also risks, benefits and limitations of testing.  Based on her personal and family history, Kay's mother meets current National Comprehensive Cancer Network (NCCN) criteria for genetic testing of BRCA1/2 along with other high-penetrance breast cancer susceptibility genes (I.e. CDH1, PALB2, PTEN, and TP53), based on her personal and family history of breast cancer.  We discussed that there are additional genes that could cause increased risk for melanoma, breast cancer, prostate cancer, and/or colon cancer. As many of these genes present with overlapping features in a family and accurate cancer risk cannot always be established based upon the pedigree analysis alone, it would be reasonable for Kay to consider panel genetic testing to analyze multiple genes at once.  We discussed that we ideally  like to test someone affected with cancer first as they are the most informative to test. As such, we would recommend genetic testing for her mother first if possible.   Kay stated that she wanted to think about her genetic testing options and she will also talk to her mother about pursuing genetic testing to better help the family understand the risk for hereditary cancer. Kay will contact me should she become interested in genetic testing in the future and/or if her mother pursues genetic testing.  The possible outcomes of positive, negative, and uncertain were discussed with Kay. A detailed handout that explains this in detail was provided to the patient.  Medical Management: For Kay, we reviewed that the information from genetic testing may determine:  additional cancer screening for which Kay may qualify (i.e. mammogram and breast MRI, more frequent colonoscopies, more frequent dermatologic exams, etc.),  options for risk reducing surgeries Kay could consider (i.e. bilateral mastectomy, surgery to remove her ovaries and/or uterus, etc.),    and targeted chemotherapies if she were to develop certain cancers in the future (i.e. immunotherapy for individuals with Forde syndrome, PARP inhibitors, etc.).   These recommendations and possible targeted chemotherapies will be discussed in detail if genetic testing is completed.   Screening recommendations based on current personal and family history:  Based on her personal and family history, Kay has a 5% lifetime risk of developing breast cancer based on the ELEANOR model. Therefore, Kay does not meet current National Comprehensive Cancer Network (NCCN) guidelines for high risk breast screening, which is offered to women with a 20% lifetime risk or higher. However, it is still important for Kay to continue with routine breast screening under the care of her physicians. Breast cancer screening is generally recommended to begin approximately 10 years younger than  the earliest age of breast cancer diagnosis in the family, or at age 40, whichever comes first. In this family, screening may begin at age 40. Kay is encouraged to discuss breast screening with her physicians.   Other population cancer screening options, such as those recommended by the American Cancer Society and the National Comprehensive Cancer Network (NCCN), are also appropriate for Kay and her family. These screening recommendations may change if there are changes to Kay's personal and/or family history of cancer. Final screening recommendations should be made in consultation with each individual's primary care provider.  Of note, these recommendations may change based on any genetic testing done in the family.    Plan:  1) Today Kay elected to talk to her mother about genetic testing.  2) A copy of the after visit summary will be sent to Kay.  3) Kay will follow up with me should her mother pursue genetic testing and/or should she decide to pursue genetic testing herself.    I spent 60 minutes on the date of the encounter doing chart review, history and exam, documentation and further activities as noted above.    Obi Montenegro MS, [facility]  Licensed, Certified Genetic Counselor      Virtual Visit Details    Type of service:  Video Visit     Originating Location (pt. Location): Home  Distant Location (provider location):  Off-site  Platform used for Video Visit: Bette

## 2025-02-19 NOTE — Clinical Note
"2/19/2025      Kay Ceja  [address]  [facility] [address]      Dear Colleague,    Thank you for referring your patient, Kay Ceja, to the [facility]. Please see a copy of my visit note below.    Virtual Visit Details    Type of service:  Video Visit     Originating Location (pt. Location): {video visit patient location:028346::\"Home\"}  {PROVIDER LOCATION On-site should be selected for visits conducted from your clinic location or adjoining [facility] hospital, academic office, or other nearby [facility] building. Off-site should be selected for all other provider locations, including home:[id number]}  Distant Location (provider location):  {virtual location provider:[id number]}  Platform used for Video Visit: {Virtual Visit Platforms:[id number]::\"[email or web address]\"}      Again, thank you for allowing me to participate in the care of your patient.        Sincerely,        Obi Montenegro GC    Electronically signed"

## 2025-02-19 NOTE — PATIENT INSTRUCTIONS
Assessing Cancer Risk  Cancer is a common diagnosis which impacts many families.  Individuals may develop cancer due to environmental factors (such as exposures and lifestyle), aging, genetic predisposition, or a combination of these factors.      Only about 5-10% of cancers are thought to be due to an inherited cancer susceptibility gene.    These families often have:  Several people with the same or related types of cancer  Cancers diagnosed at a young age (before age 50)  Individuals with more than one primary cancer  Multiple generations of the family affected with cancer    Comprehensive Breast and Gynecologic Cancer Panel  We each inherit two copies of every gene in our bodies: one from our mother, and one from our father. Each gene has a specific job to do.  When a gene has a mistake or  mutation  in it, it does not work like it should.     Some people may be candidates for genetic testing of more than one gene.  For these families, genetic testing using a cancer panel may be offered. These panels will test different genes at once known to increase the risk for breast, ovarian, uterine, and/or other cancers.    This handout will review common hereditary breast and gynecologic cancer syndromes. The genes that will be discussed in this handout are: GILMER, BRCA1, BRCA2, BRIP1, CDH1, CHEK2, MLH1, MSH2, MSH6, PMS2, EPCAM, PTEN, PALB2, RAD51C, RAD51D, and TP53.    The purpose of this handout is to serve as a brief summary of the breast and gynecologic cancer risk genes that have published clinical management guidelines for individuals who are found to carry a mutation. Inheriting a mutation does not mean a person will develop cancer, but it does significantly increase their risk above the general population risk.     ______________________________________________________________________________    Hereditary Breast and Ovarian Cancer Syndrome (BRCA1 and BRCA2)  A single mutation in one of the copies of BRCA1 or  BRCA2 increases the risk for breast and ovarian cancer, among others.  The risk for pancreatic cancer and melanoma may also be slightly increased in some families.  The chart below shows the chance that someone with a BRCA mutation would develop cancer in his or her lifetime1,2,3,4.       Lifetime Cancer Risks    General Population BRCA1  BRCA2   Breast  12% >60% >60%   Ovarian  1-2% 39-58% 13-29%   Prostate 12% 7-26% 19-61%   Male Breast 0.1% 0.2-1.2% 1.8-7.1%   Pancreas 1-2% Up to 5% 5-10%     A person s ethnic background is also important to consider, as individuals of Ashkenazi Amish ancestry have a higher chance of having a BRCA gene mutation.  There are three BRCA mutations that occur more frequently in this population.      Forde Syndrome (MLH1, MSH2, MSH6, PMS2, and EPCAM)  Currently five genes are known to cause Forde Syndrome: MLH1, MSH2, MSH6, PMS2, and EPCAM.  A single mutation in one of the Forde Syndrome genes increases the risk for colon, endometrial, ovarian, and stomach cancers.  Other cancers that occur less commonly in Forde Syndrome include urinary tract, skin, and brain cancers.  The chart below shows the chance that a person with Forde syndrome would develop cancer in his or her lifetime5.      Lifetime Cancer Risks    General Population Forde Syndrome   Colon 5% 10-61%   Endometrial 3% 13-57%   Ovarian 1-2% 1-38%   Stomach <1% 1-9%   *Cancer risk varies depending on Forde syndrome gene found      Cowden Syndrome (PTEN)  Cowden syndrome is a hereditary condition that increases the risk for breast, thyroid, endometrial, colon, and kidney cancer.  Cowden syndrome is caused by a mutation in the PTEN gene.  A single mutation in one of the copies of PTEN causes Cowden syndrome and increases cancer risk.  The chart below shows the chance that someone with a PTEN mutation would develop cancer in their lifetime6,7.  Other benign features seen in some individuals with Cowden syndrome include benign  skin lesions (facial papules, keratoses, lipomas), learning disability, autism, thyroid nodules, colon polyps, and larger head size.     Lifetime Cancer Risks    General Population Cowden   Breast 12% 40-60%*   Thyroid 1% Up to 38%   Renal 1-2% Up to 35%   Endometrial 3% Up to 28%   Colon 5% Up to 9%   Melanoma 2-3% Up to 6%   *Emerging data suggests the risk for breast cancer could be greater than 60%               Li-Fraumeni Syndrome (TP53)  Li-Fraumeni Syndrome (LFS) is a cancer predisposition syndrome caused by a mutation in the TP53 gene. A single mutation in one of the copies of TP53 increases the risk for multiple cancers. Individuals with LFS are at an increased risk for developing cancer at a young age. The lifetime risk for development of a LFS-associated cancer is 50% by age 30 and 90% by age 60.   Core Cancers: Sarcomas, Breast, Brain, Lung, Leukemias/Lymphomas, Adrenocortical carcinomas  Other Cancers: Gastrointestinal, Thyroid, Skin, Genitourinary       Hereditary Diffuse Gastric Cancer (CDH1)  Currently, one gene is known to cause hereditary diffuse gastric cancer (HDGC): CDH1.  Individuals with HDGC are at increased risk for diffuse gastric cancer and lobular breast cancer. Of people diagnosed with HDGC, 30-50% have a mutation in the CDH1 gene.  This suggests there are likely other genes that may cause HDGC that have not been identified yet.      Lifetime Cancer Risks    General Population HDGC   Diffuse Gastric  <1% ~80%   Breast 12% 41-60%       Additional Genes    GILMER  GILMER is a moderate-risk breast cancer gene. Women who have a mutation in GILMER can have between a 2-4 fold increased risk for breast cancer compared to the general population8. GILMER mutations have also been associated with increased risk for pancreatic cancer between 5-10%9. Individuals who inherit two GILMER mutations have a condition called ataxia-telangiectasia (AT).  This rare autosomal recessive condition affects the nervous system  and immune system, and is associated with progressive cerebellar ataxia beginning in childhood. Individuals with ataxia-telangiectasia often have a weakened immune system and have an increased risk for childhood cancers.    PALB2  Mutations in PALB2 have been shown to increase the risk of breast cancer up to 41-60% in some families; where individuals fall within this risk range is dependent upon family [id number]. PALB2 mutations have also been associated with increased risk for pancreatic cancer between 5-10%.  Individuals who inherit two PALB2 mutations--one from their mother and one from their father--have a condition called Fanconi Anemia.  This rare autosomal recessive condition is associated with short stature, developmental delay, bone marrow failure, and increased risk for childhood cancers.    CHEK2   CHEK2 is a moderate-risk breast cancer gene.  Women who have a mutation in CHEK2 have around a 2-4 fold increased risk for breast cancer compared to the general population, and this risk may be higher depending upon family history.11,12,13 The risk of colon cancer may be twice as high as the general population risk of colon cancer of 5%. Mutations in CHEK2 have also been shown to increase the risk of other cancers, including prostate, however these cancer risks are currently not well understood.    BRIP1, RAD51C and RAD51D  Mutations in RAD51C and RAD51D have been shown to increase the risk of ovarian cancer and breast cancer 14,. Mutations in BRIP1 have been shown to increase the risk of ovarian cancer and possibly female breast cancer 15 .       Lifetime Cancer Risk    General Population        BRIP1   RAD51C  RAD51D   Breast 12% Not well defined 20-40% 20-40%   Ovarian 1-2% 5-15% 10-15% 10-20%     ______________________________________________________________  Inheritance  All of the cancer syndromes reviewed above are inherited in an autosomal dominant pattern.  This means that if a parent has a mutation,  each of their children will have a 50% chance of inheriting that same mutation. Therefore, each child --male or female-- would have a 50% chance of being at increased risk for developing cancer.    Image obtained from Genetics Home Reference, 2013     Mutations in some genes can occur de tyrone, which means that a person s mutation occurred for the first time in them and was not inherited from a parent.  Now that they have the mutation, however, it can be passed on to future generations.    Genetic Testing  Genetic testing involves a blood test and will look for any harmful mutations that are associated with increased cancer risk.  If possible, it is recommended that the person(s) who has had cancer be tested before other family members.  That person will give us the most useful information about whether or not a specific gene is associated with the cancer in the family.    Results  There are three possible results of genetic testing:  Positive--a harmful mutation was identified in one or more of the genes  Negative--no mutations were identified in any of the genes tested  Variant of unknown significance--a variation in one of the genes was identified, but it is unclear how this impacts cancer risk in the family    Advantages and Disadvantages   There are advantages and disadvantages to genetic testing.    Advantages  May clarify your cancer risk  Can help you make medical decisions  May explain the cancers in your family  May give useful information to your family members (if you share your results)    Disadvantages  Possible negative emotional impact of learning about inherited cancer risk  Uncertainty in interpreting a negative test result in some situations  Possible genetic discrimination concerns (see below)    Genetic Information Nondiscrimination Act (SUSAN)  The Genetic Information Nondiscrimination Act of 2008 (SUSAN) is a federal law that protects individuals from health insurance or employment discrimination  based on a genetic test result alone (with some exceptions, including employers with fewer than 15 employees, and ).  Although rare, SUSAN  does not cover discrimination protections in terms of life insurance, long term care, or disability insurances.  Visit the [employer] [place] website to learn more.    Reducing Cancer Risk  All of the genes described in this handout have nationally recognized cancer screening guidelines that would be recommended for individuals who test positive.  In addition to increased cancer screening, surgeries may be offered or recommended to reduce cancer risk.  Recommendations are based upon an individual s genetic test result as well as their personal and family history of cancer.    Questions to Think About Regarding Genetic Testing:  What effect will the test result have on me and my relationship with my family members if I have an inherited gene mutation?  If I don t have a gene mutation?  Should I share my test results, and how will my family react to this news, which may also affect them?  Are my children ready to learn new information that may one day affect their own health?    Hereditary Cancer Resources    FORCE: Facing Our Risk of Cancer Empowered facingourrisk.org   Bright Pink bebrightpink.org   Li-Fraumeni Syndrome Association lfsassociation.org   PTEN World PTENworld.com   No stomach for cancer, Inc. nostomachforcancer.org   Stomach cancer relief network Scrnet.org   Collaborative Group of the Americas on Inherited Colorectal Cancer (CGA) cgaicc.com    Cancer Care cancercare.org   American Cancer Society (ACS) cancer.org   National Cancer [place] (NCI) cancer.gov     References  Stephanie Alexander PDP, Wilver S, Dhara DUBOIS, Mary Kay JE, Frank JL, Helen N, Bashir H, Alvaro O, Nicole A, Elva B, Arpita P, Joaquin S, Sofy SABA, Ac N, Mani E, Mason H, Sam E, Isela J, Mikayla J, Kylee B, Eliu H, Mathieu S, Filemon H,  Indigo H, Monet FARLEY, Madina OP. Average risks of breast and ovarian cancer associated with BRCA1 or BRCA2 mutations detected in case series unselected for family history: a combined analysis of 222 studies. Am J Hum Anabelle. 2003;72:1117-30.  Caryn N, Vero M, Altaf G.  BRCA1 and BRCA2 Hereditary Breast and Ovarian Cancer. Gene Reviews online. 2013.  Madan YC, Wilfrido S, Shadia G, Levine S. Breast cancer risk among male BRCA1 and BRCA2 mutation carriers. J Natl Cancer Inst. 2007;99:1811-4.  Dereck GRAY, Yoseph I, Manav J, Penny E, Bob ER, Rosemary F. Risk of breast cancer in male BRCA2 carriers. J Med Anabelle. 2010;47:710-1.  National Comprehensive Cancer Network. Clinical practice guidelines in oncology, colorectal cancer screening. Available online (registration required). 2015.  Parminder MH, Magdalena J, Hal J, Lamar LA, Rex MS, Eng C. Lifetime cancer risks in individuals with germline PTEN mutations. Clin Cancer Res. 2012;18:400-7.  Travis FLOREZ. Cowden Syndrome: A Critical Review of the Clinical Literature. J Anabelle . 2009:18:13-27.  Sathish A, Jj D, Keiko S, Julia P, Joni T, Fabi M, Rico B, Jajenelle H, Dipti R, Katerin K, Vicente L, Dereck GRAY, Sofy D, Robert DF, Damien MR, The Breast Cancer Susceptibility Collaboration (UK) & Renay GEORGE. GILMER mutations that cause ataxia-telangiectasia are breast cancer susceptibility alleles. Nature Genetics. 2006;38:873-875  Vadim N , Ino Y, Christal J, Carine L, Mark Anthony GM , Hi ML, Gallinger S, Stanley AG, Syngal S, Sadie ML, Austin J , Bianka R, Humberto SZ, Víctor JR, Chayo VE, Jose M, Vogelstein B, Guicho N, Karen RH, Kylie KW, and Marilyn AP. GILMER mutations in patients with hereditary pancreatic cancer. Cancer Discover. 2012;2:41-46  Isidro BENSON, et al. Breast-Cancer Risk in Families with Mutations in PALB2. NEJM. 2014; 371(6):497-506.  CHEK2 Breast Cancer Case-Control Consortium. CHEK2*1100delC and  susceptibility to breast cancer: A collaborative analysis involving 10,860 breast cancer cases and 9,065 controls from 10 studies. Am J Hum Anabelle, 74 (2004), pp. 7641-3688  Lynda T, Amadeo S, Katie K, et al. Spectrum of Mutations in BRCA1, BRCA2, CHEK2, and TP53 in Families at High Risk of Breast Cancer. EVELIA. 2006;295(12):1496-9777.   Na C, Trey D, Cathi LE, et al. Risk of breast cancer in women with a CHEK2 mutation with and without a family history of breast cancer. J Clin Oncol. 2011;29:7760-1722.  Abelardo H, Abhijeet E, Ram SJ, et al. Contribution of germline mutations in the RAD51B, RAD51C, and RAD51D genes to ovarian cancer in the population. J Clin Oncol. 2015;33(26):1134-2618. Doi:10.1200/JCO.2015.61.2408.  Emanuel GONZALEZ, Kavita HUANG, Arlen P, et al. Mutations in BRIP1 confer high risk of ovarian cancer. Juju Anabelle. 2011;43(11):3367-8136. doi:10.1038/ng.955.

## 2025-02-19 NOTE — NURSING NOTE
Current patient location: [address] [place], [place] [address]E S  [facility] [address]      Is the patient currently in the state of [place]? YES    Visit mode:VIDEO    If the visit is dropped, the patient can be reconnected by: VIDEO VISIT: Send to e-mail at: [email or web address]    Will anyone else be joining the visit? NO  (If patient encounters technical issues they should call [phone]    How would you like to obtain your AVS? MyChart    Are changes needed to the allergy or medication list? N/A    Reason for visit: Consult      Kinza WILSON

## 2025-03-10 NOTE — PROGRESS NOTES
[facility] Dermatology Note  Encounter Date: Mar 14, 2025  Office Visit     Dermatology Problem List:  FBSE 03/14/2025    # NUB, L paraspinal upper back  - s/p shave bx 03/14/2025    # Melanoma in situ, R shoulder, S/p MMS 11/21/22  # History of NMSC:  - BCC, R superior forehead S/p MMS 10/2014  - BCC, R temple, S/p MMS in [place] in 2012  # Multiple (>100) atypical nevi on trunk and extremities.   - Compound DN, mod atypia, left lateral upper chest, /p excision 3/6/24  - Compound dysplastic nevus, severe atypia, R back, S/p excision 6/21/23  - Collision mod DN and intradermal nevus, left leg, S/p Bx 9/2022  # Benign bx:  - Compound DN, right inner upper arm, S/p shave 1/19/24  - Intradermal melanocytic nevus, traumatized, scalp, S/p Bx 04/04/23  - Intradermal Melanocytic Nevus, right posterior shoulder, S/p shave 1/19/24  - Verrucoid keratosis, inflamed, L posterior calf, s/p bx 9/11/24  # AK's: LN2, field therapy states no reaction (per patient), but also documented as improved after field therapy with 5-FU 1-2 x/week. S/P cryotherapy 3/1/22, 4/4/23  # Flat warts, dorsal PIPs. Cryo 3/14/16, 3/1/22, 9/22/22  # Acne: tretinoin 0.025%, clindamycin 1% lotion   # Sebaceous hyperplasia. tretinoin 0.025% cream.  # Rash on back and buttocks, previously Bx 4/2015 as lichenoid dermatitis. Resolved with TMC 0.1% cream      Lesions to monitor:   - 1.5 mm medium brown macule, L breast.  ____________________________________________    Assessment & Plan:    # NUB, L paraspinal upper back  - Shave biopsy performed today. See procedure note below.     # LTM  - L breast, 1.5 mm medium brown macule. Dermoscopy with fingerprint pattern. Unchanged from last visit. Continue to monitor  - nasal sidewall 4 mm subtle tan macule. Dermoscopy is slightly cerebriform     # Continued cervical and inguinal lymphadenopathy  - present at last visit, given ongoing nature - will get US to characterize.    # Verrucoid  keratosis/inflamed SK, L posterior calf, s/p bx 9/11/24. Given symptomatic/irritated nature, proceed with cryo.  - Cryotherapy done today. See procedure note below.     # Acne. Flaring a bit more lately on tretinoin and BP wash.  - continue tretinoin 0.025% cream   - start clindamycin 1% lotion BID prn   - advised on BP recall through FDA    # Benign lesions - SKs, cherry angiomas  - No treatment required    # Multiple benign nevi   # Hx of DN  - Monitor for ABCDEs of melanoma   - Continue sun protection - recommend SPF 30 or higher with frequent application   - Return sooner if noticing changing or symptomatic lesions     # History of NMSC. No evidence of recurrent disease.  - Continue photoprotection - recommend SPF 30 or higher with frequent reapplication  - Continue yearly skin exams  - Advised to monitor for changing, non-healing, bleeding, painful, changing, or otherwise symptomatic lesions    # History of melanoma in situ  - no evidence of recurrent disease via inspection or palpation; all sites well-healed  - lymph node exam negative for lymphadenopathy  - continue photoprotection (such as SPF30+ sunscreen and proper use, UPF clothing, sun avoidance, tanning bed avoidance)  - monitor for ABCDE of melanoma; advised to bring any lesions of concern (new or changing over time) to medical attention    Procedures Performed:   - Shave biopsy: After discussion of benefits and risks including but not limited to bleeding, infection, scar, incomplete removal, recurrence, and non-diagnostic biopsy, written consent and photographs were obtained. The area was cleaned with isopropyl alcohol. < 1mL of 1% lidocaine with epinephrine was injected to obtain adequate anesthesia. A shave biopsy was performed. Hemostasis was achieved with aluminium chloride. Vaseline and a sterile dressing were applied. The patient tolerated the procedure and no complications were noted. The patient was provided with verbal and written post care  instructions.     Cryotherapy procedure note: After verbal consent and discussion of risks and benefits including but no limited to dyspigmentation/scar, blister, and pain, 1 lesion was(were) treated with 1-2mm freeze border for 2 cycles with liquid nitrogen. Post cryotherapy instructions were provided.     Follow-up: 6 month(s) in-person, or earlier for new or changing lesions    Staff and Scribe:     Scribe Disclosure:   I, Julia Russo, am serving as a scribe; to document services personally performed by Lisy Toth MD -based on data collection and the provider's statements to me.     Provider Disclosure:   The documentation recorded by the scribe accurately reflects the services I personally performed and the decisions made by me.    Lisy Toth MD    Department of Dermatology  [facility]: Phone: [phone], Fax: [phone]  3/14/2025       ____________________________________________    CC: Skin Check (Patient is here today for a skin exam )    HPI:  Ms. Kay Ceja is a(n) 54 year old female who presents today as a return patient for skin check    The patient reports that the lesion that she had behind her L calf at her last visit is still present after bx. She would like to discuss removal of this area today.   She shares that the pruritus she was experiencing previously has resolved.   She thinks she may have another SK.  She has  been using tretinoin and BPO wash for her acne, this drys her skin. She uses a mild bar soap on his skin. She is interested in discussing other tx for her acne.   Fhx of colon, breast, prostate cancers. Mother had breast cancer, some of mothers aunts and uncles passed away from lung cancer.     Patient is otherwise feeling well, without additional skin concerns.    Labs Reviewed:  Derm path from 09/11/2024  Final Diagnosis   Left posterior calf:  - Verrucoid keratosis,  inflamed - (see description)        Physical exam:  Vitals: There were no vitals taken for this visit.  GEN: This is a well developed, well-nourished female in no acute distress, in a pleasant mood.    SKIN: Total skin excluding the undergarment areas was performed. The exam included the head/face, neck, both arms, chest, back, abdomen, both legs, digits and/or nails.   - prior lesion on L nasal supratip, resolved.   - nasal sidewall 4 mm subtle tan macule. Dermoscopy is slightly cerebriform   - L paraspinal upper back, pink macule with lateral pigment  - L lateral chest, 1.1 cm even brown macule   - Multiple regular brown pigmented macules and papules are identified on the trunk and extremities. .   - Waxy stuck on papules and plaques on trunk and extremities.   - red macules on trunk and extremities   - No other lesions of concern on areas examined.   LYMPH: No supraclavicular or axillary lymphadenopathy.  - Continued cervical and inguinal lymphadenopathy     Medications:  Current Outpatient Medications   Medication Sig Dispense Refill    acetaZOLAMIDE (DIAMOX) 125 MG tablet Take 125mg bid starting day before altitude and then for 2-4 days while at altitude 30 tablet 1    Calcium Carbonate-Vit D-Min (CALCIUM 1200 PO) Take 1 tablet by mouth daily Reported on 3/23/2017      Fremanezumab-vfrm (AJOVY) 225 MG/1.5ML SOAJ Inject 225 mg subcutaneously every 30 days. 1.5 mL 11    Multiple Vitamins-Minerals (MULTIVITAMIN & MINERAL PO) Take 1 tablet by mouth every day      mupirocin (BACTROBAN) 2 % external ointment Apply topically 2 times daily. To nose when red/irritated as needed 22 g 1    naproxen (NAPROSYN) 500 MG tablet Take 1 tablet (500 mg) by mouth every 12 hours as needed for moderate pain 30 tablet 3    naratriptan (AMERGE) 2.5 MG tablet Take 1 tablet (2.5 mg) by mouth at onset of headache for migraine May repeat in 4 hours. Max 2 tablets/24 hours. 12 tablet 11    ondansetron (ZOFRAN ODT) 4 MG ODT tab Take 1  tablet (4 mg) by mouth every 8 hours as needed for nausea 10 tablet 0    tretinoin (RETIN-A) 0.025 % external cream Apply a pea-size amount to entire face nightly at bedtime. 45 g 11     Current Facility-Administered Medications   Medication Dose Route Frequency Provider Last Rate Last Admin    [START ON 3/28/2025] incobotulinumtoxin A (XEOMIN) 100 units injection 100 Units  100 Units Intramuscular Q90 Days           Past Medical History:   Patient Active Problem List   Diagnosis    Anxiety    Amenorrhea    Osteopenia    Senile sebaceous gland hyperplasia    Atypical nevi    Basal cell carcinoma of right forehead s/p mms 10-13-14    Pain in joint, pelvic region and thigh    Pain in right ankle    Peroneal tendinitis    Traumatic pneumothorax, initial encounter    Edema    Stress fracture of ankle    Migraine headache    Right wrist pain    Contracture of sternocleidomastoid muscle    TOS (thoracic outlet syndrome)    Cervical dystonia    Neck pain    Compression of vein    Facial scar     Past Medical History:   Diagnosis Date    Anxiety     Basal cell carcinoma     Insomnia     Migraines     Osteopenia     T score -1.9 hip and back 1/19/11    Vasomotor rhinitis     uses nasalcort        CC Lisy Toth MD  [address] [place] [address] on close of this encounter.

## 2025-03-14 ENCOUNTER — OFFICE VISIT (OUTPATIENT)
Dept: DERMATOLOGY | Facility: CLINIC | Age: 55
End: 2025-03-14
Payer: COMMERCIAL

## 2025-03-14 DIAGNOSIS — D49.2 NEOPLASM OF UNSPECIFIED BEHAVIOR OF BONE, SOFT TISSUE, AND SKIN: ICD-10-CM

## 2025-03-14 DIAGNOSIS — Z85.828 HISTORY OF NONMELANOMA SKIN CANCER: ICD-10-CM

## 2025-03-14 DIAGNOSIS — L70.0 ACNE VULGARIS: ICD-10-CM

## 2025-03-14 DIAGNOSIS — Z86.018 HISTORY OF DYSPLASTIC NEVUS: ICD-10-CM

## 2025-03-14 DIAGNOSIS — L82.0 INFLAMED SEBORRHEIC KERATOSIS: ICD-10-CM

## 2025-03-14 DIAGNOSIS — R59.1 LYMPHADENOPATHY: ICD-10-CM

## 2025-03-14 DIAGNOSIS — L82.1 SEBORRHEIC KERATOSES: ICD-10-CM

## 2025-03-14 DIAGNOSIS — D22.9 MULTIPLE BENIGN NEVI: ICD-10-CM

## 2025-03-14 DIAGNOSIS — Z85.820 HISTORY OF MELANOMA: ICD-10-CM

## 2025-03-14 DIAGNOSIS — D18.01 CHERRY ANGIOMA: ICD-10-CM

## 2025-03-14 DIAGNOSIS — Z12.83 SKIN CANCER SCREENING: Primary | ICD-10-CM

## 2025-03-14 PROCEDURE — 88342 IMHCHEM/IMCYTCHM 1ST ANTB: CPT | Mod: TC | Performed by: DERMATOLOGY

## 2025-03-14 PROCEDURE — 88342 IMHCHEM/IMCYTCHM 1ST ANTB: CPT | Mod: 26 | Performed by: STUDENT IN AN ORGANIZED HEALTH CARE EDUCATION/TRAINING PROGRAM

## 2025-03-14 PROCEDURE — 88305 TISSUE EXAM BY PATHOLOGIST: CPT | Mod: 26 | Performed by: STUDENT IN AN ORGANIZED HEALTH CARE EDUCATION/TRAINING PROGRAM

## 2025-03-14 PROCEDURE — 88305 TISSUE EXAM BY PATHOLOGIST: CPT | Mod: TC | Performed by: DERMATOLOGY

## 2025-03-14 RX ORDER — CLINDAMYCIN PHOSPHATE 10 UG/ML
LOTION TOPICAL 2 TIMES DAILY
Qty: 60 ML | Refills: 11 | Status: SHIPPED | OUTPATIENT
Start: 2025-03-14

## 2025-03-14 ASSESSMENT — PAIN SCALES - GENERAL: PAINLEVEL_OUTOF10: NO PAIN (0)

## 2025-03-14 NOTE — PATIENT INSTRUCTIONS
Limited number of voluntary recalls initiated after FDA testing of acne products for benzene; findings show a small number of products with elevated levels of benzene contamination  [3/11/2025] FDA is alerting the public and industry to the results of new agency testing of 95 acne products containing benzoyl peroxide for possible benzene contamination. FDA has concluded that a limited number of products should be recalled at the retail level; more than 90% of tested products had undetectable or extremely low levels of benzene.    FDA initiated independent testing following receipt of third-party testing results submitted to the agency that raised concerns about elevated levels of benzene in certain acne products containing benzoyl peroxide. FDA testing results indicate fewer products with benzene contamination than the third-party findings.    The companies listed below voluntarily agreed to take action to recall certain products (listed below) due to elevated levels of benzene. It is important to note the recalls are being conducted at the retail level, not the consumer level. This means retailers are instructed to remove products from store shelves and online marketplaces but does not specifically instruct consumers to take actions regarding products currently in their possession. Even with daily use of these products for decades, the risk of a person developing cancer because of exposure to benzene found in these products is very low.    Product              Lot number(s)   Expiration date  La Roche-Posay Effaclar Duo Dual Action Acne Treatment   [id number]   April 2025  [facility] Acne Control Cleanser      [id number]   September 2025  Proactiv Emergency Blemish Relief Cream Benzoyl Peroxide 5%  [id number]; [id number]  October 2025  Proactiv Skin Smoothing Exfoliator      [id number]   July 2025  MD Benzoyl Peroxide Acne Lotion     [id number]   March 2025  [facility] Tinted Acne Treatment Cream     [id number]   March  2026      Additionally, the  of another benzoyl peroxide acne product, Zapzyt Acne Treatment Gel, agreed to voluntarily recall this product due to the elevated level of benzene found during its own testing.    Visit FDA s drug recalls page or search FDA s recalls database for additional information and updates on product recalls.    Benzene is a chemical formed in nature and from human activities. It is also a natural part of crude oil, gasoline and cigarette smoke. Exposure to benzene can increase the risk of cancer. Further information regarding benzene in drugs can be found here.    FDA s testing used validated test methods and assessed all benzoyl peroxide products identified by third-party laboratories as having elevated benzene levels that FDA was able to purchase at the time of testing, as well as additional marketed products. FDA tested a total of 95 acne treatment products containing benzoyl peroxide and found six products with elevated levels of benzene. FDA notes that some of these six products are approaching their expiration dates and recommends consumers check their products and throw away products that are beyond their expiration date.      FDA intends to publish the full results of its testing, including data and information on testing methods, in one or more peer-reviewed journals in the coming months.     FDA continues to underscore and remind manufacturers, distributors, repackagers and importers that they are responsible for the safety and quality of their products. FDA requires manufacturers to evaluate and test for possible contaminants in their products to ensure they meet specifications and are free from harmful contamination, including benzene.     FDA has continued to raise concern that use of unvalidated testing methods by third-party laboratories can produce inaccurate results leading to consumer confusion. Specifically, such methods may result in much higher reported levels  of contaminants such as benzene than are actually present in tested products. It is critical that third-party laboratories reporting their results to consumers use validated methods so their results are reliable.             Checking for Skin Cancer  You can help find cancer early by checking your skin each month. There are 3 main kinds of skin cancer: melanoma, basal cell carcinoma, and squamous cell carcinoma. Doing monthly skin checks is the best way to find new marks, sores, or skin changes. Follow these instructions for checking your skin.   The ABCDEs of checking moles for melanoma   Check your moles or growths for signs of melanoma using ABCDE:   Asymmetry: The sides of the mole or growth don t match.  Border: The edges are ragged, notched, or blurred.  Color: The color within the mole or growth varies. It could be black, brown, tan, white, or shades of red, gray, or blue.  Diameter: The mole or growth is larger than   inch or 6 mm (size of a pencil eraser).  Evolving: The size, shape, texture, or color of the mole or growth is changing.     ABCDE's of moles on light skin.        ABCDE's of moles on dark skin may be harder to identify.     Checking for other types of skin cancer  Basal cell carcinoma or squamous cell carcinoma cause symptoms like:     A spot or mole that looks different from all other marks on your skin  Changes in how an area feels, such as itching, tenderness, or pain  Changes in the skin's surface, such as oozing, bleeding, or scaliness  A sore that doesn't heal  New swelling, redness, or spread of color beyond the border of a mole    Who s at risk?  Anyone of any skin color can get skin cancer. But you're at greater risk if you have:   Fair skin that freckles easily and burns instead of tanning  Light-colored or red hair  Light-colored eyes  Many moles or abnormal moles on your skin  A long history of unprotected exposure to sunlight or tanning beds  A history of many blistering sunburns  as a child or teen  A family history of skin cancer  Been exposed to radiation or chemicals  A weakened immune system  Been exposed to arsenic  If you've had skin cancer in the past, you're at high risk of having it again.   How to check your skin  Do your monthly skin checkups in front of a full-length mirror. Use a room with good lighting so it's easier to see. Use a hand mirror to look at hard-to-see places like your buttocks and back. You can also have a trusted friend or family member help you with these checks. Check every part of your body, including your:   Head (ears, face, neck, and scalp)  Torso (front, back, sides, and under breasts)  Arms (tops, undersides, and armpits)  Hands (palms, backs, and fingers, including under the nails)  Lower back, buttocks, and genitals  Legs (front, back, and sides)  Feet (tops, soles, toes, including under the nails, and between toes)  Watch for new spots on your skin or a spot that's changing in color, shape, size.   If you have a lot of moles, take digital photos of them each month. Make sure to take photos both up close and from a distance. These can help you see if any moles change over time.   Know your skin  Most skin changes aren't cancer. But if you see any changes in your skin, call your healthcare provider right away. Only they can tell you if a change is a problem. If you have skin cancer, seeing your provider can be the first step to getting the treatment that could save your life.   [employer] last reviewed this educational content on 10/1/2021    [id number] [employer]. All rights reserved. This information is not intended as a substitute for professional medical care. Always follow your healthcare professional's instructions.     Wound Care After a Biopsy    What is a skin biopsy?  A skin biopsy allows the doctor to examine a very small piece of tissue under the microscope to determine the diagnosis and the best treatment for the skin condition.  A local anesthetic (numbing medicine) is injected with a very small needle into the skin area to be tested. A small piece of skin is taken from the area. Sometimes a suture (stitch) is used.     What are the risks of a skin biopsy?  I will experience scar, bleeding, swelling, pain, crusting and redness. I may experience incomplete removal or recurrence. Risks of this procedure are excessive bleeding, bruising, infection, nerve damage, numbness, thick (hypertrophic or keloidal) scar and non-diagnostic biopsy.    How should I care for my wound for the first 24 hours?  Keep the wound dry and covered for 24 hours  If it bleeds, hold direct pressure on the area for 15 minutes. If bleeding does not stop, call us or go to the emergency room  Avoid strenuous exercise the first 1-2 days or as your doctor instructs you    How should I care for the wound after 24 hours?  After 24 hours, remove the bandage  You may bathe or shower as normal  If you had a scalp biopsy, you can shampoo as usual and can use shower water to clean the biopsy site daily  Clean the wound once a day with gentle soap and water  Do not scrub, be gentle  Apply white petroleum/Vaseline after cleaning the wound with a cotton swab or a clean finger, and keep the site covered with a Bandaid /bandage. Bandages are not necessary with a scalp biopsy  If you are unable to cover the site with a Bandaid /bandage, re-apply ointment 2-3 times a day to keep the site moist. Moisture will help with healing  Avoid strenuous activity for first 1-2 days  Avoid lakes, rivers, pools, and oceans until the stitches are removed or the site is healed    How do I clean my wound?  Wash hands thoroughly with soap or use hand  before all wound care  Clean the wound with gentle soap and water  Apply white petroleum/Vaseline  to wound after it is clean  Replace the Bandaid /bandage to keep the wound covered for the first few days or as instructed by your doctor  If you had a  scalp biopsy, warm shower water to the area on a daily basis should suffice    What should I use to clean my wound?   Cotton-tipped applicators (Qtips )  White petroleum jelly (Vaseline ). Use a clean new container and use Q-tips to apply.  Bandaids  as needed  Gentle soap     How should I care for my wound long term?  Do not get your wound dirty  Keep up with wound care for one week or until the area is healed.  A small scab will form and fall off by itself when the area is completely healed. The area will be red and will become pink in color as it heals. Sun protection is very important for how your scar will turn out. Sunscreen with an SPF 30 or greater is recommended once the area is healed.  You should have some soreness but it should be mild and slowly go away over several days. Talk to your doctor about using tylenol for pain,    When should I call my doctor?  If you have increased:   Pain or swelling  Pus or drainage (clear or slightly yellow drainage is ok)  Temperature over 100F  Spreading redness or warmth around wound    When will I hear about my results?  The biopsy results can take 2 weeks to come back.  Your results will automatically release to [employer] before your provider has even reviewed them.  The clinic will call you with the results, send you a [employer] message, or have you schedule a follow-up clinic or phone time to discuss the results.  Contact our clinics if you do not hear from us in 2 weeks.    Who should I call with questions?  [facility]: [phone]  [facility]: [phone]  For urgent needs outside of business hours call the [facility] at [phone] and ask for the dermatology resident on call   Cryotherapy    What is it?  Use of a very cold liquid, such as liquid nitrogen, to freeze and destroy abnormal skin cells that need to be removed    What should I expect?  Tenderness and redness  A small blister that  might grow and fill with dark purple blood. There may be crusting.  More than one treatment may be needed if the lesions do not go away.    How do I care for the treated area?  Gently wash the area with your hands when bathing.  Use a thin layer of Vaseline to help with healing. You may use a Band-Aid.   The area should heal within 7-10 days and may leave behind a pink or lighter color.   Do not use an antibiotic or Neosporin ointment.   You may take acetaminophen (Tylenol) for pain.     Call your doctor if you have:  Severe pain  Signs of infection (warmth, redness, cloudy yellow drainage, and or a bad smell)  Questions or concerns    Who should I call with questions?      [facility]: [phone]      [facility]: [phone]      For urgent needs outside of business hours call the [facility] at [phone] and ask for the dermatology resident on call
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
## 2025-03-14 NOTE — NURSING NOTE
Lidocaine-epinephrine 1-1:588838 % injection   0.25mL once for one use, starting 3/14/2025 ending 3/14/2025,  2mL disp, R-0, injection  Injected by Lucina Elizalde RN

## 2025-03-14 NOTE — NURSING NOTE
Chief Complaint   Patient presents with    Skin Check     Patient is here today for a skin exam      Gina GONZALEZ CMA
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
## 2025-03-24 ENCOUNTER — OFFICE VISIT (OUTPATIENT)
Dept: FAMILY MEDICINE | Facility: CLINIC | Age: 55
End: 2025-03-24
Attending: FAMILY MEDICINE
Payer: COMMERCIAL

## 2025-03-24 ENCOUNTER — TELEPHONE (OUTPATIENT)
Dept: NEUROLOGY | Facility: CLINIC | Age: 55
End: 2025-03-24

## 2025-03-24 VITALS
SYSTOLIC BLOOD PRESSURE: 109 MMHG | HEIGHT: 64 IN | WEIGHT: 110 LBS | HEART RATE: 75 BPM | BODY MASS INDEX: 18.78 KG/M2 | DIASTOLIC BLOOD PRESSURE: 71 MMHG

## 2025-03-24 DIAGNOSIS — M81.0 AGE-RELATED OSTEOPOROSIS WITHOUT CURRENT PATHOLOGICAL FRACTURE: Primary | ICD-10-CM

## 2025-03-24 PROCEDURE — 3074F SYST BP LT 130 MM HG: CPT | Performed by: FAMILY MEDICINE

## 2025-03-24 PROCEDURE — 99213 OFFICE O/P EST LOW 20 MIN: CPT | Performed by: FAMILY MEDICINE

## 2025-03-24 PROCEDURE — 1126F AMNT PAIN NOTED NONE PRSNT: CPT | Performed by: FAMILY MEDICINE

## 2025-03-24 PROCEDURE — 3078F DIAST BP <80 MM HG: CPT | Performed by: FAMILY MEDICINE

## 2025-03-24 PROCEDURE — 99215 OFFICE O/P EST HI 40 MIN: CPT | Performed by: FAMILY MEDICINE

## 2025-03-24 RX ORDER — ALENDRONATE SODIUM 70 MG/1
70 TABLET ORAL
Qty: 12 TABLET | Refills: 3 | Status: SHIPPED | OUTPATIENT
Start: 2025-03-24

## 2025-03-24 RX ORDER — VITAMIN B COMPLEX
TABLET ORAL DAILY
COMMUNITY

## 2025-03-24 ASSESSMENT — PAIN SCALES - GENERAL: PAINLEVEL_OUTOF10: NO PAIN (0)

## 2025-03-24 NOTE — TELEPHONE ENCOUNTER
Retail Pharmacy Prior Authorization Team   Phone: [phone]      PA Initiation    Medication: AJOVY 225 MG/1.5ML SC SOAJ  Insurance Company: [employer] - Phone [phone] Fax [phone]  Pharmacy Filling the Rx: [id number] - [place], [place] - [address]  Filling Pharmacy Phone: [phone]  Filling Pharmacy Fax: [phone]  Start Date: 3/24/2025    Completed over the phone with rep.  She stated that I will get notified via phone on the determination.  Rep provided CASE NUMBER [id number].      24 hour or less for the turn around time.

## 2025-03-24 NOTE — PATIENT INSTRUCTIONS
Start alendronate 70 mg/wk    See me in 3-4 months for well exam     Patient should take 1200mg of calcium/day in divided doses and vitamin D3 1000IU/day.      Weight bearing exercise  30 min/day 5x/wk

## 2025-03-24 NOTE — TELEPHONE ENCOUNTER
Prior Authorization Retail Medication Request     Medication/Dose: Ajovy 225 mg every 30 days,   Diagnosis and ICD code (if different than what is on RX):    New/renewal/insurance change PA/secondary ins. PA:  Previously Tried and Failed:   Sumatriptan, naratriptan, naproxen..-wellbutrin, luvax, prozac, zolft tried in the past and did not like how they make her feel. Family history of bipolar and saw family members detrimental effect from psychotropic meds. Would avoid BBB with history of depression and borderline low blood pressure  Discussed a trial of topiramate for migraine prevention.      Rationale:  migraine     Insurance   Primary: [employer]  Insurance ID:  [id number]

## 2025-03-25 NOTE — TELEPHONE ENCOUNTER
I called [employer] - Contacted [employer] and is a resolved claim in the system and the claim is now paid.    Rx number [id number] at [id number] CASE NUMBER [id number]  has been completed.

## 2025-03-27 ENCOUNTER — ANCILLARY PROCEDURE (OUTPATIENT)
Dept: ULTRASOUND IMAGING | Facility: CLINIC | Age: 55
End: 2025-03-27
Attending: DERMATOLOGY
Payer: COMMERCIAL

## 2025-03-27 DIAGNOSIS — R59.1 LYMPHADENOPATHY: ICD-10-CM

## 2025-03-27 PROCEDURE — 76882 US LMTD JT/FCL EVL NVASC XTR: CPT | Mod: RT | Performed by: RADIOLOGY

## 2025-03-27 PROCEDURE — 76536 US EXAM OF HEAD AND NECK: CPT | Mod: GC | Performed by: RADIOLOGY

## 2025-04-14 ENCOUNTER — TELEPHONE (OUTPATIENT)
Dept: NEUROLOGY | Facility: CLINIC | Age: 55
End: 2025-04-14
Payer: COMMERCIAL

## 2025-04-14 NOTE — TELEPHONE ENCOUNTER
Sent Mychart (1st Attempt) for the patient to call back and schedule the following:    Appointment type: Neurotoxin  Provider: Rose  Return date: 6/20/25 @11 am  Specialty phone number: [phone]  Additional appointment(s) needed: NA  Additonal Notes: reschedule 7/18/25 appt to 6/20/25 per provider.     Mirna Crabtree on 4/14/2025 at 2:30 PM

## 2025-06-08 SDOH — HEALTH STABILITY: PHYSICAL HEALTH: ON AVERAGE, HOW MANY DAYS PER WEEK DO YOU ENGAGE IN MODERATE TO STRENUOUS EXERCISE (LIKE A BRISK WALK)?: 6 DAYS

## 2025-06-08 SDOH — HEALTH STABILITY: PHYSICAL HEALTH: ON AVERAGE, HOW MANY MINUTES DO YOU ENGAGE IN EXERCISE AT THIS LEVEL?: 40 MIN

## 2025-06-08 ASSESSMENT — SOCIAL DETERMINANTS OF HEALTH (SDOH): HOW OFTEN DO YOU GET TOGETHER WITH FRIENDS OR RELATIVES?: THREE TIMES A WEEK

## 2025-06-09 ENCOUNTER — OFFICE VISIT (OUTPATIENT)
Dept: FAMILY MEDICINE | Facility: CLINIC | Age: 55
End: 2025-06-09
Attending: FAMILY MEDICINE
Payer: COMMERCIAL

## 2025-06-09 VITALS
BODY MASS INDEX: 18.64 KG/M2 | WEIGHT: 109.2 LBS | HEIGHT: 64 IN | DIASTOLIC BLOOD PRESSURE: 70 MMHG | HEART RATE: 62 BPM | SYSTOLIC BLOOD PRESSURE: 107 MMHG

## 2025-06-09 DIAGNOSIS — Z23 ENCOUNTER FOR IMMUNIZATION: ICD-10-CM

## 2025-06-09 DIAGNOSIS — T70.20XA EFFECTS OF HIGH ALTITUDE, INITIAL ENCOUNTER: ICD-10-CM

## 2025-06-09 DIAGNOSIS — M25.561 CHRONIC PAIN OF RIGHT KNEE: ICD-10-CM

## 2025-06-09 DIAGNOSIS — D03.61 MELANOMA IN SITU OF RIGHT UPPER EXTREMITY INCLUDING SHOULDER (H): ICD-10-CM

## 2025-06-09 DIAGNOSIS — M81.0 AGE-RELATED OSTEOPOROSIS WITHOUT CURRENT PATHOLOGICAL FRACTURE: ICD-10-CM

## 2025-06-09 DIAGNOSIS — G89.29 CHRONIC PAIN OF RIGHT KNEE: ICD-10-CM

## 2025-06-09 DIAGNOSIS — M24.551 RIGHT HIP FLEXOR TIGHTNESS: ICD-10-CM

## 2025-06-09 DIAGNOSIS — Z00.00 ROUTINE GENERAL MEDICAL EXAMINATION AT A HEALTH CARE FACILITY: Primary | ICD-10-CM

## 2025-06-09 PROCEDURE — 3078F DIAST BP <80 MM HG: CPT | Performed by: FAMILY MEDICINE

## 2025-06-09 PROCEDURE — 99396 PREV VISIT EST AGE 40-64: CPT | Performed by: FAMILY MEDICINE

## 2025-06-09 PROCEDURE — 3074F SYST BP LT 130 MM HG: CPT | Performed by: FAMILY MEDICINE

## 2025-06-09 PROCEDURE — 1125F AMNT PAIN NOTED PAIN PRSNT: CPT | Performed by: FAMILY MEDICINE

## 2025-06-09 PROCEDURE — 99214 OFFICE O/P EST MOD 30 MIN: CPT | Performed by: FAMILY MEDICINE

## 2025-06-09 RX ORDER — ACETAZOLAMIDE 125 MG/1
TABLET ORAL
Qty: 30 TABLET | Refills: 1 | Status: SHIPPED | OUTPATIENT
Start: 2025-06-09

## 2025-06-09 ASSESSMENT — ANXIETY QUESTIONNAIRES
2. NOT BEING ABLE TO STOP OR CONTROL WORRYING: NOT AT ALL
1. FEELING NERVOUS, ANXIOUS, OR ON EDGE: NOT AT ALL
3. WORRYING TOO MUCH ABOUT DIFFERENT THINGS: NOT AT ALL
6. BECOMING EASILY ANNOYED OR IRRITABLE: NOT AT ALL
IF YOU CHECKED OFF ANY PROBLEMS ON THIS QUESTIONNAIRE, HOW DIFFICULT HAVE THESE PROBLEMS MADE IT FOR YOU TO DO YOUR WORK, TAKE CARE OF THINGS AT HOME, OR GET ALONG WITH OTHER PEOPLE: NOT DIFFICULT AT ALL
GAD7 TOTAL SCORE: 0
GAD7 TOTAL SCORE: 0
5. BEING SO RESTLESS THAT IT IS HARD TO SIT STILL: NOT AT ALL
7. FEELING AFRAID AS IF SOMETHING AWFUL MIGHT HAPPEN: NOT AT ALL

## 2025-06-09 ASSESSMENT — PATIENT HEALTH QUESTIONNAIRE - PHQ9
SUM OF ALL RESPONSES TO PHQ QUESTIONS 1-9: 0
5. POOR APPETITE OR OVEREATING: NOT AT ALL

## 2025-06-09 ASSESSMENT — PAIN SCALES - GENERAL: PAINLEVEL_OUTOF10: MILD PAIN (2)

## 2025-06-09 NOTE — PROGRESS NOTES
Preventive Care Visit  [facility]  Carmella Miles MD PhD, Family Medicine  Jun 9, 2025      Assessment & Plan     Effects of high altitude, initial encounter  She is going on a climbing trip to [place] so I renewed her Diamox  - acetaZOLAMIDE (DIAMOX) 125 MG tablet  Dispense: 30 tablet; Refill: 1    Encounter for immunization  She is due for her pneumonia shot.  I reminded her about Shingrix.  - PNEUMOCOCCAL 20 VALENT CONJUGATE (PREVNAR 20)    Chronic pain of right knee  She has some pain in her right knee but the exam is unremarkable.  Will have her see physical therapy and if persists will have her see sports medicine  - Physical Therapy  Referral    Right hip flexor tightness  As above her symptom is that she feels that she cannot fully extend and flex that right hip.  Exam was unremarkable again but will have her see PT  - Physical Therapy  Referral    Routine general medical examination at a health care facility  She has had recent blood work all which was normal so I did not do that today her mammogram is due in September, we did do the Pneumovax 20 today, I reminded her about Shingrix, Pap is due in 2027, colonoscopy is due in probably 8 to 10 years in 20 29-20 30.    Osteoporosis  Her DEXA was in January 2025 we have reviewed that it did show a T-score of -2.5 at the hip.  She was started on alendronate, is not having any side effects.  Will continue with this.  We did discuss calcium.    Melanoma in situ of right upper extremity.  She is skin checks 2 times a year by Derm.  She does wear sun screen     Patient has been advised of split billing requirements and indicates understanding: Yes        Counseling  Appropriate preventive services were addressed with this patient via screening, questionnaire, or discussion as appropriate for fall prevention, nutrition, physical activity, Tobacco-use cessation, social engagement, weight loss and  cognition.  Checklist reviewing preventive services available has been given to the patient.  Reviewed patient's diet, addressing concerns and/or questions.       Follow-up  Return in about 53 weeks (around 6/15/2026) for Annual Wellness Visit.    Estefany Villanueva is a 54 year old, presenting for the following:  Physical        HPI  1. PM  Gr0  - LMP 8/2021 - no vaginal bleeding, no discharge  Or itching or burning  - last pap was 1/2022 normal - no sexual problems - no hot flashes - no HT    Mammogram 6/2024    +FHx breast cancer      2. Migraines   - using Ajovy monthly- helps a lot - last migraine  2-3 months ago and more subtle   -      3.  Thoracic outlet syndrome sees Dr Rose  neurology  - was diagnosed with thoracic outlet syndrome  and getting Botox- started 10 wks ago  - gets vestibular, propioceptive symptoms      3. Osteoporosis last DXA was 1/2025  - started fosamax 3/2025  - no side effects   Lumbar Spine  T-score -1.5 ., BMD is 0.998 g/cm2.      Left femoral neck  T-score -2.5      Right femoral neck  T-score -2.1      Left Total hip  T-score -2.1 , BMD is 0.743 g/cm2.     Right total hip  T-score -1.8, BMD is 0.775 g/cm2.     .  Interval change 3  There was no change at the areas of interest compared to the prior study considering historical least significant change data for this DXA machine.        4.  Anxiety - manageable - no panic attacks - no meds      5. Hx basal cell - gets skin check 2x/yr -  wears sun screen - see in March      6. Hx of pneumothorax -  10/2021 still some tenderness anterior rib area -when runs feels has less strength - did see pulmonology in 2/2022  Post pneumothorax and was told no residual   No wheezing     7. Hx of mountain sickness - going back packing - at 10,000 feet - wants prophylaxis    8. Right knee pain -feels swollen and uncomfortable at times - no injury - no self treatment      9.  right hip tightness  - occurs when tries to fully extend right leg - mainly with  running.          Health maintenance  Tetanus: 2024  Mammogram  scheduled for Sept   Pap - last 1/2022  Colonoscopy - 2021 no polyps    +FHx ZAIRA Harrington  Due             Advance Care Planning      Has this done with her         6/8/2025   General Health   How would you rate your overall physical health? Good   Feel stress (tense, anxious, or unable to sleep) Only a little   (!) STRESS CONCERN      6/8/2025   Nutrition   Three or more servings of calcium each day? Yes   Diet: Vegetarian/vegan   How many servings of fruit and vegetables per day? 4 or more   How many sweetened beverages each day? 0-1         6/8/2025   Exercise   Days per week of moderate/strenous exercise 6 days   Average minutes spent exercising at this level 40 min         6/8/2025   Social Factors   Frequency of gathering with friends or relatives Three times a week   Worry food won't last until get money to buy more No   Food not last or not have enough money for food? No   Do you have housing? (Housing is defined as stable permanent housing and does not include staying outside in a car, in a tent, in an abandoned building, in an overnight shelter, or couch-surfing.) Yes   Are you worried about losing your housing? No   Lack of transportation? No   Unable to get utilities (heat,electricity)? No         6/8/2025   Fall Risk   Fallen 2 or more times in the past year? No   Trouble with walking or balance? Yes           6/8/2025   Dental   Dentist two times every year? Yes         Today's PHQ-2 Score:       6/9/2025     8:24 AM   PHQ-2 ( 1999 [employer])   Q1: Little interest or pleasure in doing things 0   Q2: Feeling down, depressed or hopeless 0   PHQ-2 Score 0           6/8/2025   Substance Use   Alcohol more than 3/day or more than 7/wk Not Applicable   Do you use any other substances recreationally? No     Social History     Tobacco Use    Smoking status: Former     Current packs/day: 0.00     Average packs/day: 0.2 packs/day for 5.0 years  (1.0 ttl pk-yrs)     Types: Cigarettes     Start date: 1994     Quit date: 1999     Years since quittin.7    Smokeless tobacco: Never   Vaping Use    Vaping status: Never Used   Substance Use Topics    Alcohol use: No    Drug use: No           2025   LAST FHS-7 RESULTS   1st degree relative breast or ovarian cancer Yes   Any relative bilateral breast cancer No   Any male have breast cancer No   Any ONE woman have BOTH breast AND ovarian cancer No   Any woman with breast cancer before 50yrs No   2 or more relatives with breast AND/OR ovarian cancer No   2 or more relatives with breast AND/OR bowel cancer No   She has talked to someone about this and she is not recommended for testing      Mammogram Screening - Mammogram every 1-2 years updated in Health Maintenance based on mutual decision making          2025   One time HIV Screening   Previous HIV test? No         2025   STI Screening   New sexual partner(s) since last STI/HIV test? No     History of abnormal Pap smear: No - age 30- 64 PAP with HPV every 5 years recommended        Latest Ref Rng & Units 2022     9:15 AM 2018     4:30 PM 2018     9:44 AM   PAP / HPV   PAP  Negative for Intraepithelial Lesion or Malignancy (NILM)      PAP (Historical)    NIL    HPV 16 DNA Negative Negative  Negative     HPV 18 DNA Negative Negative  Negative     Other HR HPV Negative Negative  Negative       ASCVD Risk   The 10-year ASCVD risk score (Jaylan SOTO, et al., 2019) is: 0.9%    Values used to calculate the score:      Age: 54 years      Sex: Female      Is Non- : No      Diabetic: No      Tobacco smoker: No      Systolic Blood Pressure: 107 mmHg      Is BP treated: No      HDL Cholesterol: 68 mg/dL      Total Cholesterol: 160 mg/dL           Reviewed and updated as needed this visit by Provider                          Review of Systems  Constitutional, HEENT, cardiovascular, pulmonary, GI, ,  "musculoskeletal, neuro, skin, endocrine and psych systems are negative, except as otherwise noted.     Objective    Exam  /70   Pulse 62   Ht 1.613 m (5' 3.5\")   Wt 49.5 kg (109 lb 3.2 oz)   BMI 19.04 kg/m     Estimated body mass index is 19.04 kg/m  as calculated from the following:    Height as of this encounter: 1.613 m (5' 3.5\").    Weight as of this encounter: 49.5 kg (109 lb 3.2 oz).    Physical Exam  GENERAL: alert and no distress  EYES: Eyes grossly normal to inspection, PERRL and conjunctivae and sclerae normal  HENT: ear canals and TM's normal, nose and mouth without ulcers or lesions  NECK: no adenopathy, no asymmetry, masses, or scars  RESP: lungs clear to auscultation - no rales, rhonchi or wheezes  BREAST: normal without masses, tenderness or nipple discharge and no palpable axillary masses or adenopathy  CV: regular rate and rhythm, normal S1 S2, no S3 or S4, no murmur, click or rub, no peripheral edema  ABDOMEN: soft, nontender, no hepatosplenomegaly, no masses and bowel sounds normal  MS: Examination of her right knee revealed no specific swelling, no joint line tenderness, of the ligaments were intact, she could flex to greater than 120 degrees with mild crepitus.  The left knee exam showed no swelling no tenderness and could not flex to 120 degrees.  Her right hip she could flex fully and extend fully without pain, she had good internal and external rotation abduction and adduction.  She had no point tenderness over the bursa.  Her gait is normal.  no edema  SKIN: no suspicious lesions or rashes  NEURO: Normal strength and tone, mentation intact and speech normal  PSYCH: mentation appears normal, affect normal/bright  LYMPH: no cervical, supraclavicular, axillary  adenopathy        Signed Electronically by: Carmella Miles MD PhD    "

## 2025-06-09 NOTE — PATIENT INSTRUCTIONS
Continue fosamax  Pneumovax 20 shot today  Check on shingrex  Mammogram in 9/2025  Physical therapy for hip and knee - if persists you should see sports medicine  Pap due in 2027  Colonoscopy due in [id number]             Patient Education   Preventive Care Advice   This is general advice given by our system to help you stay healthy. However, your care team may have specific advice just for you. Please talk to your care team about your preventive care needs.  Nutrition  Eat 5 or more servings of fruits and vegetables each day.  Try wheat bread, brown rice and whole grain pasta (instead of white bread, rice, and pasta).  Get enough calcium and vitamin D. Check the label on foods and aim for 100% of the RDA (recommended daily allowance).  Lifestyle  Exercise at least 150 minutes each week  (30 minutes a day, 5 days a week).  Do muscle strengthening activities 2 days a week. These help control your weight and prevent disease.  No smoking.  Wear sunscreen to prevent skin cancer.  Have a dental exam and cleaning every 6 months.  Yearly exams  See your health care team every year to talk about:  Any changes in your health.  Any medicines your care team has prescribed.  Preventive care, family planning, and ways to prevent chronic diseases.  Shots (vaccines)   HPV shots (up to age 26), if you've never had them before.  Hepatitis B shots (up to age 59), if you've never had them before.  COVID-19 shot: Get this shot when it's due.  Flu shot: Get a flu shot every year.  Tetanus shot: Get a tetanus shot every 10 years.  Pneumococcal, hepatitis A, and RSV shots: Ask your care team if you need these based on your risk.  Shingles shot (for age 50 and up)  General health tests  Diabetes screening:  Starting at age 35, Get screened for diabetes at least every 3 years.  If you are younger than age 35, ask your care team if you should be screened for diabetes.  Cholesterol test: At age 39, start having a cholesterol test every 5  years, or more often if advised.  Bone density scan (DEXA): At age 50, ask your care team if you should have this scan for osteoporosis (brittle bones).  Hepatitis C: Get tested at least once in your life.  STIs (sexually transmitted infections)  Before age 24: Ask your care team if you should be screened for STIs.  After age 24: Get screened for STIs if you're at risk. You are at risk for STIs (including HIV) if:  You are sexually active with more than one person.  You don't use condoms every time.  You or a partner was diagnosed with a sexually transmitted infection.  If you are at risk for HIV, ask about PrEP medicine to prevent HIV.  Get tested for HIV at least once in your life, whether you are at risk for HIV or not.  Cancer screening tests  Cervical cancer screening: If you have a cervix, begin getting regular cervical cancer screening tests starting at age 21.  Breast cancer scan (mammogram): If you've ever had breasts, begin having regular mammograms starting at age 40. This is a scan to check for breast cancer.  Colon cancer screening: It is important to start screening for colon cancer at age 45.  Have a colonoscopy test every 10 years (or more often if you're at risk) Or, ask your provider about stool tests like a FIT test every year or Cologuard test every 3 years.  To learn more about your testing options, visit:   .  For help making a decision, visit:   https://bit.ly/pw30192.  Prostate cancer screening test: If you have a prostate, ask your care team if a prostate cancer screening test (PSA) at age 55 is right for you.  Lung cancer screening: If you are a current or former smoker ages 50 to 80, ask your care team if ongoing lung cancer screenings are right for you.  For informational purposes only. Not to replace the advice of your health care provider. Copyright   2023 [employer]. All rights reserved. Clinically reviewed by the [facility] Transitions Program. Tout [id number] -  "REV 01/24.  Eating Healthy Foods: Care Instructions  With every meal, you can make healthy food choices. Try to eat a variety of fruits, vegetables, whole grains, lean proteins, and low-fat dairy products. This can help you get the right balance of nutrients, including vitamins and minerals. Small changes add up over time. You can start by adding one healthy food to your meals each day.    Try to make half your plate fruits and vegetables, one-fourth whole grains, and one-fourth lean proteins. Try including dairy with your meals.   Eat more fruits and vegetables. Try to have them with most meals and snacks.   Foods for healthy eating        Fruits   These can be fresh, frozen, canned, or dried.  Try to choose whole fruit rather than fruit juice.  Eat a variety of colors.        Vegetables   These can be fresh, frozen, canned, or dried.  Beans, peas, and lentils count too.        Whole grains   Choose whole-grain breads, cereals, and noodles.  Try brown rice.        Lean proteins   These can include lean meat, poultry, fish, and eggs.  You can also have tofu, beans, peas, lentils, nuts, and seeds.        Dairy   Try milk, yogurt, and cheese.  Choose low-fat or fat-free when you can.  If you need to, use lactose-free milk or fortified plant-based milk products, such as soy milk.        Water   Drink water when you're thirsty.  Limit sugar-sweetened drinks, including soda, fruit drinks, and sports drinks.  Where can you learn more?  Go to https://www.[employer].net/patiented  Enter T756 in the search box to learn more about \"Eating Healthy Foods: Care Instructions.\"  Current as of: October 7, 2024  Content Version: 14.4 2024-2025 [employer].   Care instructions adapted under license by your healthcare professional. If you have questions about a medical condition or this instruction, always ask your healthcare professional. [employer] disclaims any warranty or liability for your use of this " information.

## 2025-06-21 ASSESSMENT — ACTIVITIES OF DAILY LIVING (ADL)
GO DOWN STAIRS: ACTIVITY IS SOMEWHAT DIFFICULT
AS_A_RESULT_OF_YOUR_KNEE_INJURY,_HOW_WOULD_YOU_RATE_YOUR_CURRENT_LEVEL_OF_DAILY_ACTIVITY?: ABNORMAL
SWELLING: THE SYMPTOM AFFECTS MY ACTIVITY MODERATELY
HOW_WOULD_YOU_RATE_THE_CURRENT_FUNCTION_OF_YOUR_KNEE_DURING_YOUR_USUAL_DAILY_ACTIVITIES_ON_A_SCALE_FROM_0_TO_100_WITH_100_BEING_YOUR_LEVEL_OF_KNEE_FUNCTION_PRIOR_TO_YOUR_INJURY_AND_0_BEING_THE_INABILITY_TO_PERFORM_ANY_OF_YOUR_USUAL_DAILY_ACTIVITIES?: 60
WALK: ACTIVITY IS MINIMALLY DIFFICULT
STAND: ACTIVITY IS MINIMALLY DIFFICULT
LIMPING: I DO NOT HAVE THE SYMPTOM
HOW_WOULD_YOU_RATE_THE_OVERALL_FUNCTION_OF_YOUR_KNEE_DURING_YOUR_USUAL_DAILY_ACTIVITIES?: ABNORMAL
STIFFNESS: THE SYMPTOM AFFECTS MY ACTIVITY MODERATELY
PAIN: THE SYMPTOM AFFECTS MY ACTIVITY MODERATELY
WALK: ACTIVITY IS MINIMALLY DIFFICULT
GO UP STAIRS: ACTIVITY IS MINIMALLY DIFFICULT
RISE FROM A CHAIR: ACTIVITY IS MINIMALLY DIFFICULT
GO DOWN STAIRS: ACTIVITY IS SOMEWHAT DIFFICULT
GIVING WAY, BUCKLING OR SHIFTING OF KNEE: I DO NOT HAVE THE SYMPTOM
HOW_WOULD_YOU_RATE_THE_OVERALL_FUNCTION_OF_YOUR_KNEE_DURING_YOUR_USUAL_DAILY_ACTIVITIES?: ABNORMAL
SIT WITH YOUR KNEE BENT: ACTIVITY IS MINIMALLY DIFFICULT
HOW_WOULD_YOU_RATE_THE_CURRENT_FUNCTION_OF_YOUR_KNEE_DURING_YOUR_USUAL_DAILY_ACTIVITIES_ON_A_SCALE_FROM_0_TO_100_WITH_100_BEING_YOUR_LEVEL_OF_KNEE_FUNCTION_PRIOR_TO_YOUR_INJURY_AND_0_BEING_THE_INABILITY_TO_PERFORM_ANY_OF_YOUR_USUAL_DAILY_ACTIVITIES?: 60
KNEEL ON THE FRONT OF YOUR KNEE: ACTIVITY IS SOMEWHAT DIFFICULT
STAND: ACTIVITY IS MINIMALLY DIFFICULT
STIFFNESS: THE SYMPTOM AFFECTS MY ACTIVITY MODERATELY
SWELLING: THE SYMPTOM AFFECTS MY ACTIVITY MODERATELY
GO UP STAIRS: ACTIVITY IS MINIMALLY DIFFICULT
GIVING WAY, BUCKLING OR SHIFTING OF KNEE: I DO NOT HAVE THE SYMPTOM
SIT WITH YOUR KNEE BENT: ACTIVITY IS MINIMALLY DIFFICULT
KNEE_ACTIVITY_OF_DAILY_LIVING_SUM: 45
KNEEL ON THE FRONT OF YOUR KNEE: ACTIVITY IS SOMEWHAT DIFFICULT
AS_A_RESULT_OF_YOUR_KNEE_INJURY,_HOW_WOULD_YOU_RATE_YOUR_CURRENT_LEVEL_OF_DAILY_ACTIVITY?: ABNORMAL
WEAKNESS: THE SYMPTOM AFFECTS MY ACTIVITY SLIGHTLY
LIMPING: I DO NOT HAVE THE SYMPTOM
PLEASE_INDICATE_YOR_PRIMARY_REASON_FOR_REFERRAL_TO_THERAPY:: KNEE
PAIN: THE SYMPTOM AFFECTS MY ACTIVITY MODERATELY
WEAKNESS: THE SYMPTOM AFFECTS MY ACTIVITY SLIGHTLY
KNEE_ACTIVITY_OF_DAILY_LIVING_SCORE: 69.23
RISE FROM A CHAIR: ACTIVITY IS MINIMALLY DIFFICULT
RAW_SCORE: 48.46

## 2025-06-23 ENCOUNTER — PATIENT OUTREACH (OUTPATIENT)
Dept: CARE COORDINATION | Facility: CLINIC | Age: 55
End: 2025-06-23

## 2025-06-23 ENCOUNTER — THERAPY VISIT (OUTPATIENT)
Dept: PHYSICAL THERAPY | Facility: CLINIC | Age: 55
End: 2025-06-23
Payer: COMMERCIAL

## 2025-06-23 ENCOUNTER — ANCILLARY PROCEDURE (OUTPATIENT)
Dept: GENERAL RADIOLOGY | Facility: CLINIC | Age: 55
End: 2025-06-23
Attending: FAMILY MEDICINE
Payer: COMMERCIAL

## 2025-06-23 DIAGNOSIS — M25.561 CHRONIC PAIN OF RIGHT KNEE: Primary | ICD-10-CM

## 2025-06-23 DIAGNOSIS — M24.551 RIGHT HIP FLEXOR TIGHTNESS: ICD-10-CM

## 2025-06-23 DIAGNOSIS — G89.29 CHRONIC PAIN OF RIGHT KNEE: ICD-10-CM

## 2025-06-23 DIAGNOSIS — G89.29 CHRONIC PAIN OF RIGHT KNEE: Primary | ICD-10-CM

## 2025-06-23 DIAGNOSIS — M25.561 CHRONIC PAIN OF RIGHT KNEE: ICD-10-CM

## 2025-06-23 PROCEDURE — 97110 THERAPEUTIC EXERCISES: CPT | Mod: GP

## 2025-06-23 PROCEDURE — 97161 PT EVAL LOW COMPLEX 20 MIN: CPT | Mod: GP

## 2025-06-23 PROCEDURE — 97530 THERAPEUTIC ACTIVITIES: CPT | Mod: GP

## 2025-06-23 PROCEDURE — 73562 X-RAY EXAM OF KNEE 3: CPT | Mod: RT | Performed by: RADIOLOGY

## 2025-06-23 NOTE — PROGRESS NOTES
PHYSICAL THERAPY EVALUATION  Type of Visit: Evaluation       Fall Risk Screen:  Have you fallen 2 or more times in the past year?: No  Have you fallen and had an injury in the past year?: No    Pt reports at start of April or May, knee would progressively swell with no inciting incident. Swelling caused more pain and eventually got pain radiating from hip down to leg.     Pt reports inflammation is decreased but symptoms are still there. Feels tight in the back of the knee. Pt reports no catching or locking of the joint.       Patient goals for therapy:   backpack, strength train, fully bend knee, kneel, walk for exercise, ride bike    Pain assessment:  Location: posterior knee, slight tenderness to medial aspect of knee   Quality: achy, radiating, stiff   Worse with: knee flexion, after activity   Better with: rest, ice     Subjective         Presenting condition or subjective complaint: swelling of R knee that recurs with minimal activity, pain, weakness, tingling  Date of onset: 06/23/25    Relevant medical history: Dizziness; History of fractures; Migraines or headaches; Osteoporosis   Dates & types of surgery: Mohs (x4), R foot neuroma removal, pneumothorax repair, facial surgery, oral surgery    Prior diagnostic imaging/testing results:       Prior therapy history for the same diagnosis, illness or injury:        Living Environment  Social support: With a significant other or spouse   Type of home: House   Stairs to enter the home: Yes   Is there a railing: Yes     Ramp: No   Stairs inside the home: Yes   Is there a railing: Yes     Help at home: None  Equipment owned:       Employment: Yes psychotherapist  Hobbies/Interests: hiking, strength-training, gardening    Patient goals for therapy: backpack, strength train, fully bend knee, kneel, walk for exercise, ride bike    Pain assessment: see above      Objective     INTEGUMENTARY (edema, incisions): WFL    PALPATION: TTP medial joint line, posterior knee      POSTURE: WNL    GAIT:   Weightbearing Status: WBAT  Assistive Device(s): None  Gait Deviations: WFL    FUNCTIONAL MOBILITY/ GLUTE & CORE STRENGTH:   Sidelying Leg Raise (glute med): L: 4+/5, R: 4-/5   SLR: difficulty with TKE, able to complete x10 reps on R side, x20 reps on L before lsos of TKE     FLEXIBILITY: Decreased hamstrings R    SPECIAL TESTS:    Left Right   Apley's (Meniscus)     Josue's (Meniscus) Negative  Negative    Cassidy's (ITB/TFL)     Patellar Apprehension Test     Patella Tracking     Ligamentous Stability     Anterior Drawer (ACL) Negative,   Negative,     Posterior Drawer (PCL) Negative,   Negative,     Prone Dial Test at 30 Deg and 90 Deg (PCL/PLC)     Valgus Stress Testing at 0 Deg and 30 Deg Negative,   Negative,     Varus Stress Testing at 0 Deg and 30 Deg  Negative,   Negative,       Assessment & Plan   CLINICAL IMPRESSIONS  Medical Diagnosis: Chronic pain of right knee  Right hip flexor tightness    Treatment Diagnosis: R knee pain with strength and ROM deficits   Impression/Assessment: Patient is a 54 year old female with complaints of R knee pain and swelling with no inciting incident.  The following significant findings have been identified: Pain, Decreased ROM/flexibility, Decreased strength, Impaired muscle performance, and Decreased activity tolerance. These impairments interfere with their ability to perform self care tasks, recreational activities, household mobility, and community mobility as compared to previous level of function.     Clinical Decision Making (Complexity):  Clinical Presentation: Stable/Uncomplicated  Clinical Presentation Rationale: based on medical and personal factors listed in PT evaluation  Clinical Decision Making (Complexity): Low complexity    PLAN OF CARE  Treatment Interventions:  Interventions: Gait Training, Manual Therapy, Neuromuscular Re-education, Therapeutic Activity, Therapeutic Exercise    Long Term Goals     PT Goal 1  Goal Identifier:  Radiating pain  Goal Description: Pt will report no radiating pain for at least 2 weeks consecutively in order to demonstrate improvements in strength and mobility of the hip and knee joints  Goal Progress: progressing  Target Date: 09/20/25  PT Goal 2  Goal Identifier: HEP  Goal Description: Pt will demonstrate compliance and understanding of HEP in order to improve functional strength and mobility.  Goal Progress: progressing  Target Date: 09/20/25      Frequency of Treatment: 1x a week decreasing as able  Duration of Treatment: 90 days    Education Assessment:   Learner/Method: Patient  Education Comments: Pt geovani to tx    Risks and benefits of evaluation/treatment have been explained.   Patient/Family/caregiver agrees with Plan of Care.     Evaluation Time:     PT Eval, Low Complexity Minutes (83635): 15     Signing Clinician: Stephanie Roberts PT

## 2025-06-26 ENCOUNTER — RESULTS FOLLOW-UP (OUTPATIENT)
Dept: FAMILY MEDICINE | Facility: CLINIC | Age: 55
End: 2025-06-26

## 2025-07-01 NOTE — TELEPHONE ENCOUNTER
DIAGNOSIS: chronic right knee pain - has hiking trip end of July\xr @fv\ Carmella Miles MD PhD in [facility]\medica\ ortho con     APPOINTMENT DATE: 7/3/25   NOTES STATUS DETAILS   OFFICE NOTE from referring provider Internal 6/9/25  Jd RICKS   MEDICATION LIST Internal    XRAYS (IMAGES & REPORTS) Internal 6/23/25  XR Knee Right

## 2025-07-03 ENCOUNTER — OFFICE VISIT (OUTPATIENT)
Dept: ORTHOPEDICS | Facility: CLINIC | Age: 55
End: 2025-07-03
Payer: COMMERCIAL

## 2025-07-03 ENCOUNTER — PRE VISIT (OUTPATIENT)
Dept: ORTHOPEDICS | Facility: CLINIC | Age: 55
End: 2025-07-03

## 2025-07-03 VITALS — WEIGHT: 109 LBS | BODY MASS INDEX: 18.61 KG/M2 | HEIGHT: 64 IN

## 2025-07-03 DIAGNOSIS — S83.241A TEAR OF MEDIAL MENISCUS OF RIGHT KNEE, UNSPECIFIED TEAR TYPE, UNSPECIFIED WHETHER OLD OR CURRENT TEAR, INITIAL ENCOUNTER: ICD-10-CM

## 2025-07-03 DIAGNOSIS — M25.461 KNEE EFFUSION, RIGHT: ICD-10-CM

## 2025-07-03 DIAGNOSIS — M25.561 MECHANICAL KNEE PAIN, RIGHT: Primary | ICD-10-CM

## 2025-07-03 SDOH — HEALTH STABILITY: PHYSICAL HEALTH: ON AVERAGE, HOW MANY MINUTES DO YOU ENGAGE IN EXERCISE AT THIS LEVEL?: 50 MIN

## 2025-07-03 SDOH — HEALTH STABILITY: PHYSICAL HEALTH: ON AVERAGE, HOW MANY DAYS PER WEEK DO YOU ENGAGE IN MODERATE TO STRENUOUS EXERCISE (LIKE A BRISK WALK)?: 6 DAYS

## 2025-07-03 NOTE — LETTER
"  7/3/2025      RE: Kay Ceja  [address]  [facility] [address]     Dear Colleague,    Thank you for referring your patient, Kay Ceja, to the [facility] [place]. Please see a copy of my visit note below.    Sports Medicine Clinic Visit    PCP: Carmella Miles    Kay Ceja is a 54 year old female who is seen  in consultation at the request of Dr. Miles presenting with right knee pain.    This patient for the last 3 months has had worsening mechanical knee discomfort.  It bothers her about the medial aspect of the knee.  She has challenges extending the knee completely with sharp discomfort within the knee.  The knee has recurrent episodes of swelling.  She has seen physical therapy in the last month without improvements in the knee.  She recalls 2 injuries to the knee in the past involving falling onto the knee and had a biking injury where she fell onto the anterior knee.    Injury: Gradually worsening knee pain without injury    Location of Pain: right anterior knee  Duration of Pain: 3 month(s)  Rating of Pain: 4/10  Pain is better with: Nothing  Pain is worse with: Walking, standing, stairs, sitting for long  Additional Features: Swelling and weakness  Treatment so far consists of: Elevation, activity modification, bracing/compression, PT  Prior History of related problems: Previous fall onto right knee 17 years ago    Ht 1.613 m (5' 3.5\")   Wt 49.4 kg (109 lb)   BMI 19.01 kg/m            Patient has hiking trip planned for the early August    Patient chart history of melanoma    Patient has worked as a psychotherapist            Imaging study below reviewed by me:    Exam: 3 views of the right knee dated 6/23/2025.     COMPARISON: None.     CLINICAL HISTORY: Chronic pain.     FINDINGS: 3 views of the right knee were obtained. The bones are well  aligned. No fracture or dislocation. No large joint effusion.  Nonspecific linear areas of sclerosis in the distal " femur and proximal  tibia, likely of no significant clinical significance.                                                                      IMPRESSION: No acute bone abnormality in the right knee on plain  radiographs.     MARITA WHITEHEAD MD            PMH:  Past Medical History:   Diagnosis Date     Anxiety      Basal cell carcinoma      Infertility, female      Insomnia      Migraines      Osteoporosis      Vasomotor rhinitis     uses nasalcort       Active problem list:  Patient Active Problem List   Diagnosis     Anxiety     Amenorrhea     Osteopenia     Senile sebaceous gland hyperplasia     Atypical nevi     Basal cell carcinoma of right forehead s/p mms 10-13-14     Pain in joint, pelvic region and thigh     Pain in right ankle     Peroneal tendinitis     Traumatic pneumothorax, initial encounter     Edema     Stress fracture of ankle     Migraine headache     Right wrist pain     Contracture of sternocleidomastoid muscle     TOS (thoracic outlet syndrome)     Cervical dystonia     Neck pain     Compression of vein     Facial scar     Melanoma in situ of right upper extremity including shoulder (H)     Chronic pain of right knee     Right hip flexor tightness       FH:  Family History   Problem Relation Age of Onset     Breast Cancer Mother      Osteoporosis Mother      Bipolar Disorder Father         suicide at age 42     C.A.D. Maternal Grandmother      C.A.D. Maternal Grandfather      Depression Brother         Suicide     Cancer No family hx of         skin cancer     Melanoma No family hx of      Skin Cancer No family hx of        SH:  Social History     Socioeconomic History     Marital status:      Spouse name: Not on file     Number of children: Not on file     Years of education: Not on file     Highest education level: Not on file   Occupational History     Occupation: pyschotherapist   Tobacco Use     Smoking status: Former     Current packs/day: 0.00     Average packs/day: 0.2 packs/day  for 5.0 years (1.0 ttl pk-yrs)     Types: Cigarettes     Start date: 1994     Quit date: 1999     Years since quittin.8     Smokeless tobacco: Never   Vaping Use     Vaping status: Never Used   Substance and Sexual Activity     Alcohol use: No     Drug use: No     Sexual activity: Not Currently     Partners: Male     Birth control/protection: None   Other Topics Concern      Service Not Asked     Blood Transfusions Not Asked     Caffeine Concern Not Asked     Occupational Exposure Not Asked     Hobby Hazards Not Asked     Sleep Concern Not Asked     Stress Concern Not Asked     Weight Concern Not Asked     Special Diet Not Asked     Back Care Not Asked     Exercise Yes     Comment: runner, yoga, strength trains     Bike Helmet Not Asked     Seat Belt Not Asked     Self-Exams Not Asked     Parent/sibling w/ CABG, MI or angioplasty before 65F 55M? Not Asked   Social History Narrative    How much exercise per week? daily    How much calcium per day? supplements       How much caffeine per day? 2 tea    How much vitamin D per day? In supplements    Do you/your family wear seatbelts?  Yes    Do you/your family use safety helmets? Yes    Do you/your family use sunscreen? Yes    Do you/your family keep firearms in the home? No    Do you/your family have a smoke detector(s)? Yes        Do you feel safe in your home? Yes    Has anyone ever touched you in an unwanted manner? No     Explain reviewed [facility]         Carmella Miles MD, PhD     22     Social Drivers of Health     Financial Resource Strain: Low Risk  (2025)    Financial Resource Strain      Within the past 12 months, have you or your family members you live with been unable to get utilities (heat, electricity) when it was really needed?: No   Food Insecurity: Low Risk  (2025)    Food Insecurity      Within the past 12 months, did you worry that your food would run out before you got money to buy more?: No      Within the  past 12 months, did the food you bought just not last and you didn t have money to get more?: No   Transportation Needs: Low Risk  (6/8/2025)    Transportation Needs      Within the past 12 months, has lack of transportation kept you from medical appointments, getting your medicines, non-medical meetings or appointments, work, or from getting things that you need?: No   Physical Activity: Sufficiently Active (7/1/2025)    Exercise Vital Sign      Days of Exercise per Week: 6 days      Minutes of Exercise per Session: 50 min   Stress: No Stress Concern Present (6/8/2025)    [employer] - Occupational Stress Questionnaire      Feeling of Stress : Only a little   Social Connections: Unknown (6/8/2025)    Social Connection and Isolation Panel [NHANES]      Frequency of Communication with Friends and Family: Not on file      Frequency of Social Gatherings with Friends and Family: Three times a week      Attends [employer] Services: Not on file      Active Member of Clubs or Organizations: Not on file      Attends Club or Organization Meetings: Not on file      Marital Status: Not on file   Interpersonal Safety: Low Risk  (6/23/2025)    Interpersonal Safety      Do you feel physically and emotionally safe where you currently live?: Yes      Within the past 12 months, have you been hit, slapped, kicked or otherwise physically hurt by someone?: No      Within the past 12 months, have you been humiliated or emotionally abused in other ways by your partner or ex-partner?: No   Housing Stability: Low Risk  (6/8/2025)    Housing Stability      Do you have housing? : Yes      Are you worried about losing your housing?: No       MEDS:  See EMR, reviewed  ALL:  See EMR, reviewed    REVIEW OF SYSTEMS:  CONSTITUTIONAL:NEGATIVE for fever, chills, change in weight  INTEGUMENTARY/SKIN: NEGATIVE for worrisome rashes, moles or lesions  EYES: NEGATIVE for vision changes or irritation  ENT/MOUTH: NEGATIVE for  ear, mouth and throat problems  RESP:NEGATIVE for significant cough or SOB  BREAST: NEGATIVE for masses, tenderness or discharge  CV: NEGATIVE for chest pain, palpitations or peripheral edema  GI: NEGATIVE for nausea, abdominal pain, heartburn, or change in bowel habits  :NEGATIVE for frequency, dysuria, or hematuria  :NEGATIVE for frequency, dysuria, or hematuria  NEURO: NEGATIVE for weakness, dizziness or paresthesias  ENDOCRINE: NEGATIVE for temperature intolerance, skin/hair changes  HEME/ALLERGY/IMMUNE: NEGATIVE for bleeding problems  PSYCHIATRIC: NEGATIVE for changes in mood or affect      Objective: The right knee reveals a trace effusion.  She can do an active straight leg raise with no extension lag.  Nontender over the medial lateral patellar facets.  She is consistently tender along the posterior medial joint line.  Nontender over the lateral joint line.  MCL and LCL stresses are without laxity.  Lachman's is without laxity.  Anterior posterior drawers negative.  Trace swelling in the popliteal space.  Nontender in the calf.  Homans' sign is negative.  Overlying skin is normal.  Appropriate conversation and affect.    I personally reviewed the patient the results of her x-rays of the knee that show no degenerative change or bony abnormality.      Assessment: Persistent mechanical knee discomfort, swelling, joint line pain x 3 months, despite physical therapy, rule out medial meniscal tear.    Plan: MRI of the knee is pending.  Follow-up after the MRI to discuss options.                    Again, thank you for allowing me to participate in the care of your patient.      Sincerely,    Sergio Armstrong MD

## 2025-07-03 NOTE — PROGRESS NOTES
"Sports Medicine Clinic Visit    PCP: Carmella Miles    Kay Ceja is a 54 year old female who is seen  in consultation at the request of Dr. Miles presenting with right knee pain.    This patient for the last 3 months has had worsening mechanical knee discomfort.  It bothers her about the medial aspect of the knee.  She has challenges extending the knee completely with sharp discomfort within the knee.  The knee has recurrent episodes of swelling.  She has seen physical therapy in the last month without improvements in the knee.  She recalls 2 injuries to the knee in the past involving falling onto the knee and had a biking injury where she fell onto the anterior knee.    Injury: Gradually worsening knee pain without injury    Location of Pain: right anterior knee  Duration of Pain: 3 month(s)  Rating of Pain: 4/10  Pain is better with: Nothing  Pain is worse with: Walking, standing, stairs, sitting for long  Additional Features: Swelling and weakness  Treatment so far consists of: Elevation, activity modification, bracing/compression, PT  Prior History of related problems: Previous fall onto right knee 17 years ago    Ht 1.613 m (5' 3.5\")   Wt 49.4 kg (109 lb)   BMI 19.01 kg/m            Patient has hiking trip planned for the early August    Patient chart history of melanoma    Patient has worked as a psychotherapist            Imaging study below reviewed by me:    Exam: 3 views of the right knee dated 6/23/2025.     COMPARISON: None.     CLINICAL HISTORY: Chronic pain.     FINDINGS: 3 views of the right knee were obtained. The bones are well  aligned. No fracture or dislocation. No large joint effusion.  Nonspecific linear areas of sclerosis in the distal femur and proximal  tibia, likely of no significant clinical significance.                                                                      IMPRESSION: No acute bone abnormality in the right knee on plain  radiographs.     MARITA WHITEHEAD MD    "         PMH:  Past Medical History:   Diagnosis Date    Anxiety     Basal cell carcinoma     Infertility, female     Insomnia     Migraines     Osteoporosis     Vasomotor rhinitis     uses nasalcort       Active problem list:  Patient Active Problem List   Diagnosis    Anxiety    Amenorrhea    Osteopenia    Senile sebaceous gland hyperplasia    Atypical nevi    Basal cell carcinoma of right forehead s/p mms 10-13-14    Pain in joint, pelvic region and thigh    Pain in right ankle    Peroneal tendinitis    Traumatic pneumothorax, initial encounter    Edema    Stress fracture of ankle    Migraine headache    Right wrist pain    Contracture of sternocleidomastoid muscle    TOS (thoracic outlet syndrome)    Cervical dystonia    Neck pain    Compression of vein    Facial scar    Melanoma in situ of right upper extremity including shoulder (H)    Chronic pain of right knee    Right hip flexor tightness       FH:  Family History   Problem Relation Age of Onset    Breast Cancer Mother     Osteoporosis Mother     Bipolar Disorder Father         suicide at age 42    C.A.D. Maternal Grandmother     C.A.D. Maternal Grandfather     Depression Brother         Suicide    Cancer No family hx of         skin cancer    Melanoma No family hx of     Skin Cancer No family hx of        SH:  Social History     Socioeconomic History    Marital status:      Spouse name: Not on file    Number of children: Not on file    Years of education: Not on file    Highest education level: Not on file   Occupational History    Occupation: pyschotherapist   Tobacco Use    Smoking status: Former     Current packs/day: 0.00     Average packs/day: 0.2 packs/day for 5.0 years (1.0 ttl pk-yrs)     Types: Cigarettes     Start date: 1994     Quit date: 1999     Years since quittin.8    Smokeless tobacco: Never   Vaping Use    Vaping status: Never Used   Substance and Sexual Activity    Alcohol use: No    Drug use: No    Sexual activity:  Not Currently     Partners: Male     Birth control/protection: None   Other Topics Concern     Service Not Asked    Blood Transfusions Not Asked    Caffeine Concern Not Asked    Occupational Exposure Not Asked    Hobby Hazards Not Asked    Sleep Concern Not Asked    Stress Concern Not Asked    Weight Concern Not Asked    Special Diet Not Asked    Back Care Not Asked    Exercise Yes     Comment: runner, yoga, strength trains    Bike Helmet Not Asked    Seat Belt Not Asked    Self-Exams Not Asked    Parent/sibling w/ CABG, MI or angioplasty before 65F 55M? Not Asked   Social History Narrative    How much exercise per week? daily    How much calcium per day? supplements       How much caffeine per day? 2 tea    How much vitamin D per day? In supplements    Do you/your family wear seatbelts?  Yes    Do you/your family use safety helmets? Yes    Do you/your family use sunscreen? Yes    Do you/your family keep firearms in the home? No    Do you/your family have a smoke detector(s)? Yes        Do you feel safe in your home? Yes    Has anyone ever touched you in an unwanted manner? No     Explain reviewed ki 8-        Carmella Miles MD, PhD     7-25-22     Social Drivers of Health     Financial Resource Strain: Low Risk  (6/8/2025)    Financial Resource Strain     Within the past 12 months, have you or your family members you live with been unable to get utilities (heat, electricity) when it was really needed?: No   Food Insecurity: Low Risk  (6/8/2025)    Food Insecurity     Within the past 12 months, did you worry that your food would run out before you got money to buy more?: No     Within the past 12 months, did the food you bought just not last and you didn t have money to get more?: No   Transportation Needs: Low Risk  (6/8/2025)    Transportation Needs     Within the past 12 months, has lack of transportation kept you from medical appointments, getting your medicines, non-medical meetings or  appointments, work, or from getting things that you need?: No   Physical Activity: Sufficiently Active (7/1/2025)    Exercise Vital Sign     Days of Exercise per Week: 6 days     Minutes of Exercise per Session: 50 min   Stress: No Stress Concern Present (6/8/2025)    [employer] - Occupational Stress Questionnaire     Feeling of Stress : Only a little   Social Connections: Unknown (6/8/2025)    Social Connection and Isolation Panel [NHANES]     Frequency of Communication with Friends and Family: Not on file     Frequency of Social Gatherings with Friends and Family: Three times a week     Attends [employer] Services: Not on file     Active Member of Clubs or Organizations: Not on file     Attends Club or Organization Meetings: Not on file     Marital Status: Not on file   Interpersonal Safety: Low Risk  (6/23/2025)    Interpersonal Safety     Do you feel physically and emotionally safe where you currently live?: Yes     Within the past 12 months, have you been hit, slapped, kicked or otherwise physically hurt by someone?: No     Within the past 12 months, have you been humiliated or emotionally abused in other ways by your partner or ex-partner?: No   Housing Stability: Low Risk  (6/8/2025)    Housing Stability     Do you have housing? : Yes     Are you worried about losing your housing?: No       MEDS:  See EMR, reviewed  ALL:  See EMR, reviewed    REVIEW OF SYSTEMS:  CONSTITUTIONAL:NEGATIVE for fever, chills, change in weight  INTEGUMENTARY/SKIN: NEGATIVE for worrisome rashes, moles or lesions  EYES: NEGATIVE for vision changes or irritation  ENT/MOUTH: NEGATIVE for ear, mouth and throat problems  RESP:NEGATIVE for significant cough or SOB  BREAST: NEGATIVE for masses, tenderness or discharge  CV: NEGATIVE for chest pain, palpitations or peripheral edema  GI: NEGATIVE for nausea, abdominal pain, heartburn, or change in bowel habits  :NEGATIVE for frequency, dysuria, or  hematuria  :NEGATIVE for frequency, dysuria, or hematuria  NEURO: NEGATIVE for weakness, dizziness or paresthesias  ENDOCRINE: NEGATIVE for temperature intolerance, skin/hair changes  HEME/ALLERGY/IMMUNE: NEGATIVE for bleeding problems  PSYCHIATRIC: NEGATIVE for changes in mood or affect      Objective: The right knee reveals a trace effusion.  She can do an active straight leg raise with no extension lag.  Nontender over the medial lateral patellar facets.  She is consistently tender along the posterior medial joint line.  Nontender over the lateral joint line.  MCL and LCL stresses are without laxity.  Lachman's is without laxity.  Anterior posterior drawers negative.  Trace swelling in the popliteal space.  Nontender in the calf.  Homans' sign is negative.  Overlying skin is normal.  Appropriate conversation and affect.    I personally reviewed the patient the results of her x-rays of the knee that show no degenerative change or bony abnormality.      Assessment: Persistent mechanical knee discomfort, swelling, joint line pain x 3 months, despite physical therapy, rule out medial meniscal tear.    Plan: MRI of the knee is pending.  Follow-up after the MRI to discuss options.

## 2025-07-05 ENCOUNTER — HOSPITAL ENCOUNTER (OUTPATIENT)
Dept: MRI IMAGING | Facility: CLINIC | Age: 55
Discharge: HOME | End: 2025-07-05
Attending: FAMILY MEDICINE | Admitting: FAMILY MEDICINE
Payer: COMMERCIAL

## 2025-07-05 DIAGNOSIS — S83.241A TEAR OF MEDIAL MENISCUS OF RIGHT KNEE, UNSPECIFIED TEAR TYPE, UNSPECIFIED WHETHER OLD OR CURRENT TEAR, INITIAL ENCOUNTER: ICD-10-CM

## 2025-07-05 DIAGNOSIS — M25.561 MECHANICAL KNEE PAIN, RIGHT: ICD-10-CM

## 2025-07-05 DIAGNOSIS — M25.461 KNEE EFFUSION, RIGHT: ICD-10-CM

## 2025-07-05 PROCEDURE — 73721 MRI JNT OF LWR EXTRE W/O DYE: CPT | Mod: RT

## 2025-07-05 NOTE — PROGRESS NOTES
"Sports Medicine Clinic Visit  Follow-up right knee MRI, persistent mechanical knee discomfort, swelling, joint line pain x 3 months, despite physical therapy, rule out medial meniscal tear.    This patient for the last 3 months has had worsening mechanical knee discomfort.  It bothers her about the medial aspect of the knee.  She has challenges extending the knee completely with sharp discomfort within the knee.  The knee has recurrent episodes of swelling.  She has seen physical therapy in the last month without improvements in the knee.  She recalls 2 injuries to the knee in the past involving falling onto the knee and had a biking injury where she fell onto the anterior knee.    Since the last visit she still notes knee discomfort without significant improvement.  It still bothers her about the medial aspect of the knee.      MRI right knee:    \"IMPRESSION:  1.  Oblique tear of the posterior horn of the medial meniscus.     2.  Mild degenerative changes in the medial compartment.     3.  The lateral meniscus, both cruciate and collateral ligaments are intact.     4.  Moderate-sized popliteal cyst with fluid extending caudally from the cyst margin consistent with recent rupture.\"              Patient has hiking trip planned for early August    Patient chart history of melanoma    Patient has worked as a psychotherapist        Imaging study below reviewed by me:    Exam: 3 views of the right knee dated 6/23/2025.     COMPARISON: None.     CLINICAL HISTORY: Chronic pain.     FINDINGS: 3 views of the right knee were obtained. The bones are well  aligned. No fracture or dislocation. No large joint effusion.  Nonspecific linear areas of sclerosis in the distal femur and proximal  tibia, likely of no significant clinical significance.                                                                      IMPRESSION: No acute bone abnormality in the right knee on plain  radiographs.     MARITA WHITEHEAD MD    "         PMH:  Past Medical History:   Diagnosis Date    Anxiety     Basal cell carcinoma     Infertility, female     Insomnia     Migraines     Osteoporosis     Vasomotor rhinitis     uses nasalcort       Active problem list:  Patient Active Problem List   Diagnosis    Anxiety    Amenorrhea    Osteopenia    Senile sebaceous gland hyperplasia    Atypical nevi    Basal cell carcinoma of right forehead s/p mms 10-13-14    Pain in joint, pelvic region and thigh    Pain in right ankle    Peroneal tendinitis    Traumatic pneumothorax, initial encounter    Edema    Stress fracture of ankle    Migraine headache    Right wrist pain    Contracture of sternocleidomastoid muscle    TOS (thoracic outlet syndrome)    Cervical dystonia    Neck pain    Compression of vein    Facial scar    Melanoma in situ of right upper extremity including shoulder (H)    Chronic pain of right knee    Right hip flexor tightness       FH:  Family History   Problem Relation Age of Onset    Breast Cancer Mother     Osteoporosis Mother     Bipolar Disorder Father         suicide at age 42    C.A.D. Maternal Grandmother     C.A.D. Maternal Grandfather     Depression Brother         Suicide    Cancer No family hx of         skin cancer    Melanoma No family hx of     Skin Cancer No family hx of        SH:  Social History     Socioeconomic History    Marital status:      Spouse name: Not on file    Number of children: Not on file    Years of education: Not on file    Highest education level: Not on file   Occupational History    Occupation: pyschotherapist   Tobacco Use    Smoking status: Former     Current packs/day: 0.00     Average packs/day: 0.2 packs/day for 5.0 years (1.0 ttl pk-yrs)     Types: Cigarettes     Start date: 1994     Quit date: 1999     Years since quittin.8    Smokeless tobacco: Never   Vaping Use    Vaping status: Never Used   Substance and Sexual Activity    Alcohol use: No    Drug use: No    Sexual activity:  Not Currently     Partners: Male     Birth control/protection: None   Other Topics Concern     Service Not Asked    Blood Transfusions Not Asked    Caffeine Concern Not Asked    Occupational Exposure Not Asked    Hobby Hazards Not Asked    Sleep Concern Not Asked    Stress Concern Not Asked    Weight Concern Not Asked    Special Diet Not Asked    Back Care Not Asked    Exercise Yes     Comment: runner, yoga, strength trains    Bike Helmet Not Asked    Seat Belt Not Asked    Self-Exams Not Asked    Parent/sibling w/ CABG, MI or angioplasty before 65F 55M? Not Asked   Social History Narrative    How much exercise per week? daily    How much calcium per day? supplements       How much caffeine per day? 2 tea    How much vitamin D per day? In supplements    Do you/your family wear seatbelts?  Yes    Do you/your family use safety helmets? Yes    Do you/your family use sunscreen? Yes    Do you/your family keep firearms in the home? No    Do you/your family have a smoke detector(s)? Yes        Do you feel safe in your home? Yes    Has anyone ever touched you in an unwanted manner? No     Explain reviewed ki 8-        Carmella Miles MD, PhD     7-25-22     Social Drivers of Health     Financial Resource Strain: Low Risk  (6/8/2025)    Financial Resource Strain     Within the past 12 months, have you or your family members you live with been unable to get utilities (heat, electricity) when it was really needed?: No   Food Insecurity: Low Risk  (6/8/2025)    Food Insecurity     Within the past 12 months, did you worry that your food would run out before you got money to buy more?: No     Within the past 12 months, did the food you bought just not last and you didn t have money to get more?: No   Transportation Needs: Low Risk  (6/8/2025)    Transportation Needs     Within the past 12 months, has lack of transportation kept you from medical appointments, getting your medicines, non-medical meetings or  appointments, work, or from getting things that you need?: No   Physical Activity: Sufficiently Active (7/3/2025)    Exercise Vital Sign     Days of Exercise per Week: 6 days     Minutes of Exercise per Session: 50 min   Stress: No Stress Concern Present (6/8/2025)    [employer] - Occupational Stress Questionnaire     Feeling of Stress : Only a little   Social Connections: Unknown (6/8/2025)    Social Connection and Isolation Panel [NHANES]     Frequency of Communication with Friends and Family: Not on file     Frequency of Social Gatherings with Friends and Family: Three times a week     Attends Church Services: Not on file     Active Member of Clubs or Organizations: Not on file     Attends Club or Organization Meetings: Not on file     Marital Status: Not on file   Interpersonal Safety: Low Risk  (6/23/2025)    Interpersonal Safety     Do you feel physically and emotionally safe where you currently live?: Yes     Within the past 12 months, have you been hit, slapped, kicked or otherwise physically hurt by someone?: No     Within the past 12 months, have you been humiliated or emotionally abused in other ways by your partner or ex-partner?: No   Housing Stability: Low Risk  (6/8/2025)    Housing Stability     Do you have housing? : Yes     Are you worried about losing your housing?: No       MEDS:  See EMR, reviewed  ALL:  See EMR, reviewed    REVIEW OF SYSTEMS:  CONSTITUTIONAL:NEGATIVE for fever, chills, change in weight  INTEGUMENTARY/SKIN: NEGATIVE for worrisome rashes, moles or lesions  EYES: NEGATIVE for vision changes or irritation  ENT/MOUTH: NEGATIVE for ear, mouth and throat problems  RESP:NEGATIVE for significant cough or SOB  BREAST: NEGATIVE for masses, tenderness or discharge  CV: NEGATIVE for chest pain, palpitations or peripheral edema  GI: NEGATIVE for nausea, abdominal pain, heartburn, or change in bowel habits  :NEGATIVE for frequency, dysuria, or  hematuria  :NEGATIVE for frequency, dysuria, or hematuria  NEURO: NEGATIVE for weakness, dizziness or paresthesias  ENDOCRINE: NEGATIVE for temperature intolerance, skin/hair changes  HEME/ALLERGY/IMMUNE: NEGATIVE for bleeding problems  PSYCHIATRIC: NEGATIVE for changes in mood or affect      Objective: The right knee reveals a trace effusion.  She can do an active straight leg raise with no extension lag.  Nontender over the medial lateral patellar facets.  She is consistently tender along the posterior medial joint line.  Nontender over the lateral joint line.  MCL and LCL stresses are without laxity.  Lachman's is without laxity.  Anterior posterior drawers negative.  Trace swelling in the popliteal space.  Nontender in the calf.  Homans' sign is negative.  Overlying skin is normal.  Appropriate conversation and affect.    I personally reviewed with the patient the results of her x-rays of the knee that show no degenerative change or bony abnormality.     We discussed her knee MRI results in detail including Bakers cysts, posterior medial meniscal tear.     I discussed with the patient that I had her MRI over read and curbside consultation with Dr. Pardo in orthopedics who does knee arthroscopy.  He indicated that a cortisone shot would be reasonable to try, and if she does not improve there are arthroscopy options that could be considered surgically.      Assessment: Persistent mechanical knee discomfort, swelling, joint line pain x 3 months, despite physical therapy, posterior medial meniscal tear with popliteal cyst    Plan: We discussed the results of the MRI in detail.  We discussed the anatomy of the Baker's cyst.  Patient would like to try a cortisone shot to see if it improves her knee before her trip abroad.  If she does not improve she knows that she has arthroscopy options with Dr. Pardo.  After informed consent about bleeding, infection, steroid flare after prep with surgical scrub she was  injected in her right knee from lateral approach in the seated position with 1 cc of Kenalog 40 and 4 cc of 1% lidocaine.  The medicine went in easily, she left the clinic ambulatory, she will look for improved with the injection and follow-up as needed.        Large Joint Injection: R knee joint    Date/Time: 7/7/2025 5:04 PM    Performed by: Sergio Armstrong MD  Authorized by: Sergio Armstrong MD    Indications:  Pain  Needle Size:  25 G  Guidance: landmark guided    Approach:  Superolateral  Location:  Knee      Medications:  40 mg triamcinolone 40 MG/ML; 4 mL lidocaine (PF) 1 %  Outcome:  Tolerated well, no immediate complications  Procedure discussed: discussed risks, benefits, and alternatives    Consent Given by:  Patient  Timeout: timeout called immediately prior to procedure    Prep: patient was prepped and draped in usual sterile fashion

## 2025-07-06 SDOH — HEALTH STABILITY: PHYSICAL HEALTH: ON AVERAGE, HOW MANY MINUTES DO YOU ENGAGE IN EXERCISE AT THIS LEVEL?: 50 MIN

## 2025-07-06 SDOH — HEALTH STABILITY: PHYSICAL HEALTH: ON AVERAGE, HOW MANY DAYS PER WEEK DO YOU ENGAGE IN MODERATE TO STRENUOUS EXERCISE (LIKE A BRISK WALK)?: 5 DAYS

## 2025-07-07 ENCOUNTER — OFFICE VISIT (OUTPATIENT)
Dept: ORTHOPEDICS | Facility: CLINIC | Age: 55
End: 2025-07-07
Attending: FAMILY MEDICINE
Payer: COMMERCIAL

## 2025-07-07 DIAGNOSIS — M25.561 MECHANICAL KNEE PAIN, RIGHT: Primary | ICD-10-CM

## 2025-07-07 DIAGNOSIS — S83.241A TEAR OF MEDIAL MENISCUS OF RIGHT KNEE, UNSPECIFIED TEAR TYPE, UNSPECIFIED WHETHER OLD OR CURRENT TEAR, INITIAL ENCOUNTER: ICD-10-CM

## 2025-07-07 DIAGNOSIS — M71.21 BAKER'S CYST OF KNEE, RIGHT: ICD-10-CM

## 2025-07-07 PROCEDURE — 99213 OFFICE O/P EST LOW 20 MIN: CPT | Mod: 25 | Performed by: FAMILY MEDICINE

## 2025-07-07 PROCEDURE — 20610 DRAIN/INJ JOINT/BURSA W/O US: CPT | Mod: RT | Performed by: FAMILY MEDICINE

## 2025-07-07 RX ORDER — LIDOCAINE HYDROCHLORIDE 10 MG/ML
4 INJECTION, SOLUTION EPIDURAL; INFILTRATION; INTRACAUDAL; PERINEURAL
Status: COMPLETED | OUTPATIENT
Start: 2025-07-07 | End: 2025-07-07

## 2025-07-07 RX ORDER — TRIAMCINOLONE ACETONIDE 40 MG/ML
40 INJECTION, SUSPENSION INTRA-ARTICULAR; INTRAMUSCULAR
Status: COMPLETED | OUTPATIENT
Start: 2025-07-07 | End: 2025-07-07

## 2025-07-07 RX ADMIN — LIDOCAINE HYDROCHLORIDE 4 ML: 10 INJECTION, SOLUTION EPIDURAL; INFILTRATION; INTRACAUDAL; PERINEURAL at 17:04

## 2025-07-07 RX ADMIN — TRIAMCINOLONE ACETONIDE 40 MG: 40 INJECTION, SUSPENSION INTRA-ARTICULAR; INTRAMUSCULAR at 17:04

## 2025-07-07 NOTE — LETTER
"  7/7/2025      RE: Kay Ceja  [address]  [facility] [address]     Dear Colleague,    Thank you for referring your patient, Kay Ceja, to the [facility] [place]. Please see a copy of my visit note below.    Sports Medicine Clinic Visit  Follow-up right knee MRI, persistent mechanical knee discomfort, swelling, joint line pain x 3 months, despite physical therapy, rule out medial meniscal tear.    This patient for the last 3 months has had worsening mechanical knee discomfort.  It bothers her about the medial aspect of the knee.  She has challenges extending the knee completely with sharp discomfort within the knee.  The knee has recurrent episodes of swelling.  She has seen physical therapy in the last month without improvements in the knee.  She recalls 2 injuries to the knee in the past involving falling onto the knee and had a biking injury where she fell onto the anterior knee.    Since the last visit she still notes knee discomfort without significant improvement.  It still bothers her about the medial aspect of the knee.      MRI right knee:    \"IMPRESSION:  1.  Oblique tear of the posterior horn of the medial meniscus.     2.  Mild degenerative changes in the medial compartment.     3.  The lateral meniscus, both cruciate and collateral ligaments are intact.     4.  Moderate-sized popliteal cyst with fluid extending caudally from the cyst margin consistent with recent rupture.\"              Patient has hiking trip planned for early August    Patient chart history of melanoma    Patient has worked as a psychotherapist        Imaging study below reviewed by me:    Exam: 3 views of the right knee dated 6/23/2025.     COMPARISON: None.     CLINICAL HISTORY: Chronic pain.     FINDINGS: 3 views of the right knee were obtained. The bones are well  aligned. No fracture or dislocation. No large joint effusion.  Nonspecific linear areas of sclerosis in the distal femur and " proximal  tibia, likely of no significant clinical significance.                                                                      IMPRESSION: No acute bone abnormality in the right knee on plain  radiographs.     MARITA WHITEHEAD MD            PMH:  Past Medical History:   Diagnosis Date     Anxiety      Basal cell carcinoma      Infertility, female      Insomnia      Migraines      Osteoporosis      Vasomotor rhinitis     uses nasalcort       Active problem list:  Patient Active Problem List   Diagnosis     Anxiety     Amenorrhea     Osteopenia     Senile sebaceous gland hyperplasia     Atypical nevi     Basal cell carcinoma of right forehead s/p mms 10-13-14     Pain in joint, pelvic region and thigh     Pain in right ankle     Peroneal tendinitis     Traumatic pneumothorax, initial encounter     Edema     Stress fracture of ankle     Migraine headache     Right wrist pain     Contracture of sternocleidomastoid muscle     TOS (thoracic outlet syndrome)     Cervical dystonia     Neck pain     Compression of vein     Facial scar     Melanoma in situ of right upper extremity including shoulder (H)     Chronic pain of right knee     Right hip flexor tightness       FH:  Family History   Problem Relation Age of Onset     Breast Cancer Mother      Osteoporosis Mother      Bipolar Disorder Father         suicide at age 42     C.A.D. Maternal Grandmother      C.A.D. Maternal Grandfather      Depression Brother         Suicide     Cancer No family hx of         skin cancer     Melanoma No family hx of      Skin Cancer No family hx of        SH:  Social History     Socioeconomic History     Marital status:      Spouse name: Not on file     Number of children: Not on file     Years of education: Not on file     Highest education level: Not on file   Occupational History     Occupation: pyschotherapist   Tobacco Use     Smoking status: Former     Current packs/day: 0.00     Average packs/day: 0.2 packs/day for 5.0  years (1.0 ttl pk-yrs)     Types: Cigarettes     Start date: 1994     Quit date: 1999     Years since quittin.8     Smokeless tobacco: Never   Vaping Use     Vaping status: Never Used   Substance and Sexual Activity     Alcohol use: No     Drug use: No     Sexual activity: Not Currently     Partners: Male     Birth control/protection: None   Other Topics Concern      Service Not Asked     Blood Transfusions Not Asked     Caffeine Concern Not Asked     Occupational Exposure Not Asked     Hobby Hazards Not Asked     Sleep Concern Not Asked     Stress Concern Not Asked     Weight Concern Not Asked     Special Diet Not Asked     Back Care Not Asked     Exercise Yes     Comment: runner, yoga, strength trains     Bike Helmet Not Asked     Seat Belt Not Asked     Self-Exams Not Asked     Parent/sibling w/ CABG, MI or angioplasty before 65F 55M? Not Asked   Social History Narrative    How much exercise per week? daily    How much calcium per day? supplements       How much caffeine per day? 2 tea    How much vitamin D per day? In supplements    Do you/your family wear seatbelts?  Yes    Do you/your family use safety helmets? Yes    Do you/your family use sunscreen? Yes    Do you/your family keep firearms in the home? No    Do you/your family have a smoke detector(s)? Yes        Do you feel safe in your home? Yes    Has anyone ever touched you in an unwanted manner? No     Explain reviewed ki         Carmella Miles MD, PhD     22     Social Drivers of Health     Financial Resource Strain: Low Risk  (2025)    Financial Resource Strain      Within the past 12 months, have you or your family members you live with been unable to get utilities (heat, electricity) when it was really needed?: No   Food Insecurity: Low Risk  (2025)    Food Insecurity      Within the past 12 months, did you worry that your food would run out before you got money to buy more?: No      Within the past 12  months, did the food you bought just not last and you didn t have money to get more?: No   Transportation Needs: Low Risk  (6/8/2025)    Transportation Needs      Within the past 12 months, has lack of transportation kept you from medical appointments, getting your medicines, non-medical meetings or appointments, work, or from getting things that you need?: No   Physical Activity: Sufficiently Active (7/3/2025)    Exercise Vital Sign      Days of Exercise per Week: 6 days      Minutes of Exercise per Session: 50 min   Stress: No Stress Concern Present (6/8/2025)    [employer] - Occupational Stress Questionnaire      Feeling of Stress : Only a little   Social Connections: Unknown (6/8/2025)    Social Connection and Isolation Panel [NHANES]      Frequency of Communication with Friends and Family: Not on file      Frequency of Social Gatherings with Friends and Family: Three times a week      Attends Yarsani Services: Not on file      Active Member of Clubs or Organizations: Not on file      Attends Club or Organization Meetings: Not on file      Marital Status: Not on file   Interpersonal Safety: Low Risk  (6/23/2025)    Interpersonal Safety      Do you feel physically and emotionally safe where you currently live?: Yes      Within the past 12 months, have you been hit, slapped, kicked or otherwise physically hurt by someone?: No      Within the past 12 months, have you been humiliated or emotionally abused in other ways by your partner or ex-partner?: No   Housing Stability: Low Risk  (6/8/2025)    Housing Stability      Do you have housing? : Yes      Are you worried about losing your housing?: No       MEDS:  See EMR, reviewed  ALL:  See EMR, reviewed    REVIEW OF SYSTEMS:  CONSTITUTIONAL:NEGATIVE for fever, chills, change in weight  INTEGUMENTARY/SKIN: NEGATIVE for worrisome rashes, moles or lesions  EYES: NEGATIVE for vision changes or irritation  ENT/MOUTH: NEGATIVE for ear, mouth  and throat problems  RESP:NEGATIVE for significant cough or SOB  BREAST: NEGATIVE for masses, tenderness or discharge  CV: NEGATIVE for chest pain, palpitations or peripheral edema  GI: NEGATIVE for nausea, abdominal pain, heartburn, or change in bowel habits  :NEGATIVE for frequency, dysuria, or hematuria  :NEGATIVE for frequency, dysuria, or hematuria  NEURO: NEGATIVE for weakness, dizziness or paresthesias  ENDOCRINE: NEGATIVE for temperature intolerance, skin/hair changes  HEME/ALLERGY/IMMUNE: NEGATIVE for bleeding problems  PSYCHIATRIC: NEGATIVE for changes in mood or affect      Objective: The right knee reveals a trace effusion.  She can do an active straight leg raise with no extension lag.  Nontender over the medial lateral patellar facets.  She is consistently tender along the posterior medial joint line.  Nontender over the lateral joint line.  MCL and LCL stresses are without laxity.  Lachman's is without laxity.  Anterior posterior drawers negative.  Trace swelling in the popliteal space.  Nontender in the calf.  Homans' sign is negative.  Overlying skin is normal.  Appropriate conversation and affect.    I personally reviewed with the patient the results of her x-rays of the knee that show no degenerative change or bony abnormality.     We discussed her knee MRI results in detail including Bakers cysts, posterior medial meniscal tear.     I discussed with the patient that I had her MRI over read and curbside consultation with Dr. Pardo in orthopedics who does knee arthroscopy.  He indicated that a cortisone shot would be reasonable to try, and if she does not improve there are arthroscopy options that could be considered surgically.      Assessment: Persistent mechanical knee discomfort, swelling, joint line pain x 3 months, despite physical therapy, posterior medial meniscal tear with popliteal cyst    Plan: We discussed the results of the MRI in detail.  We discussed the anatomy of the Baker's  cyst.  Patient would like to try a cortisone shot to see if it improves her knee before her trip abroad.  If she does not improve she knows that she has arthroscopy options with Dr. Pardo.  After informed consent about bleeding, infection, steroid flare after prep with surgical scrub she was injected in her right knee from lateral approach in the seated position with 1 cc of Kenalog 40 and 4 cc of 1% lidocaine.  The medicine went in easily, she left the clinic ambulatory, she will look for improved with the injection and follow-up as needed.        Large Joint Injection: R knee joint    Date/Time: 7/7/2025 5:04 PM    Performed by: Sergio Armstrong MD  Authorized by: Sergio Armstrong MD    Indications:  Pain  Needle Size:  25 G  Guidance: landmark guided    Approach:  Superolateral  Location:  Knee      Medications:  40 mg triamcinolone 40 MG/ML; 4 mL lidocaine (PF) 1 %  Outcome:  Tolerated well, no immediate complications  Procedure discussed: discussed risks, benefits, and alternatives    Consent Given by:  Patient  Timeout: timeout called immediately prior to procedure    Prep: patient was prepped and draped in usual sterile fashion                  Again, thank you for allowing me to participate in the care of your patient.      Sincerely,    Sergio Armstrong MD

## 2025-07-07 NOTE — NURSING NOTE
[address] [address]  Dept: [phone]  ______________________________________________________________________________    Patient: Kay Ceja   : 1970   MRN: [id number]   2025    INVASIVE PROCEDURE SAFETY CHECKLIST    Date: 2025     Procedure:Right knee CSI Kenalog  Patient Name: Kay Ceja  MRN: [id number]  YOB: 1970    Action: Complete sections as appropriate. Any discrepancy results in a HARD COPY until resolved.     PRE PROCEDURE:  Patient ID verified with 2 identifiers (name and  or MRN): Yes  Procedure and site verified with patient/designee (when able): Yes  Accurate consent documentation in medical record: Yes  H&P (or appropriate assessment) documented in medical record: Yes  H&P must be up to 20 days prior to procedure and updates within 24 hours of procedure as applicable: NA  Relevant diagnostic and radiology test results appropriately labeled and displayed as applicable: Yes  Procedure site(s) marked with provider initials: NA    TIMEOUT:  Time-Out performed immediately prior to starting procedure, including verbal and active participation of all team members addressing the following:Yes  * Correct patient identify  * Confirmed that the correct side and site are marked  * An accurate procedure consent form  * Agreement on the procedure to be done  * Correct patient position  * Relevant images and results are properly labeled and appropriately displayed  * The need to administer antibiotics or fluids for irrigation purposes during the procedure as applicable   * Safety precautions based on patient history or medication use    DURING PROCEDURE: Verification of correct person, site, and procedures any time the responsibility for care of the patient is transferred to another member of the care team.       Prior to injection, verified patient identity using patient's name and date of birth.  Due to  injection administration, patient instructed to remain in clinic for 15 minutes  afterwards, and to report any adverse reaction to me immediately.    Joint injection was performed.      Drug Amount Wasted:  Yes: 1 mg/ml   Vial/Syringe: Single dose vial  Expiration Date:  1/2029      Savannah Barcenas ATC  July 7, 2025

## 2025-07-10 ENCOUNTER — THERAPY VISIT (OUTPATIENT)
Dept: PHYSICAL THERAPY | Facility: CLINIC | Age: 55
End: 2025-07-10
Attending: FAMILY MEDICINE
Payer: COMMERCIAL

## 2025-07-10 DIAGNOSIS — M25.561 CHRONIC PAIN OF RIGHT KNEE: Primary | ICD-10-CM

## 2025-07-10 DIAGNOSIS — M24.551 RIGHT HIP FLEXOR TIGHTNESS: ICD-10-CM

## 2025-07-10 DIAGNOSIS — G89.29 CHRONIC PAIN OF RIGHT KNEE: Primary | ICD-10-CM

## 2025-07-17 ENCOUNTER — THERAPY VISIT (OUTPATIENT)
Dept: PHYSICAL THERAPY | Facility: CLINIC | Age: 55
End: 2025-07-17
Payer: COMMERCIAL

## 2025-07-17 DIAGNOSIS — G89.29 CHRONIC PAIN OF RIGHT KNEE: Primary | ICD-10-CM

## 2025-07-17 DIAGNOSIS — M25.561 CHRONIC PAIN OF RIGHT KNEE: Primary | ICD-10-CM

## 2025-07-17 DIAGNOSIS — M24.551 RIGHT HIP FLEXOR TIGHTNESS: ICD-10-CM

## 2025-07-24 ENCOUNTER — THERAPY VISIT (OUTPATIENT)
Dept: PHYSICAL THERAPY | Facility: CLINIC | Age: 55
End: 2025-07-24
Payer: COMMERCIAL

## 2025-07-24 DIAGNOSIS — G89.29 CHRONIC PAIN OF RIGHT KNEE: Primary | ICD-10-CM

## 2025-07-24 DIAGNOSIS — M25.561 CHRONIC PAIN OF RIGHT KNEE: Primary | ICD-10-CM

## 2025-07-24 DIAGNOSIS — M24.551 RIGHT HIP FLEXOR TIGHTNESS: ICD-10-CM

## 2025-08-13 ENCOUNTER — PATIENT OUTREACH (OUTPATIENT)
Dept: PLASTIC SURGERY | Facility: CLINIC | Age: 55
End: 2025-08-13
Payer: COMMERCIAL

## 2025-08-20 ENCOUNTER — OFFICE VISIT (OUTPATIENT)
Dept: PLASTIC SURGERY | Facility: CLINIC | Age: 55
End: 2025-08-20
Payer: COMMERCIAL

## 2025-08-20 ENCOUNTER — PREP FOR PROCEDURE (OUTPATIENT)
Dept: PLASTIC SURGERY | Facility: CLINIC | Age: 55
End: 2025-08-20

## 2025-08-20 VITALS
HEIGHT: 64 IN | BODY MASS INDEX: 17.99 KG/M2 | DIASTOLIC BLOOD PRESSURE: 68 MMHG | WEIGHT: 105.4 LBS | OXYGEN SATURATION: 98 % | SYSTOLIC BLOOD PRESSURE: 103 MMHG | HEART RATE: 78 BPM

## 2025-08-20 DIAGNOSIS — H57.819 BROW PTOSIS: Primary | ICD-10-CM

## 2025-08-20 ASSESSMENT — PAIN SCALES - GENERAL: PAINLEVEL_OUTOF10: NO PAIN (0)

## 2025-08-25 ENCOUNTER — OFFICE VISIT (OUTPATIENT)
Dept: INTERNAL MEDICINE | Facility: CLINIC | Age: 55
End: 2025-08-25
Payer: COMMERCIAL

## 2025-08-25 VITALS
TEMPERATURE: 97.3 F | SYSTOLIC BLOOD PRESSURE: 110 MMHG | RESPIRATION RATE: 16 BRPM | OXYGEN SATURATION: 100 % | DIASTOLIC BLOOD PRESSURE: 74 MMHG | HEART RATE: 65 BPM | BODY MASS INDEX: 17.74 KG/M2 | WEIGHT: 103.9 LBS | HEIGHT: 64 IN

## 2025-08-25 DIAGNOSIS — L90.5 FACIAL SCAR: Primary | ICD-10-CM

## 2025-08-25 PROCEDURE — 3074F SYST BP LT 130 MM HG: CPT | Performed by: INTERNAL MEDICINE

## 2025-08-25 PROCEDURE — 3078F DIAST BP <80 MM HG: CPT | Performed by: INTERNAL MEDICINE

## 2025-08-25 PROCEDURE — 1126F AMNT PAIN NOTED NONE PRSNT: CPT | Performed by: INTERNAL MEDICINE

## 2025-08-25 PROCEDURE — 99214 OFFICE O/P EST MOD 30 MIN: CPT | Performed by: INTERNAL MEDICINE

## 2025-08-25 ASSESSMENT — PAIN SCALES - GENERAL: PAINLEVEL_OUTOF10: NO PAIN (0)

## 2025-08-28 ENCOUNTER — THERAPY VISIT (OUTPATIENT)
Dept: PHYSICAL THERAPY | Facility: CLINIC | Age: 55
End: 2025-08-28
Payer: COMMERCIAL

## 2025-08-28 DIAGNOSIS — G89.29 CHRONIC PAIN OF RIGHT KNEE: Primary | ICD-10-CM

## 2025-08-28 DIAGNOSIS — M25.561 CHRONIC PAIN OF RIGHT KNEE: Primary | ICD-10-CM

## 2025-08-28 DIAGNOSIS — M24.551 RIGHT HIP FLEXOR TIGHTNESS: ICD-10-CM

## (undated) DEVICE — SNARE CAPIVATOR ROUND COLD SNR BX10 M00561101

## (undated) DEVICE — SPECIMEN CONTAINER 3OZ W/FORMALIN 59901

## (undated) DEVICE — TUBING SUCTION 12"X1/4" N612

## (undated) DEVICE — GOWN IMPERVIOUS 2XL BLUE

## (undated) DEVICE — SUCTION MANIFOLD NEPTUNE 2 SYS 1 PORT 702-025-000

## (undated) DEVICE — SOL WATER IRRIG 500ML BOTTLE 2F7113

## (undated) DEVICE — KIT ENDO TURNOVER/PROCEDURE CARRY-ON 101822

## (undated) DEVICE — ENDO FORCEP SPIKED SERRATED SHAFT JUMBO 239CM G56998

## (undated) RX ORDER — LIDOCAINE HYDROCHLORIDE 10 MG/ML
INJECTION, SOLUTION EPIDURAL; INFILTRATION; INTRACAUDAL; PERINEURAL
Status: DISPENSED
Start: 2025-07-07

## (undated) RX ORDER — ONDANSETRON 2 MG/ML
INJECTION INTRAMUSCULAR; INTRAVENOUS
Status: DISPENSED
Start: 2021-10-12

## (undated) RX ORDER — TRIAMCINOLONE ACETONIDE 40 MG/ML
INJECTION, SUSPENSION INTRA-ARTICULAR; INTRAMUSCULAR
Status: DISPENSED
Start: 2025-07-07

## (undated) RX ORDER — FENTANYL CITRATE 50 UG/ML
INJECTION, SOLUTION INTRAMUSCULAR; INTRAVENOUS
Status: DISPENSED
Start: 2021-10-12

## (undated) RX ORDER — LIDOCAINE HYDROCHLORIDE 10 MG/ML
INJECTION, SOLUTION EPIDURAL; INFILTRATION; INTRACAUDAL; PERINEURAL
Status: DISPENSED
Start: 2021-10-12